# Patient Record
Sex: MALE | Race: WHITE | Employment: UNEMPLOYED | URBAN - METROPOLITAN AREA
[De-identification: names, ages, dates, MRNs, and addresses within clinical notes are randomized per-mention and may not be internally consistent; named-entity substitution may affect disease eponyms.]

---

## 2022-03-09 ENCOUNTER — APPOINTMENT (OUTPATIENT)
Dept: GENERAL RADIOLOGY | Age: 72
DRG: 389 | End: 2022-03-09
Payer: MEDICARE

## 2022-03-09 ENCOUNTER — HOSPITAL ENCOUNTER (INPATIENT)
Age: 72
LOS: 6 days | Discharge: NURSING, REHAB OR LONG-TERM CARE | DRG: 389 | End: 2022-03-15
Attending: EMERGENCY MEDICINE | Admitting: INTERNAL MEDICINE
Payer: MEDICARE

## 2022-03-09 ENCOUNTER — APPOINTMENT (OUTPATIENT)
Dept: CT IMAGING | Age: 72
DRG: 389 | End: 2022-03-09
Payer: MEDICARE

## 2022-03-09 DIAGNOSIS — K56.609 SBO (SMALL BOWEL OBSTRUCTION) (HCC): ICD-10-CM

## 2022-03-09 DIAGNOSIS — E87.6 HYPOKALEMIA: ICD-10-CM

## 2022-03-09 DIAGNOSIS — I48.91 ATRIAL FIBRILLATION WITH RVR (HCC): Primary | ICD-10-CM

## 2022-03-09 DIAGNOSIS — R19.7 NAUSEA VOMITING AND DIARRHEA: ICD-10-CM

## 2022-03-09 DIAGNOSIS — R11.2 NAUSEA VOMITING AND DIARRHEA: ICD-10-CM

## 2022-03-09 LAB
ALBUMIN SERPL-MCNC: 3.2 G/DL (ref 3.5–5.2)
ALP BLD-CCNC: 42 U/L (ref 40–129)
ALT SERPL-CCNC: 6 U/L (ref 0–40)
ANION GAP SERPL CALCULATED.3IONS-SCNC: 14 MMOL/L (ref 7–16)
APTT: 26.8 SEC (ref 24.5–35.1)
AST SERPL-CCNC: 10 U/L (ref 0–39)
BACTERIA: ABNORMAL /HPF
BASOPHILS ABSOLUTE: 0.02 E9/L (ref 0–0.2)
BASOPHILS ABSOLUTE: 0.03 E9/L (ref 0–0.2)
BASOPHILS RELATIVE PERCENT: 0.3 % (ref 0–2)
BASOPHILS RELATIVE PERCENT: 0.4 % (ref 0–2)
BILIRUB SERPL-MCNC: 0.3 MG/DL (ref 0–1.2)
BILIRUBIN URINE: NEGATIVE
BLOOD, URINE: ABNORMAL
BUN BLDV-MCNC: 22 MG/DL (ref 6–23)
CALCIUM SERPL-MCNC: 8.8 MG/DL (ref 8.6–10.2)
CHLORIDE BLD-SCNC: 107 MMOL/L (ref 98–107)
CLARITY: CLEAR
CO2: 16 MMOL/L (ref 22–29)
COLOR: YELLOW
CREAT SERPL-MCNC: 0.9 MG/DL (ref 0.7–1.2)
EKG ATRIAL RATE: 150 BPM
EKG Q-T INTERVAL: 300 MS
EKG QRS DURATION: 66 MS
EKG QTC CALCULATION (BAZETT): 485 MS
EKG R AXIS: 74 DEGREES
EKG T AXIS: -122 DEGREES
EKG VENTRICULAR RATE: 157 BPM
EOSINOPHILS ABSOLUTE: 0.16 E9/L (ref 0.05–0.5)
EOSINOPHILS ABSOLUTE: 0.18 E9/L (ref 0.05–0.5)
EOSINOPHILS RELATIVE PERCENT: 2 % (ref 0–6)
EOSINOPHILS RELATIVE PERCENT: 2.5 % (ref 0–6)
GFR AFRICAN AMERICAN: >60
GFR NON-AFRICAN AMERICAN: >60 ML/MIN/1.73
GLUCOSE BLD-MCNC: 141 MG/DL (ref 74–99)
GLUCOSE URINE: NEGATIVE MG/DL
HCT VFR BLD CALC: 34 % (ref 37–54)
HCT VFR BLD CALC: 36 % (ref 37–54)
HEMOGLOBIN: 11.6 G/DL (ref 12.5–16.5)
HEMOGLOBIN: 12.1 G/DL (ref 12.5–16.5)
IMMATURE GRANULOCYTES #: 0.03 E9/L
IMMATURE GRANULOCYTES #: 0.04 E9/L
IMMATURE GRANULOCYTES %: 0.4 % (ref 0–5)
IMMATURE GRANULOCYTES %: 0.5 % (ref 0–5)
KETONES, URINE: NEGATIVE MG/DL
LACTIC ACID: 1.7 MMOL/L (ref 0.5–2.2)
LEUKOCYTE ESTERASE, URINE: NEGATIVE
LIPASE: 228 U/L (ref 13–60)
LYMPHOCYTES ABSOLUTE: 1.51 E9/L (ref 1.5–4)
LYMPHOCYTES ABSOLUTE: 1.61 E9/L (ref 1.5–4)
LYMPHOCYTES RELATIVE PERCENT: 20.6 % (ref 20–42)
LYMPHOCYTES RELATIVE PERCENT: 20.9 % (ref 20–42)
MAGNESIUM: 2 MG/DL (ref 1.6–2.6)
MCH RBC QN AUTO: 33.6 PG (ref 26–35)
MCH RBC QN AUTO: 34.1 PG (ref 26–35)
MCHC RBC AUTO-ENTMCNC: 33.6 % (ref 32–34.5)
MCHC RBC AUTO-ENTMCNC: 34.1 % (ref 32–34.5)
MCV RBC AUTO: 100 FL (ref 80–99.9)
MCV RBC AUTO: 100 FL (ref 80–99.9)
MONOCYTES ABSOLUTE: 0.4 E9/L (ref 0.1–0.95)
MONOCYTES ABSOLUTE: 0.45 E9/L (ref 0.1–0.95)
MONOCYTES RELATIVE PERCENT: 5.5 % (ref 2–12)
MONOCYTES RELATIVE PERCENT: 5.7 % (ref 2–12)
NEUTROPHILS ABSOLUTE: 5.08 E9/L (ref 1.8–7.3)
NEUTROPHILS ABSOLUTE: 5.54 E9/L (ref 1.8–7.3)
NEUTROPHILS RELATIVE PERCENT: 70.4 % (ref 43–80)
NEUTROPHILS RELATIVE PERCENT: 70.8 % (ref 43–80)
NITRITE, URINE: POSITIVE
PDW BLD-RTO: 14.3 FL (ref 11.5–15)
PDW BLD-RTO: 14.4 FL (ref 11.5–15)
PH UA: 5.5 (ref 5–9)
PLATELET # BLD: 460 E9/L (ref 130–450)
PLATELET # BLD: 476 E9/L (ref 130–450)
PMV BLD AUTO: 9.1 FL (ref 7–12)
PMV BLD AUTO: 9.8 FL (ref 7–12)
POTASSIUM REFLEX MAGNESIUM: 3.3 MMOL/L (ref 3.5–5)
PROTEIN UA: NEGATIVE MG/DL
RBC # BLD: 3.4 E12/L (ref 3.8–5.8)
RBC # BLD: 3.6 E12/L (ref 3.8–5.8)
RBC UA: ABNORMAL /HPF (ref 0–2)
REASON FOR REJECTION: NORMAL
REASON FOR REJECTION: NORMAL
REJECTED TEST: NORMAL
REJECTED TEST: NORMAL
SARS-COV-2, NAAT: NOT DETECTED
SODIUM BLD-SCNC: 137 MMOL/L (ref 132–146)
SPECIFIC GRAVITY UA: <=1.005 (ref 1–1.03)
T4 TOTAL: 6.7 MCG/DL (ref 4.5–11.7)
TOTAL PROTEIN: 6.7 G/DL (ref 6.4–8.3)
TROPONIN, HIGH SENSITIVITY: 14 NG/L (ref 0–11)
TROPONIN, HIGH SENSITIVITY: 14 NG/L (ref 0–11)
TSH SERPL DL<=0.05 MIU/L-ACNC: 9.9 UIU/ML (ref 0.27–4.2)
UROBILINOGEN, URINE: 0.2 E.U./DL
WBC # BLD: 7.2 E9/L (ref 4.5–11.5)
WBC # BLD: 7.8 E9/L (ref 4.5–11.5)
WBC UA: ABNORMAL /HPF (ref 0–5)

## 2022-03-09 PROCEDURE — 93005 ELECTROCARDIOGRAM TRACING: CPT | Performed by: EMERGENCY MEDICINE

## 2022-03-09 PROCEDURE — 2500000003 HC RX 250 WO HCPCS: Performed by: EMERGENCY MEDICINE

## 2022-03-09 PROCEDURE — 36415 COLL VENOUS BLD VENIPUNCTURE: CPT

## 2022-03-09 PROCEDURE — 85025 COMPLETE CBC W/AUTO DIFF WBC: CPT

## 2022-03-09 PROCEDURE — 81001 URINALYSIS AUTO W/SCOPE: CPT

## 2022-03-09 PROCEDURE — 2580000003 HC RX 258: Performed by: RADIOLOGY

## 2022-03-09 PROCEDURE — 83605 ASSAY OF LACTIC ACID: CPT

## 2022-03-09 PROCEDURE — 85730 THROMBOPLASTIN TIME PARTIAL: CPT

## 2022-03-09 PROCEDURE — 6360000002 HC RX W HCPCS: Performed by: STUDENT IN AN ORGANIZED HEALTH CARE EDUCATION/TRAINING PROGRAM

## 2022-03-09 PROCEDURE — 96368 THER/DIAG CONCURRENT INF: CPT

## 2022-03-09 PROCEDURE — 6370000000 HC RX 637 (ALT 250 FOR IP): Performed by: STUDENT IN AN ORGANIZED HEALTH CARE EDUCATION/TRAINING PROGRAM

## 2022-03-09 PROCEDURE — 96366 THER/PROPH/DIAG IV INF ADDON: CPT

## 2022-03-09 PROCEDURE — 84484 ASSAY OF TROPONIN QUANT: CPT

## 2022-03-09 PROCEDURE — 2060000000 HC ICU INTERMEDIATE R&B

## 2022-03-09 PROCEDURE — 83690 ASSAY OF LIPASE: CPT

## 2022-03-09 PROCEDURE — 71045 X-RAY EXAM CHEST 1 VIEW: CPT

## 2022-03-09 PROCEDURE — 74177 CT ABD & PELVIS W/CONTRAST: CPT

## 2022-03-09 PROCEDURE — 87635 SARS-COV-2 COVID-19 AMP PRB: CPT

## 2022-03-09 PROCEDURE — 96375 TX/PRO/DX INJ NEW DRUG ADDON: CPT

## 2022-03-09 PROCEDURE — 6360000004 HC RX CONTRAST MEDICATION: Performed by: RADIOLOGY

## 2022-03-09 PROCEDURE — 96376 TX/PRO/DX INJ SAME DRUG ADON: CPT

## 2022-03-09 PROCEDURE — 80053 COMPREHEN METABOLIC PANEL: CPT

## 2022-03-09 PROCEDURE — 84436 ASSAY OF TOTAL THYROXINE: CPT

## 2022-03-09 PROCEDURE — 2580000003 HC RX 258: Performed by: STUDENT IN AN ORGANIZED HEALTH CARE EDUCATION/TRAINING PROGRAM

## 2022-03-09 PROCEDURE — 84443 ASSAY THYROID STIM HORMONE: CPT

## 2022-03-09 PROCEDURE — 99285 EMERGENCY DEPT VISIT HI MDM: CPT

## 2022-03-09 PROCEDURE — 2580000003 HC RX 258: Performed by: EMERGENCY MEDICINE

## 2022-03-09 PROCEDURE — 96374 THER/PROPH/DIAG INJ IV PUSH: CPT

## 2022-03-09 PROCEDURE — 96365 THER/PROPH/DIAG IV INF INIT: CPT

## 2022-03-09 PROCEDURE — 83735 ASSAY OF MAGNESIUM: CPT

## 2022-03-09 RX ORDER — FENTANYL CITRATE 50 UG/ML
25 INJECTION, SOLUTION INTRAMUSCULAR; INTRAVENOUS ONCE
Status: COMPLETED | OUTPATIENT
Start: 2022-03-09 | End: 2022-03-09

## 2022-03-09 RX ORDER — 0.9 % SODIUM CHLORIDE 0.9 %
1000 INTRAVENOUS SOLUTION INTRAVENOUS ONCE
Status: COMPLETED | OUTPATIENT
Start: 2022-03-09 | End: 2022-03-09

## 2022-03-09 RX ORDER — POTASSIUM CHLORIDE 20 MEQ/1
40 TABLET, EXTENDED RELEASE ORAL ONCE
Status: DISCONTINUED | OUTPATIENT
Start: 2022-03-09 | End: 2022-03-09

## 2022-03-09 RX ORDER — HEPARIN SODIUM 10000 [USP'U]/100ML
5-30 INJECTION, SOLUTION INTRAVENOUS CONTINUOUS
Status: DISCONTINUED | OUTPATIENT
Start: 2022-03-09 | End: 2022-03-12

## 2022-03-09 RX ORDER — LORATADINE 10 MG/1
10 CAPSULE, LIQUID FILLED ORAL DAILY
Status: ON HOLD | COMMUNITY
End: 2022-03-14

## 2022-03-09 RX ORDER — OXYMETAZOLINE HYDROCHLORIDE 0.05 G/100ML
2 SPRAY NASAL ONCE
Status: COMPLETED | OUTPATIENT
Start: 2022-03-09 | End: 2022-03-09

## 2022-03-09 RX ORDER — MEGESTROL ACETATE 40 MG/ML
800 SUSPENSION ORAL DAILY
COMMUNITY

## 2022-03-09 RX ORDER — PANTOPRAZOLE SODIUM 40 MG/1
40 TABLET, DELAYED RELEASE ORAL DAILY
COMMUNITY

## 2022-03-09 RX ORDER — ASPIRIN 81 MG/1
81 TABLET, CHEWABLE ORAL DAILY
Status: ON HOLD | COMMUNITY
End: 2022-03-14

## 2022-03-09 RX ORDER — HEPARIN SODIUM 1000 [USP'U]/ML
30 INJECTION, SOLUTION INTRAVENOUS; SUBCUTANEOUS PRN
Status: DISCONTINUED | OUTPATIENT
Start: 2022-03-09 | End: 2022-03-12

## 2022-03-09 RX ORDER — SODIUM CHLORIDE 0.9 % (FLUSH) 0.9 %
10 SYRINGE (ML) INJECTION PRN
Status: COMPLETED | OUTPATIENT
Start: 2022-03-09 | End: 2022-03-10

## 2022-03-09 RX ORDER — PROPRANOLOL HYDROCHLORIDE 120 MG/1
120 CAPSULE, EXTENDED RELEASE ORAL DAILY
Status: ON HOLD | COMMUNITY
End: 2022-03-15 | Stop reason: HOSPADM

## 2022-03-09 RX ORDER — HEPARIN SODIUM 1000 [USP'U]/ML
60 INJECTION, SOLUTION INTRAVENOUS; SUBCUTANEOUS PRN
Status: DISCONTINUED | OUTPATIENT
Start: 2022-03-09 | End: 2022-03-12

## 2022-03-09 RX ORDER — PRIMIDONE 50 MG/1
50 TABLET ORAL EVERY MORNING
COMMUNITY

## 2022-03-09 RX ORDER — DOCUSATE SODIUM 100 MG/1
100 CAPSULE, LIQUID FILLED ORAL DAILY
COMMUNITY

## 2022-03-09 RX ORDER — POTASSIUM CHLORIDE 20 MEQ/1
20 TABLET, EXTENDED RELEASE ORAL DAILY
Status: ON HOLD | COMMUNITY
End: 2022-03-14

## 2022-03-09 RX ORDER — HEPARIN SODIUM 1000 [USP'U]/ML
60 INJECTION, SOLUTION INTRAVENOUS; SUBCUTANEOUS ONCE
Status: COMPLETED | OUTPATIENT
Start: 2022-03-09 | End: 2022-03-09

## 2022-03-09 RX ORDER — POTASSIUM CHLORIDE 7.45 MG/ML
10 INJECTION INTRAVENOUS
Status: COMPLETED | OUTPATIENT
Start: 2022-03-09 | End: 2022-03-09

## 2022-03-09 RX ORDER — DILTIAZEM HYDROCHLORIDE 5 MG/ML
10 INJECTION INTRAVENOUS ONCE
Status: COMPLETED | OUTPATIENT
Start: 2022-03-09 | End: 2022-03-09

## 2022-03-09 RX ORDER — DILTIAZEM HYDROCHLORIDE 100 MG/1
INJECTION, POWDER, LYOPHILIZED, FOR SOLUTION INTRAVENOUS
Status: DISPENSED
Start: 2022-03-09 | End: 2022-03-10

## 2022-03-09 RX ORDER — TOPIRAMATE 25 MG/1
25 TABLET ORAL 2 TIMES DAILY
COMMUNITY

## 2022-03-09 RX ORDER — LIDOCAINE HYDROCHLORIDE 20 MG/ML
JELLY TOPICAL ONCE
Status: COMPLETED | OUTPATIENT
Start: 2022-03-09 | End: 2022-03-09

## 2022-03-09 RX ORDER — SODIUM CHLORIDE 9 MG/ML
INJECTION, SOLUTION INTRAVENOUS CONTINUOUS
Status: ACTIVE | OUTPATIENT
Start: 2022-03-09 | End: 2022-03-10

## 2022-03-09 RX ORDER — 0.9 % SODIUM CHLORIDE 0.9 %
250 INTRAVENOUS SOLUTION INTRAVENOUS ONCE
Status: DISCONTINUED | OUTPATIENT
Start: 2022-03-09 | End: 2022-03-15

## 2022-03-09 RX ADMIN — DILTIAZEM HYDROCHLORIDE 10 MG: 5 INJECTION INTRAVENOUS at 13:00

## 2022-03-09 RX ADMIN — POTASSIUM CHLORIDE 10 MEQ: 10 INJECTION, SOLUTION INTRAVENOUS at 17:59

## 2022-03-09 RX ADMIN — SODIUM CHLORIDE, PRESERVATIVE FREE 10 ML: 5 INJECTION INTRAVENOUS at 14:24

## 2022-03-09 RX ADMIN — Medication 2 SPRAY: at 16:19

## 2022-03-09 RX ADMIN — HEPARIN SODIUM 12 UNITS/KG/HR: 10000 INJECTION, SOLUTION INTRAVENOUS at 18:00

## 2022-03-09 RX ADMIN — FENTANYL CITRATE 25 MCG: 50 INJECTION, SOLUTION INTRAMUSCULAR; INTRAVENOUS at 15:18

## 2022-03-09 RX ADMIN — FENTANYL CITRATE 25 MCG: 50 INJECTION INTRAMUSCULAR; INTRAVENOUS at 18:35

## 2022-03-09 RX ADMIN — SODIUM CHLORIDE 1000 ML: 9 INJECTION, SOLUTION INTRAVENOUS at 12:59

## 2022-03-09 RX ADMIN — LIDOCAINE HYDROCHLORIDE: 20 JELLY TOPICAL at 16:19

## 2022-03-09 RX ADMIN — HEPARIN SODIUM 2720 UNITS: 1000 INJECTION INTRAVENOUS; SUBCUTANEOUS at 18:01

## 2022-03-09 RX ADMIN — SODIUM CHLORIDE: 9 INJECTION, SOLUTION INTRAVENOUS at 18:13

## 2022-03-09 RX ADMIN — IOPAMIDOL 75 ML: 755 INJECTION, SOLUTION INTRAVENOUS at 14:27

## 2022-03-09 RX ADMIN — POTASSIUM CHLORIDE 10 MEQ: 10 INJECTION, SOLUTION INTRAVENOUS at 19:12

## 2022-03-09 RX ADMIN — POTASSIUM CHLORIDE 10 MEQ: 10 INJECTION, SOLUTION INTRAVENOUS at 20:13

## 2022-03-09 RX ADMIN — POTASSIUM CHLORIDE 10 MEQ: 10 INJECTION, SOLUTION INTRAVENOUS at 21:37

## 2022-03-09 RX ADMIN — DEXTROSE MONOHYDRATE 2.5 MG/HR: 50 INJECTION, SOLUTION INTRAVENOUS at 13:08

## 2022-03-09 ASSESSMENT — PAIN DESCRIPTION - LOCATION
LOCATION: THROAT
LOCATION: THROAT

## 2022-03-09 ASSESSMENT — PAIN SCALES - GENERAL
PAINLEVEL_OUTOF10: 2
PAINLEVEL_OUTOF10: 9
PAINLEVEL_OUTOF10: 2

## 2022-03-09 ASSESSMENT — PAIN DESCRIPTION - PAIN TYPE
TYPE: ACUTE PAIN
TYPE: ACUTE PAIN

## 2022-03-09 NOTE — ED NOTES
Meds list not complete. Discrepancies found in meds list sent from [facility].       Jose C Reese RN  03/09/22 0603

## 2022-03-09 NOTE — LETTER
[place] [employer] Medicaid  CERTIFICATION OF NECESSITY  FOR NON-EMERGENCY TRANSPORTATION   BY GROUND AMBULANCE      Individual Information   1. Name: Chiki Calvo 2. [place] Medicaid Billing Number:    3. Address: [address] [place] [address]      Transportation Provider Information   4. Provider Name:    5. [place] Provider Number:  National Provider Identifier (NPI):      Certification  7. Criteria:  During transport, this individual requires:  [x] Medical treatment or continuous     supervision by an EMT. [] The administration or regulation of oxygen by another person. [] Supervised protective restraint. 8. Period Beginning Date:  3/    /2022   9. Length  [x] Not more than 10 day(s)  [x] One Year     Additional Information Relevant to Certification   10. Comments or Explanations, If Necessary or Appropriate     Poor appetite and fluid intake, A-Fib RVR; h/o cognitive impairment (non-specific), pt is A&Ox1 person only     Certifying Practitioner Information   11. Name of Practitioner: Latricia Chavez MD   12. [place] Medicaid Provider Number, If Applicable:  [address] Provider Identifier (NPI):      Signature Information   14. Date of Signature: 3/11/2022 15. Name of Person Signing: Electronically signed by Loi Pierce RN on 3/11/2022 at 10:17 AM     16. Signature and Professional Designation: Electronically signed by Loi Pierce RN on 3/11/2022 at 10:17 AM  [email or web address]     ODM 90032  Rev. 7/2015    ODM 70797  Rev. 7/2015    [address] [facility] Encounter Date/Time: 3/9/2022 [address] Account: [de-identified]    MRN: [id number]    Patient: Chiki Calvo    Contact Serial #: [id number]      ENCOUNTER          Patient Class: I Private Enc?   No Unit [facility] [id number]-M   Hospital Service: MED   Encounter DX: Hypokalemia [E87.6]   ADM Provider: Latricia Chavez MD   Procedure:     ATT Provider: Latricia Chavez MD   REF Provider:

## 2022-03-09 NOTE — ED PROVIDER NOTES
HPI:  3/9/22, Time: 12:24 PM JESSY Mercado is a 73 y.o. male presenting to the ED for abdominal pain, nausea, vomiting, diarrhea, beginning a few days ago. The complaint has been persistent, moderate in severity, and worsened by nothing. Patient lives in an assisted living facility. He went to visit his niece over the weekend and he had vomiting and diarrhea during his visit. He has persisted to have vomiting and diarrhea over the last couple of days. He is not eating or drinking very much per the nurses at the assisted living facility. Patient is complaining of lower abdominal pain. Aching sensation. No radiation. No alleviating or exacerbating factors. He denies any fever, rhinorrhea, sore throat, cough, chest pain, shortness of breath, dysuria. ROS:   Pertinent positives and negatives are stated within HPI, all other systems reviewed and are negative.  --------------------------------------------- PAST HISTORY ---------------------------------------------  Past Medical History:  has a past medical history of A-fib ([facility] [address] Cognitive impairment, Cystoma, [facility] Hyperlipidemia, and Hypothyroid. Past Surgical History:  has no past surgical history on file. Social History:  reports that he has never smoked. He has never used smokeless tobacco. He reports previous alcohol use. He reports that he does not use drugs. Family History: family history is not on file. The patients home medications have been reviewed. Allergies: Patient has no known allergies.     ---------------------------------------------------PHYSICAL EXAM--------------------------------------    Constitutional/General: Alert and oriented x3, well appearing, non toxic in NAD  Head: Normocephalic and atraumatic  Eyes: PERRL, EOMI  Mouth: Oropharynx clear, handling secretions, no trismus  Neck: Supple, full ROM, non tender to palpation in the midline, no stridor, no crepitus, no meningeal E9/L    Basophils Absolute 0.02 0.00 - 0.20 E9/L   Comprehensive Metabolic Panel w/ Reflex to MG   Result Value Ref Range    Sodium 137 132 - 146 mmol/L    Potassium reflex Magnesium 3.3 (L) 3.5 - 5.0 mmol/L    Chloride 107 98 - 107 mmol/L    CO2 16 (L) 22 - 29 mmol/L    Anion Gap 14 7 - 16 mmol/L    Glucose 141 (H) 74 - 99 mg/dL    BUN 22 6 - 23 mg/dL    CREATININE 0.9 0.7 - 1.2 mg/dL    GFR Non-African American >60 >=60 mL/min/1.73    GFR African American >60     Calcium 8.8 8.6 - 10.2 mg/dL    Total Protein 6.7 6.4 - 8.3 g/dL    Albumin 3.2 (L) 3.5 - 5.2 g/dL    Total Bilirubin 0.3 0.0 - 1.2 mg/dL    Alkaline Phosphatase 42 40 - 129 U/L    ALT 6 0 - 40 U/L    AST 10 0 - 39 U/L   Troponin   Result Value Ref Range    Troponin, High Sensitivity 14 (H) 0 - 11 ng/L   Urinalysis with Microscopic   Result Value Ref Range    Color, UA Yellow Straw/Yellow    Clarity, UA Clear Clear    Glucose, Ur Negative Negative mg/dL    Bilirubin Urine Negative Negative    Ketones, Urine Negative Negative mg/dL    Specific Gravity, UA <=1.005 1.005 - 1.030    Blood, Urine MODERATE (A) Negative    pH, UA 5.5 5.0 - 9.0    Protein, UA Negative Negative mg/dL    Urobilinogen, Urine 0.2 <2.0 E.U./dL    Nitrite, Urine POSITIVE (A) Negative    Leukocyte Esterase, Urine Negative Negative    WBC, UA 0-1 0 - 5 /HPF    RBC, UA 2-5 0 - 2 /HPF    Bacteria, UA MODERATE (A) None Seen /HPF   Lactic Acid   Result Value Ref Range    Lactic Acid 1.7 0.5 - 2.2 mmol/L   Lipase   Result Value Ref Range    Lipase 228 (H) 13 - 60 U/L   Magnesium   Result Value Ref Range    Magnesium 2.0 1.6 - 2.6 mg/dL   Troponin   Result Value Ref Range    Troponin, High Sensitivity 14 (H) 0 - 11 ng/L   EKG 12 Lead   Result Value Ref Range    Ventricular Rate 157 BPM    Atrial Rate 150 BPM    QRS Duration 66 ms    Q-T Interval 300 ms    QTc Calculation (Bazett) 485 ms    R Axis 74 degrees    T Axis -122 degrees       RADIOLOGY:  Interpreted by Radiologist.  XR CHEST PORTABLE sodium chloride bolus (has no administration in time range)   potassium chloride 10 mEq/100 mL IVPB (Peripheral Line) (has no administration in time range)   heparin (porcine) injection 2,720 Units (has no administration in time range)   heparin (porcine) injection 2,720 Units (has no administration in time range)   heparin (porcine) injection 1,360 Units (has no administration in time range)   heparin 25,000 units in dextrose 5% 250 mL (premix) infusion (has no administration in time range)   0.9 % sodium chloride bolus (0 mLs IntraVENous Stopped 3/9/22 1526)   iopamidol (ISOVUE-370) 76 % injection 75 mL (75 mLs IntraVENous Given 3/9/22 1427)   sodium chloride flush 0.9 % injection 10 mL (10 mLs IntraVENous Given 3/9/22 1424)   fentaNYL (SUBLIMAZE) injection 25 mcg (25 mcg IntraVENous Given 3/9/22 1518)   oxymetazoline (AFRIN) 0.05 % nasal spray 2 spray (2 sprays Each Nostril Given by Other 3/9/22 1619)   lidocaine (XYLOCAINE) 2 % jelly ( Topical Given by Other 3/9/22 1619)             Medical Decision Making:    Patient's vitals remarkable for tachycardia. Heart rate in the 170s on arrival.  Patient's niece is at bedside. She states that he does have a history of atrial fibrillation. She is not sure if he is on any medications at this time. I spoke with the . Patient is not on any anticoagulants or any medications for atrial fibrillation. Patient started on cardizem drip. Rectal exam done. Heme occult negative stool. Labs obtained. Hemoglobin stable at 12.1. Potassium of 3.3. Troponin of 14. Lactic acid normal. Lipase elevated. CT abd/pelvis pending. Troponin 14, repeat 14, EKG shows A. fib RVR. Covid testing negative. White count 7.2, hemoglobin stable at 12.1. Mild hypokalemia with potassium 3.3 which was repleted. Lipase mildly elevated to 28. CT of the abdomen was obtained as he does endorse generalized abdominal pain, nausea and vomiting.   CT imaging shows a hiatal hernia which family is aware of, T10 and T11 compression fractures which appear to be chronic, he has no pain in this area on exam.  CT imaging also notes a small bowel obstruction. Patient was given pain medication. He was made NPO. Patient will be transferred to [facility] for evaluation of small bowel obstruction with associated atrial fibrillation with RVR. General surgery consulted who requested NG tube to be placed. Cardiology was requested and recommends small IV fluid bolus and does request low-dose heparin protocol as patient is not chronically anticoagulated, has elevated [id number] score. Surgery is okay with heparin. Patient has no contraindications for anticoagulation. Re-Evaluations:             Re-evaluation. Patients symptoms show no change      Consultations:    Spoke with Dr. Gurwinder Pond who will admit     Dr. Nancie Rich, surgery who will provide consult.     Dr. Alfredito Wilburn, cardiology who will provide consult.     This patient has remained hemodynamically stable during their ED course. Critical Care: 35          This patient's ED course included: a personal history and physicial examination, re-evaluation prior to disposition, multiple bedside re-evaluations, IV medications, cardiac monitoring, continuous pulse oximetry, complex medical decision making and emergency management and a personal history and physicial eaxmination    This patient has remained hemodynamically stable during their ED course. Counseling: The emergency provider has spoken with the patient and family member patient and guardian and discussed todays results, in addition to providing specific details for the plan of care and counseling regarding the diagnosis and prognosis. Questions are answered at this time and they are agreeable with the plan.       --------------------------------- IMPRESSION AND DISPOSITION ---------------------------------    IMPRESSION  1.  Atrial fibrillation with RVR ([facility] [address]    2. Nausea vomiting and diarrhea    3. SBO (small bowel obstruction) ([facility] [address]    4.  Hypokalemia        DISPOSITION  Disposition: Admit to telemetry at [facility]  Patient condition is good         Amilcar Rojas DO  Resident  03/09/22 [address] MD  03/10/22 6313

## 2022-03-09 NOTE — ED NOTES
Dr request to speak to general surgeon again. Call placed and [facility] calling the surgeon now.      Deborah Maya RN  03/09/22 2976

## 2022-03-09 NOTE — PROGRESS NOTES
Chief Complaint   Patient presents with    Tachycardia     PT was tachy rate of 160's from SNF, sent d/t poor appetite and fluid intake. 3/9/22  3:01 PM EST      Patient signed out to me by ongoing physician pending CT imaging. Current disposition is for transfer to [facility] for A. fib RVR. Patient presents from assisted living facility for nausea vomiting generalized abdominal pain. Noted be tachycardic on arrival, EKG shows atrial fibrillation with RVR, heart rate in the 160s. Patient given Cardizem bolus and placed on drip. Family states he did have a history of A. fib 10 years ago, has not been on any medication since and has not had any complications since that time. Is not on any blood thinning medications. Daughter states that he was at her home over the weekend, started to develop nausea vomiting and loose stools as of Sunday with associated generalized abdominal pain. Has a history of previous cholecystectomy. Troponin 14, repeat 14, EKG shows A. fib RVR. Covid testing negative. White count 7.2, hemoglobin stable at 12.1. Mild hypokalemia with potassium 3.3 which was repleted. Lipase mildly elevated to 28. CT of the abdomen was obtained as he does endorse generalized abdominal pain, nausea and vomiting. CT imaging shows a hiatal hernia which family is aware of, T10 and T11 compression fractures which appear to be chronic, he has no pain in this area on exam.  CT imaging also notes a small bowel obstruction. Patient was given pain medication. He was made NPO. Patient will be transferred to [facility] for evaluation of small bowel obstruction with associated atrial fibrillation with RVR. General surgery consulted who requested NG tube to be placed. Cardiology was requested and recommends small IV fluid bolus and does request low-dose heparin protocol as patient is not chronically anticoagulated, has elevated [id number] score.   Surgery is okay with heparin. Patient has no contraindications for anticoagulation. ED Course as of 03/09/22 1652   Wed Mar 09, 2022   1531 Spoke with general surgery, Dr. Tobin Hdez who will provide consult for small bowel obstruction. He does request replace NG tube in the department []   1600 Dr. Rell Lou will accept as transfer to [facility] []   2804 Spoke with Dr Herb Ray, cardiology and discussed case. He will provide consult []      ED Course User Index  [] Nalini Echols, DO       --------------------------------------------- PAST HISTORY ---------------------------------------------  Past Medical History:  has a past medical history of A-fib ([facility] [address] Cognitive impairment, Cystoma, Heart failure ([facility] [address] Hyperlipidemia, and Hypothyroid. Past Surgical History:  has no past surgical history on file. Social History:  reports that he has never smoked. He has never used smokeless tobacco. He reports previous alcohol use. He reports that he does not use drugs. Family History: family history is not on file. The patients home medications have been reviewed. Allergies: Patient has no known allergies.     -------------------------------------------------- RESULTS -------------------------------------------------    LABS:  Results for orders placed or performed during the hospital encounter of 03/09/22   COVID-19, Rapid    Specimen: Nasopharyngeal Swab   Result Value Ref Range    SARS-CoV-2, NAAT Not Detected Not Detected   CBC with Auto Differential   Result Value Ref Range    WBC 7.2 4.5 - 11.5 E9/L    RBC 3.60 (L) 3.80 - 5.80 E12/L    Hemoglobin 12.1 (L) 12.5 - 16.5 g/dL    Hematocrit 36.0 (L) 37.0 - 54.0 %    .0 (H) 80.0 - 99.9 fL    MCH 33.6 26.0 - 35.0 pg    MCHC 33.6 32.0 - 34.5 %    RDW 14.3 11.5 - 15.0 fL    Platelets 328 (H) 710 - 450 E9/L    MPV 9.1 7.0 - 12.0 fL    Neutrophils % 70.4 43.0 - 80.0 %    Immature Granulocytes % 0.4 0.0 - 5.0 %    Lymphocytes % 20.9 20.0 - 42.0 %    Monocytes % 5.5 2.0 - 12.0 %    Eosinophils % 2.5 0.0 - 6.0 %    Basophils % 0.3 0.0 - 2.0 %    Neutrophils Absolute 5.08 1.80 - 7.30 E9/L    Immature Granulocytes # 0.03 E9/L    Lymphocytes Absolute 1.51 1.50 - 4.00 E9/L    Monocytes Absolute 0.40 0.10 - 0.95 E9/L    Eosinophils Absolute 0.18 0.05 - 0.50 E9/L    Basophils Absolute 0.02 0.00 - 0.20 E9/L   Comprehensive Metabolic Panel w/ Reflex to MG   Result Value Ref Range    Sodium 137 132 - 146 mmol/L    Potassium reflex Magnesium 3.3 (L) 3.5 - 5.0 mmol/L    Chloride 107 98 - 107 mmol/L    CO2 16 (L) 22 - 29 mmol/L    Anion Gap 14 7 - 16 mmol/L    Glucose 141 (H) 74 - 99 mg/dL    BUN 22 6 - 23 mg/dL    CREATININE 0.9 0.7 - 1.2 mg/dL    GFR Non-African American >60 >=60 mL/min/1.73    GFR African American >60     Calcium 8.8 8.6 - 10.2 mg/dL    Total Protein 6.7 6.4 - 8.3 g/dL    Albumin 3.2 (L) 3.5 - 5.2 g/dL    Total Bilirubin 0.3 0.0 - 1.2 mg/dL    Alkaline Phosphatase 42 40 - 129 U/L    ALT 6 0 - 40 U/L    AST 10 0 - 39 U/L   Troponin   Result Value Ref Range    Troponin, High Sensitivity 14 (H) 0 - 11 ng/L   Urinalysis with Microscopic   Result Value Ref Range    Color, UA Yellow Straw/Yellow    Clarity, UA Clear Clear    Glucose, Ur Negative Negative mg/dL    Bilirubin Urine Negative Negative    Ketones, Urine Negative Negative mg/dL    Specific Gravity, UA <=1.005 1.005 - 1.030    Blood, Urine MODERATE (A) Negative    pH, UA 5.5 5.0 - 9.0    Protein, UA Negative Negative mg/dL    Urobilinogen, Urine 0.2 <2.0 E.U./dL    Nitrite, Urine POSITIVE (A) Negative    Leukocyte Esterase, Urine Negative Negative    WBC, UA 0-1 0 - 5 /HPF    RBC, UA 2-5 0 - 2 /HPF    Bacteria, UA MODERATE (A) None Seen /HPF   Lactic Acid   Result Value Ref Range    Lactic Acid 1.7 0.5 - 2.2 mmol/L   Lipase   Result Value Ref Range    Lipase 228 (H) 13 - 60 U/L   Magnesium   Result Value Ref Range    Magnesium 2.0 1.6 - 2.6 mg/dL   Troponin   Result Value Ref Range    Troponin, High Sensitivity 14 (H) 0 - 11 ng/L   EKG 12 Lead   Result Value Ref Range    Ventricular Rate 157 BPM    Atrial Rate 150 BPM    QRS Duration 66 ms    Q-T Interval 300 ms    QTc Calculation (Bazett) 485 ms    R Axis 74 degrees    T Axis -122 degrees       RADIOLOGY:  XR CHEST PORTABLE   Final Result   1. Satisfactory position of the NG tube within the stomach         CT ABDOMEN PELVIS W IV CONTRAST Additional Contrast? None   Final Result   Small bowel obstruction although the exact etiology and point of obstruction   is not clearly delineated on this examination. T10 and T11 compression fractures which are probably chronic. Mild diffuse urinary bladder wall thickening may be due to chronic outlet   obstruction. Small left inguinal fat and fluid containing hernia. Large hiatal hernia. Diverticulosis but no evidence of diverticulitis. XR CHEST PORTABLE   Final Result   No radiographic evidence of acute cardiopulmonary disease. Small hiatal hernia.                 ------------------------- NURSING NOTES AND VITALS REVIEWED ---------------------------  Date / Time Roomed:  3/9/2022 12:10 PM  ED Bed Assignment:  04/04    The nursing notes within the ED encounter and vital signs as below have been reviewed.      Patient Vitals for the past 24 hrs:   BP Temp Pulse Resp SpO2 Weight   03/09/22 1640 115/67 -- 93 18 99 % --   03/09/22 1439 117/61 -- 124 19 99 % --   03/09/22 1332 -- -- -- -- -- 100 lb (45.4 kg)   03/09/22 1328 119/70 -- 120 18 97 % --   03/09/22 1308 102/63 -- 121 -- -- --   03/09/22 1303 92/70 -- 104 -- -- --   03/09/22 1230 112/72 -- 160 -- -- --   03/09/22 1227 103/81 98.5 °F (36.9 °C) 86 16 100 % --       Oxygen Saturation Interpretation: Normal    ------------------------------------------ PROGRESS NOTES ------------------------------------------    Counseling:  I have spoken with the patient and discussed todays results, in addition to providing specific details for the plan of care and counseling regarding the diagnosis and prognosis. Their questions are answered at this time and they are agreeable with the plan of admission.    --------------------------------- ADDITIONAL PROVIDER NOTES ---------------------------------  Consultations:    Spoke with Dr. Nancy Marroquin who will admit    Dr. Mirtha Patel, surgery who will provide consult. Dr. Bailee Miramontes, cardiology who will provide consult. This patient has remained hemodynamically stable during their ED course. Medications   dilTIAZem injection 10 mg (10 mg IntraVENous Given 3/9/22 1300)     Followed by   dilTIAZem 100 mg in dextrose 5 % 100 mL infusion (ADD-Orwell) (10 mg/hr IntraVENous Rate/Dose Change 3/9/22 1509)   dilTIAZem 100 MG injection (  Not Given 3/9/22 1311)   0.9 % sodium chloride infusion (has no administration in time range)   0.9 % sodium chloride bolus (has no administration in time range)   potassium chloride 10 mEq/100 mL IVPB (Peripheral Line) (has no administration in time range)   heparin (porcine) injection 2,720 Units (has no administration in time range)   heparin (porcine) injection 2,720 Units (has no administration in time range)   heparin (porcine) injection 1,360 Units (has no administration in time range)   heparin 25,000 units in dextrose 5% 250 mL (premix) infusion (has no administration in time range)   0.9 % sodium chloride bolus (0 mLs IntraVENous Stopped 3/9/22 1526)   iopamidol (ISOVUE-370) 76 % injection 75 mL (75 mLs IntraVENous Given 3/9/22 1427)   sodium chloride flush 0.9 % injection 10 mL (10 mLs IntraVENous Given 3/9/22 1424)   fentaNYL (SUBLIMAZE) injection 25 mcg (25 mcg IntraVENous Given 3/9/22 1518)   oxymetazoline (AFRIN) 0.05 % nasal spray 2 spray (2 sprays Each Nostril Given by Other 3/9/22 1619)   lidocaine (XYLOCAINE) 2 % jelly ( Topical Given by Other 3/9/22 1619)         Diagnosis:  1. Atrial fibrillation with RVR ([address]    2. Nausea vomiting and diarrhea    3.  SBO (small bowel obstruction) [facility])        Disposition:  Patient's disposition: Transfer to [facility]   Patient's condition is stable.

## 2022-03-10 ENCOUNTER — APPOINTMENT (OUTPATIENT)
Dept: GENERAL RADIOLOGY | Age: 72
DRG: 389 | End: 2022-03-10
Payer: MEDICARE

## 2022-03-10 ENCOUNTER — APPOINTMENT (OUTPATIENT)
Dept: CT IMAGING | Age: 72
DRG: 389 | End: 2022-03-10
Payer: MEDICARE

## 2022-03-10 PROBLEM — K56.609 SMALL BOWEL OBSTRUCTION (HCC): Status: ACTIVE | Noted: 2022-03-10

## 2022-03-10 LAB
APTT: 25.7 SEC (ref 24.5–35.1)
APTT: 26.2 SEC (ref 24.5–35.1)
APTT: 28.7 SEC (ref 24.5–35.1)
APTT: 37.8 SEC (ref 24.5–35.1)

## 2022-03-10 PROCEDURE — 97165 OT EVAL LOW COMPLEX 30 MIN: CPT

## 2022-03-10 PROCEDURE — 97530 THERAPEUTIC ACTIVITIES: CPT

## 2022-03-10 PROCEDURE — 71045 X-RAY EXAM CHEST 1 VIEW: CPT

## 2022-03-10 PROCEDURE — 2060000000 HC ICU INTERMEDIATE R&B

## 2022-03-10 PROCEDURE — 85730 THROMBOPLASTIN TIME PARTIAL: CPT

## 2022-03-10 PROCEDURE — 97535 SELF CARE MNGMENT TRAINING: CPT

## 2022-03-10 PROCEDURE — 6360000002 HC RX W HCPCS: Performed by: INTERNAL MEDICINE

## 2022-03-10 PROCEDURE — 2500000003 HC RX 250 WO HCPCS: Performed by: EMERGENCY MEDICINE

## 2022-03-10 PROCEDURE — 6360000002 HC RX W HCPCS: Performed by: STUDENT IN AN ORGANIZED HEALTH CARE EDUCATION/TRAINING PROGRAM

## 2022-03-10 PROCEDURE — 74018 RADEX ABDOMEN 1 VIEW: CPT

## 2022-03-10 PROCEDURE — 97161 PT EVAL LOW COMPLEX 20 MIN: CPT

## 2022-03-10 PROCEDURE — 36415 COLL VENOUS BLD VENIPUNCTURE: CPT

## 2022-03-10 PROCEDURE — 74176 CT ABD & PELVIS W/O CONTRAST: CPT

## 2022-03-10 PROCEDURE — 2580000003 HC RX 258

## 2022-03-10 PROCEDURE — 99222 1ST HOSP IP/OBS MODERATE 55: CPT | Performed by: INTERNAL MEDICINE

## 2022-03-10 PROCEDURE — 2580000003 HC RX 258: Performed by: STUDENT IN AN ORGANIZED HEALTH CARE EDUCATION/TRAINING PROGRAM

## 2022-03-10 PROCEDURE — 2500000003 HC RX 250 WO HCPCS: Performed by: STUDENT IN AN ORGANIZED HEALTH CARE EDUCATION/TRAINING PROGRAM

## 2022-03-10 PROCEDURE — 6370000000 HC RX 637 (ALT 250 FOR IP): Performed by: INTERNAL MEDICINE

## 2022-03-10 RX ORDER — LORAZEPAM 2 MG/ML
0.5 INJECTION INTRAMUSCULAR EVERY 8 HOURS PRN
Status: DISCONTINUED | OUTPATIENT
Start: 2022-03-10 | End: 2022-03-15

## 2022-03-10 RX ORDER — OXYMETAZOLINE HYDROCHLORIDE 0.05 G/100ML
2 SPRAY NASAL ONCE
Status: COMPLETED | OUTPATIENT
Start: 2022-03-10 | End: 2022-03-10

## 2022-03-10 RX ORDER — SODIUM CHLORIDE 0.9 % (FLUSH) 0.9 %
SYRINGE (ML) INJECTION
Status: COMPLETED
Start: 2022-03-10 | End: 2022-03-10

## 2022-03-10 RX ORDER — LIDOCAINE HYDROCHLORIDE 20 MG/ML
JELLY TOPICAL ONCE
Status: COMPLETED | OUTPATIENT
Start: 2022-03-10 | End: 2022-03-10

## 2022-03-10 RX ADMIN — LORAZEPAM 0.5 MG: 2 INJECTION INTRAMUSCULAR; INTRAVENOUS at 12:45

## 2022-03-10 RX ADMIN — CEFTRIAXONE 1000 MG: 1 INJECTION, POWDER, FOR SOLUTION INTRAMUSCULAR; INTRAVENOUS at 18:11

## 2022-03-10 RX ADMIN — DEXTROSE MONOHYDRATE 5 MG/HR: 50 INJECTION, SOLUTION INTRAVENOUS at 10:25

## 2022-03-10 RX ADMIN — LIDOCAINE HYDROCHLORIDE: 20 JELLY TOPICAL at 12:43

## 2022-03-10 RX ADMIN — OXYMETAZOLINE HYDROCHLORIDE 2 SPRAY: 5 SPRAY NASAL at 12:45

## 2022-03-10 RX ADMIN — METRONIDAZOLE 500 MG: 500 INJECTION, SOLUTION INTRAVENOUS at 18:11

## 2022-03-10 RX ADMIN — HEPARIN SODIUM 2720 UNITS: 1000 INJECTION, SOLUTION INTRAVENOUS; SUBCUTANEOUS at 13:04

## 2022-03-10 RX ADMIN — HEPARIN SODIUM 20 UNITS/KG/HR: 10000 INJECTION, SOLUTION INTRAVENOUS at 13:05

## 2022-03-10 RX ADMIN — SODIUM CHLORIDE, PRESERVATIVE FREE 10 ML: 5 INJECTION INTRAVENOUS at 08:01

## 2022-03-10 RX ADMIN — HEPARIN SODIUM 2720 UNITS: 1000 INJECTION, SOLUTION INTRAVENOUS; SUBCUTANEOUS at 06:15

## 2022-03-10 RX ADMIN — HEPARIN SODIUM 2720 UNITS: 1000 INJECTION, SOLUTION INTRAVENOUS; SUBCUTANEOUS at 22:06

## 2022-03-10 ASSESSMENT — PAIN SCALES - GENERAL: PAINLEVEL_OUTOF10: 0

## 2022-03-10 NOTE — PROGRESS NOTES
Attempted to call [facility] to make aware of consult, there is noone on call at this time.  RN spoke with Dr Chan Shall

## 2022-03-10 NOTE — CONSULTS
GENERAL SURGERY  CONSULT NOTE  3/10/2022    Physician Consulted: Dr. Ulisses Franz   Reason for Consult: Free air on CXR  Referring Physician: Dr. Kimmie Gil     HPI  Cherri Mullins is a 73 y.o. male with hx of developmental delay and afib who presented from his nursing facility 3/10 with nausea, vomiting and increasing abdominal pain. His daughter states that this began Sunday and has progressively worsened. On examination he complains of abdominal pain diffusely but is unable to provide much history. Daughter states she does not believe he has had any fevers, weight loss, diarrhea, dark or bloody BM's. No anticoagulation. Surgical hx includes cholecystectomy. Afebrile, hemodynamically stable at this time   XR reveals small amount of air under right diaphragm but appears similar to prior scan. WBC within normal limits         Past Medical History:   Diagnosis Date    [facility] [address]     Cognitive impairment     Cystoma     Heart failure ([facility] [address] [facility]     Hyperlipidemia     Hypothyroid        Past Surgical History:   Procedure Laterality Date    CHOLECYSTECTOMY         Medications Prior to Admission    Prior to Admission medications    Medication Sig Start Date End Date Taking?  Authorizing Provider   aspirin 81 MG chewable tablet Take 81 mg by mouth daily   Yes Historical Provider, MD   docusate sodium (COLACE) 100 MG capsule Take 100 mg by mouth 2 times daily   Yes Historical Provider, MD   Probiotic Product (PROBIOTIC-10 PO) Take by mouth   Yes Historical Provider, MD   loratadine (CLARITIN) 10 MG capsule Take 10 mg by mouth daily   Yes Historical Provider, MD   megestrol (MEGACE) 40 MG/ML suspension Take 400 mg by mouth daily   Yes Historical Provider, MD   pantoprazole (PROTONIX) 40 MG tablet Take 40 mg by mouth daily   Yes Historical Provider, MD   primidone (MYSOLINE) 50 MG tablet Take 50 mg by mouth in the morning and at bedtime   Yes Historical Provider, MD   propranolol (INDERAL LA) 120 MG extended release capsule Take 120 mg by mouth daily   Yes Historical Provider, MD   topiramate (TOPAMAX) 25 MG tablet Take 25 mg by mouth 2 times daily   Yes Historical Provider, MD   potassium chloride (KLOR-CON M) 20 MEQ extended release tablet Take 20 mEq by mouth daily   Yes Historical Provider, MD   vitamin D 25 MCG (1000 UT) CAPS Take 4,000 Units by mouth daily   Yes Historical Provider, MD       No Known Allergies    History reviewed. No pertinent family history. Social History     Tobacco Use    Smoking status: Never Smoker    Smokeless tobacco: Never Used   Substance Use Topics    Alcohol use: Not Currently    Drug use: Never         Review of Systems: pertinent ROS listed in HPI, all others negative       PHYSICAL EXAM:    Vitals:    03/10/22 0745   BP: (!) 143/69   Pulse: 85   Resp: 18   Temp: 97.8 °F (36.6 °C)   SpO2: 97%       GENERAL:  NAD. HEAD:  Normocephalic. Atraumatic. EYES:   No scleral icterus. PERRL. LUNGS:  No increased work of breathing. CARDIOVASCULAR: RR  ABDOMEN:  Soft, non-distended, diffusely tender. No guarding, rigidity, rebound. EXTREMITIES:   MAEx4. Atraumatic. No LE edema. SKIN:  Warm and dry      ASSESSMENT/PLAN:  73 y.o. male with abd. Pain, n/v concerning for bowel obstruction, currently being treated conservatively, CXR today showing free air under the diaphragm, however he is stable and abdominal exam is relatively reassuring. CT AP to evaluate for pneumoperitoneum   Continue NG to LIWS for conservative management   Monitor abdominal exam and vitals    Plan discussed with Dr. Vadim Lee.     Sruthi Willett MD  Surgery Resident PGY-1  3/10/2022  4:13 PM

## 2022-03-10 NOTE — CARE COORDINATION
This CM called Julissa at  for stat PT initiial eval via VM. Received message back from Janny Cruz, pt is from [facility], they can not take back with IVs or catheters, and must be able to walk independently 75' or more. Should pt be unable to meet the aforementioned goals then, can go to there SNF, NPR, will need negative covid day of discharge. This CM spoke with pt's soni Sen regarding discharge / transition of care plan. Per Shelbie plan is for pt to return to assisted and if unable to then choiced for, [facility] [place] until pt is strong enough to return to assisted. The Plan for Transition of Care is related to the following treatment goals: medical stability    The Patient representative soni Sen was provided with a choice of provider and agrees   with the discharge plan. [x] Yes [] No    Freedom of choice list was provided with basic dialogue that supports the patient's individualized plan of care/goals, treatment preferences and shares the quality data associated with the providers.  [x] Yes [] No

## 2022-03-10 NOTE — PROGRESS NOTES
Patient pulled out NG tube with JOAN watching, per JOAN, \"It happened very fast.\" Call placed to [facility].

## 2022-03-10 NOTE — PROGRESS NOTES
Cardiology consult placed via perfect serve. Jose Cruz MACHADO taking messages    General surgery consult placed to Dr Randy Kerns. Patient information given to answering service.

## 2022-03-10 NOTE — H&P
Morteza Zhou is a 73 y.o. male  presents from assisted living facility for nausea vomiting generalized abdominal pain. Noted be tachycardic on arrival, EKG shows atrial fibrillation with RVR, heart rate in the 160s. Patient given Cardizem bolus and placed on drip. Family states he did have a history of A. fib 10 years ago, has not been on any medication since and has not had any complications since that time. Is not on any blood thinning medications. Daughter states that he was at her home over the weekend, started to develop nausea vomiting and loose stools as of Sunday with associated generalized abdominal pain. Has a history of previous cholecystectomy. work up revealed SBO admitted for treatment     Past Medical History:   Diagnosis Date    A-fib [facility])     Cognitive impairment     Cystoma     [facility] failure ([facility] [address]     Hyperlipidemia     Hypothyroid        Past Surgical History:   Procedure Laterality Date    CHOLECYSTECTOMY         History reviewed. No pertinent family history. Prior to Admission medications    Medication Sig Start Date End Date Taking?  Authorizing Provider   aspirin 81 MG chewable tablet Take 81 mg by mouth daily   Yes Historical Provider, MD   docusate sodium (COLACE) 100 MG capsule Take 100 mg by mouth 2 times daily   Yes Historical Provider, MD   Probiotic Product (PROBIOTIC-10 PO) Take by mouth   Yes Historical Provider, MD   loratadine (CLARITIN) 10 MG capsule Take 10 mg by mouth daily   Yes Historical Provider, MD   megestrol (MEGACE) 40 MG/ML suspension Take 400 mg by mouth daily   Yes Historical Provider, MD   pantoprazole (PROTONIX) 40 MG tablet Take 40 mg by mouth daily   Yes Historical Provider, MD   primidone (MYSOLINE) 50 MG tablet Take 50 mg by mouth in the morning and at bedtime   Yes Historical Provider, MD   propranolol (INDERAL LA) 120 MG extended release capsule Take 120 mg by mouth daily   Yes Historical Provider, MD   topiramate (TOPAMAX) 25 MG tablet Take 25 mg by mouth 2 times daily   Yes Historical Provider, MD   potassium chloride (KLOR-CON M) 20 MEQ extended release tablet Take 20 mEq by mouth daily   Yes Historical Provider, MD   vitamin D 25 MCG (1000 UT) CAPS Take 4,000 Units by mouth daily   Yes Historical Provider, MD        Allergies: Patient has no known allergies. Social History     Tobacco Use    Smoking status: Never Smoker    Smokeless tobacco: Never Used   Substance Use Topics    Alcohol use: Not Currently        Review of Systems:  Respiratory: negative for cough and hemoptysis  Cardiovascular: negative for chest pain and dyspnea  Gastrointestinal: as per present illness   Genitourinary:negative for dysuria and hematuria  Derm: negative for rash and skin lesion(s)  Neurological: negative for seizures and tremors  Endocrine: negative for diabetic symptoms including polydipsia and polyuria    Physical Exam:  Vitals:    03/10/22 0745   BP: (!) 143/69   Pulse: 85   Resp: 18   Temp: 97.8 °F (36.6 °C)   SpO2: 97%      Skin:  Warm and dry. No rash or bruises  HEENT:  PERRLA, EOMI  Neck:  No JVD, No thyromegaly, No carotid bruit  Cardiac:  IRR, No gallop or murmur  Lungs:  CTA, Normal percussion  Abdomen: Normal bowel sounds, no HSM, non-tender  Extremities:  No clubbing, edema or cyanosis  Neurological:  Moves all extremities.     Labs:    CBC with Differential:    Lab Results   Component Value Date    WBC 7.8 03/09/2022    RBC 3.40 03/09/2022    HGB 11.6 03/09/2022    HCT 34.0 03/09/2022     03/09/2022    .0 03/09/2022    MCH 34.1 03/09/2022    MCHC 34.1 03/09/2022    RDW 14.4 03/09/2022    LYMPHOPCT 20.6 03/09/2022    MONOPCT 5.7 03/09/2022    BASOPCT 0.4 03/09/2022    MONOSABS 0.45 03/09/2022    LYMPHSABS 1.61 03/09/2022    EOSABS 0.16 03/09/2022    BASOSABS 0.03 03/09/2022     CMP:    Lab Results   Component Value Date     03/09/2022    K 3.3 03/09/2022     03/09/2022    CO2 16 03/09/2022    BUN 22 03/09/2022    CREATININE 0.9 03/09/2022    GFRAA >60 03/09/2022    LABGLOM >60 03/09/2022    GLUCOSE 141 03/09/2022    PROT 6.7 03/09/2022    LABALBU 3.2 03/09/2022    CALCIUM 8.8 03/09/2022    BILITOT 0.3 03/09/2022    ALKPHOS 42 03/09/2022    AST 10 03/09/2022    ALT 6 03/09/2022      Imaging:Ct scan abdomen:  Small bowel obstruction although the exact etiology and point of obstruction   is not clearly delineated on this examination.       T10 and T11 compression fractures which are probably chronic.       Mild diffuse urinary bladder wall thickening may be due to chronic outlet   obstruction.       Small left inguinal fat and fluid containing hernia.       Large hiatal hernia.       Diverticulosis but no evidence of diverticulitis. Assessment and Plan:    Patient Active Problem List   Diagnosis     A. Fib RVR   SBO  Cognitive impairment

## 2022-03-10 NOTE — PLAN OF CARE
Problem: Tissue Perfusion - Cardiopulmonary, Altered:  Goal: Absence of angina  Description: Absence of angina  Outcome: Ongoing  Goal: Hemodynamic stability will improve  Description: Hemodynamic stability will improve  Outcome: Ongoing     Problem: Skin Integrity:  Goal: Will show no infection signs and symptoms  Description: Will show no infection signs and symptoms  Outcome: Ongoing  Goal: Absence of new skin breakdown  Description: Absence of new skin breakdown  Outcome: Ongoing     Problem: Falls - Risk of:  Goal: Will remain free from falls  Description: Will remain free from falls  Outcome: Ongoing  Goal: Absence of physical injury  Description: Absence of physical injury  Outcome: Ongoing     Problem: Activity:  Goal: Risk for activity intolerance will decrease  Description: Risk for activity intolerance will decrease  Outcome: Ongoing     Problem:  Bowel/Gastric:  Goal: Bowel function will improve  Description: Bowel function will improve  Outcome: Ongoing  Goal: Diagnostic test results will improve  Description: Diagnostic test results will improve  Outcome: Ongoing  Goal: Occurrences of nausea will decrease  Description: Occurrences of nausea will decrease  Outcome: Ongoing  Goal: Occurrences of vomiting will decrease  Description: Occurrences of vomiting will decrease  Outcome: Ongoing     Problem: Fluid Volume:  Goal: Maintenance of adequate hydration will improve  Description: Maintenance of adequate hydration will improve  Outcome: Ongoing     Problem: Health Behavior:  Goal: Ability to state signs and symptoms to report to health care provider will improve  Description: Ability to state signs and symptoms to report to health care provider will improve  Outcome: Ongoing     Problem: Physical Regulation:  Goal: Complications related to the disease process, condition or treatment will be avoided or minimized  Description: Complications related to the disease process, condition or treatment will be avoided or minimized  Outcome: Ongoing  Goal: Ability to maintain clinical measurements within normal limits will improve  Description: Ability to maintain clinical measurements within normal limits will improve  Outcome: Ongoing     Problem: Sensory:  Goal: Ability to identify factors that increase the pain will improve  Description: Ability to identify factors that increase the pain will improve  Outcome: Ongoing  Goal: Ability to notify healthcare provider of pain before it becomes unmanageable or unbearable will improve  Description: Ability to notify healthcare provider of pain before it becomes unmanageable or unbearable will improve  Outcome: Ongoing  Goal: Pain level will decrease  Description: Pain level will decrease  Outcome: Ongoing

## 2022-03-10 NOTE — PROGRESS NOTES
Call placed to surgery regarding orders for NG tube placement. Pt stated that he pulled out the NG because it was \"hurting him\". Asked for lidocaine.

## 2022-03-10 NOTE — PROGRESS NOTES
Progress note: The patient does not appear to be in any distress. The nasogastric tube is in place. I reviewed the plain films of the abdomen performed at 1354 and do not see a change in the study as compared to the x-ray performed on 3/10/2022 at 0827. A STAT CT of the abdomen and pelvis will be performed to evaluate for a pneumoperitoneum. Otherwise, conservative management will be continued.

## 2022-03-10 NOTE — CARE COORDINATION
Messaged liadena Martinez with [employer] to check what pt would need to return, awaiting response at time of review. Chart review, pt sent in for poor oral intake, A-Fib RVR; general surgery consulted for small bowel obstruction, NGT to LISx; cardiology consulted for A-Fib RVR, pt on heparin and cardizem gtt. This CM will check with pt's emergency contact Shelbie chambers) d/t pt is A&O x2 after morning Nx rounds.

## 2022-03-10 NOTE — PROGRESS NOTES
[address]        Date:3/10/2022                                                  Patient Name: Shea Foley    MRN: [id number]    : 1950    Room: [address] [place], [place] [address]          Evaluating OT: Katie Duong OTR/L; [id number]       Referring Provider: Kar Banerjee MD    Specific Provider Orders/Date: OT Eval and Treat 03/10/22      Diagnosis: Small bowel obstruction, hypokalemia, Afib RVR     Surgery: None this admission     Pertinent Medical History:  has a past medical history of A-fib ([facility] [address] Cognitive impairment, Cystoma, Heart failure ([facility] [address] Hyperlipidemia, and Hypothyroid.      Recommended Adaptive Equipment: TBD     Precautions:  Fall Risk, cognition, +bed alarm, TSM     Assessment of current deficits    [x] Functional mobility  [x]ADLs  [x] Strength               [x]Cognition    [x] Functional transfers   [x] IADLs         [x] Safety Awareness   [x]Endurance    [x] Fine Coordination              [x] Balance      [] Vision/perception   []Sensation     []Gross Motor Coordination  [] ROM  [] Delirium                   [] Motor Control     OT PLAN OF CARE   OT POC based on physician orders, patient diagnosis and results of clinical assessment    Frequency/Duration 1-3 days/wk for 2 weeks PRN   Specific OT Treatment Interventions to include:   * Instruction/training on adapted ADL techniques and AE recommendations to increase functional independence within precautions       * Training on energy conservation strategies, correct breathing pattern and techniques to improve independence/tolerance for self-care routine  * Functional transfer/mobility training/DME recommendations for increased independence, safety, and fall prevention  * Patient/Family education to increase follow through with safety techniques and functional independence  * Recommendation of environmental modifications Functional Transfers Sit to stand:[place] Assist   Stand to sit:Minimal Assist  Stand pivot: Minimal Assist  Commode: NT  No use of AD  Sit to stand:Modified [place]   Stand to sit:Modified [place]  Stand pivot: Modified [place]  Commode: Modified [place]    Functional Mobility Minimal Assist  No use of AD within household distance  Modified [place]    Balance Sitting:     Static - SBA     Dynamic - SBA  Standing: Minimal Assist  Sitting:     Static: Independnet     Dynamic: Independent  Standing: Modified [place]   Activity Tolerance Fair  Good   Visual/  Perceptual Glasses: No       Safety Fair-  Good  during ADL completion     Hand Dominance Right   AROM (PROM) Strength Additional Info:  Goal:   RUE  WFL 3/5 grossly tested good  and wfl FMC/dexterity noted during ADL tasks   Improve overall RUE strength WFL for participation in functional tasks       LUE WFL 3/5 grossly tested Good  and wfl FMC/dexterity noted during ADL tasks   Improve overall LUE strength WFL for participation in functional tasks       [facility]   Sensation:  No c/o numbness or tingling BUE  Tone: WFL BUE  Edema: Unremarkable    Comment: Cleared by RN to see pt. Upon arrival patient supine in bed and agreeable to OT session. At end of session, patient seated in bedside chair with call light and phone within reach, +chair alarm, TSM, all lines and tubes intact. Overall patient demonstrated decreased independence and safety during completion of ADL/functional transfer/mobility tasks. Therapist facilitated ADL tasks, functional mobility, functional transfers, bed mobility to promote safety awareness & fall prevention strategies throughout functional activities. Pt would benefit from continued skilled OT to increase safety and independence with completion of ADL/IADL tasks for functional independence and quality of life.     Treatment: OT treatment provided this date includes:    ADL-  Instruction/training on safety and adapted techniques for completion of ADLs: Pt incontinent of urine upon arrival. Pt required assist to don/doff briefs & assist for posterior hygiene while maintaining dynamic standing balance. Pt required assist to don/doff gown for line management & sequencing. Pt educated on safety awareness & proper body mechanics to promote fall prevention strategies.   Mobility-  Instruction/training on safety and improved independence with bed mobility/functional transfers and functional mobility. Pt did not utilize AD. Assist to maintain dynamic standing balance. Pt demo'd narrow DINAH throughout session. Completed sit <> stand transfers x3 trials with cuing for hand placement, safety awareness, & proper body mechanics.   Sitting EOB ~5 minutes to improve dynamic sitting balance and activity tolerance during ADLs. Rehab Potential: Good for established goals     LTG: maximize independence with ADLs to return to PLOF    Patient and/or family were instructed on functional diagnosis, prognosis/goals and OT plan of care. Demonstrated fair understanding. Eval Complexity: Low  · History: Expanded chart review of medical records and additional review of physical, cognitive, or psychosocial history related to current functional performance  · Exam: 3+ performance deficits  · Assistance/Modification: Min/mod assistance or modifications required to perform tasks. May have comorbidities that affect occupational performance. Evaluation time includes thorough review of current medical information, gathering information on past medical & social history & PLOF, completion of standardized testing, informal observation of tasks, consultation with other medical professions/disciplines, assessment of data & development of POC/goals.      Time In: 10:02a  Time Out: 10:25a  Total Treatment Time: 8 minutes    Min Units   OT Eval Low 05610  x     OT Eval Medium 17167      OT Eval High 09453      OT Re-Eval 98014 Therapeutic Ex [id number]       Therapeutic Activities 43744       ADL/Self Care 71140  8 1    Orthotic Management 40878       Manual [id number]     Neuro Re-Ed 25202       Non-Billable Time          Evaluation Time additionally includes thorough review of current medical information, gathering information on past medical history/social history and prior level of function, interpretation of standardized testing/informal observation of tasks, assessment of data and development of plan of care and goals.               Hamida Ventura OTR/L; [id number]

## 2022-03-10 NOTE — PROGRESS NOTES
Physical Therapy  Physical Therapy Initial Assessment     Name: Joe Ponce  : 1950  MRN: [id number]      Date of Service: 3/10/2022    Evaluating PT:  Lana Cobos PT, DPT    Room #:  0594/0347-E  Diagnosis:  Hypokalemia [E87.6]  SBO (small bowel obstruction) ([facility] [address] [K56.609]  Atrial fibrillation with RVR (HCC) [I48.91]  Nausea vomiting and diarrhea [R11.2, R19.7]  Small bowel obstruction ([address] [K56.609]  PMHx/PSHx:  HLD, hypothyroidism, afib, cognitive impairment, CHF  Procedure/Surgery:  N/A  Precautions:  Fall risk, cognition, bed/chair alarm, TSM  Equipment Needs:  TBD    SUBJECTIVE:    Pt a questionable historian. Pt lives at [facility], reportedly ambulates with no AD. OBJECTIVE:   Initial Evaluation  Date: 3/10/22 Treatment Short Term/ Long Term   Goals   AM-PAC 6 Clicks 80/36     Was pt agreeable to Eval/treatment? yes     Does pt have pain?  10/10 abdomen     Bed Mobility  Rolling: min A  Supine to sit: min A  Sit to supine: NT  Scooting: min A  Rolling: IND  Supine to sit: IND  Sit to supine: IND  Scooting: IND   Transfers Sit to stand: min A  Stand to sit: min A  Stand pivot: min A with HHA  Sit to stand: mod I  Stand to sit: mod I  Stand pivot: mod I with AAD   Ambulation    25'x2 with HHA min A  75'+ with AAD mod I   Stair negotiation: ascended and descended  NT       Strength/ROM:   BLE grossly 3+/5  BLE AROM WFL    Balance:   Static Sitting: SBA  Dynamic Sitting: CGA  Static Standing: CGA with HHA  Dynamic Standing: min A with HHA    Pt is A & O x 1 during eval  Sensation:  Pt denies numbness and tingling to extremities  Edema:  unremarkable    Therapeutic Exercises:    Bed mobility: supine<>sit, cued for EOB positioning  Transfers: STSx2, cued for hand placement and postural correction  Ambulation: 20'x2 with HHA  BLE AROM    Patient education  Pt educated on role of PT, importance of functional mobility during hospital stay, safety with transfers and ambulation    Patient response to education:   Pt verbalized understanding Pt demonstrated skill Pt requires further education in this area   yes partial yes     ASSESSMENT:    Conditions Requiring Skilled Therapeutic Intervention:    [x]Decreased strength     []Decreased ROM  [x]Decreased functional mobility  [x]Decreased balance   [x]Decreased endurance   []Decreased posture  []Decreased sensation  []Decreased coordination   []Decreased vision  [x]Decreased safety awareness   [x]Increased pain       Comments:    Pt supine in bed upon entering, agreeable to participate. Pt instructed to transfer to EOB, requiring assist of trunk to complete. Pt sitting upright with good static sitting balance at EOB. No c/o dizziness with position change. Pt cued for hand placement and instructed to stand, demonstrating fair static standing balance with HHA. Pt agreeable to ambulate short distance in the room. Pt demonstrating decreased felipe, narrow DINAH, and increased lateral sway. Assist provided with HHA to assist with pt's balance. Pt instructed to transfer to bedside chair, cued for positioning and hand placement. Pt remained in bedside chair with all needs met, chair alarm on and call bell in reach prior to exiting     Treatment:  Patient practiced and was instructed in the following treatment:     Bed mobility training - pt given verbal and tactile cues to facilitate proper sequencing and safety during rolling and supine>sit as well as provided with physical assistance to complete task    STS and pivot transfer training - pt educated on proper hand and foot placement, safety and sequencing, and use of verbal and tactile cues to safely complete sit<>stand and pivot transfers with physical assistance to complete task safely    Gait training- pt was given verbal and tactile cues to facilitate pt safety during ambulation as well as provided with physical assistance. Pt's/ family goals   1.  Return home    Prognosis is good for reaching above PT goals. Patient and or family understand(s) diagnosis, prognosis, and plan of care. yes    PHYSICAL THERAPY PLAN OF CARE:    PT POC is established based on physician order and patient diagnosis     Referring provider/PT Order:  Marcel Mittal MD  Diagnosis:  Hypokalemia [E87.6]  SBO (small bowel obstruction) ([facility] [address] [K56.609]  Atrial fibrillation with RVR (HCC) [I48.91]  Nausea vomiting and diarrhea [R11.2, R19.7]  Small bowel obstruction ([address] [K56.609]  Specific instructions for next treatment:  Progress as tolerated, transfer and gait training    Current Treatment Recommendations:     [x] Strengthening to improve independence with functional mobility   [] ROM to improve independence with functional mobility   [x] Balance Training to improve static/dynamic balance and to reduce fall risk  [x] Endurance Training to improve activity tolerance during functional mobility   [x] Transfer Training to improve safety and independence with all functional transfers   [x] Gait Training to improve gait mechanics, endurance and assess need for appropriate assistive device  [] Stair Training in preparation for safe discharge home and/or into the community   [x] Positioning to prevent skin breakdown and contractures  [x] Safety and Education Training   [x] Patient/Caregiver Education   [] HEP  [] Other     PT long term treatment goals are located in above grid    Frequency of treatments: 2-5x/week x 1-2 weeks. Time in  0955  Time out  1020    Total Treatment Time  10 minutes     Evaluation Time includes thorough review of current medical information, gathering information on past medical history/social history and prior level of function, completion of standardized testing/informal observation of tasks, assessment of data and education on plan of care and goals.     CPT codes:  [x] Low Complexity PT evaluation 01480  [] Moderate Complexity PT evaluation 38640  [] High Complexity PT evaluation 71287  [] PT Re-evaluation [id number]  [] Gait training 84214 -- minutes  [] Manual therapy [id number] -- minutes  [x] Therapeutic activities 85312 10 minutes  [] Therapeutic exercises 71183 -- minutes  [] Neuromuscular reeducation 38618 -- minutes     Minus Dahlia PT, DPT  [id number]

## 2022-03-10 NOTE — PROGRESS NOTES
Sent message via perfect serve for new consult for a fib rvr to Dr. Leonel Alcantara covering for Dr Giuliano King.     Aroldo Smith RN  6:56 AM

## 2022-03-11 LAB
ALBUMIN SERPL-MCNC: 2.7 G/DL (ref 3.5–5.2)
ALP BLD-CCNC: 40 U/L (ref 40–129)
ALT SERPL-CCNC: 8 U/L (ref 0–40)
ANION GAP SERPL CALCULATED.3IONS-SCNC: 11 MMOL/L (ref 7–16)
APTT: 113.1 SEC (ref 24.5–35.1)
APTT: 49.9 SEC (ref 24.5–35.1)
APTT: 71.5 SEC (ref 24.5–35.1)
AST SERPL-CCNC: 12 U/L (ref 0–39)
BASOPHILS ABSOLUTE: 0.04 E9/L (ref 0–0.2)
BASOPHILS RELATIVE PERCENT: 0.5 % (ref 0–2)
BILIRUB SERPL-MCNC: 0.3 MG/DL (ref 0–1.2)
BUN BLDV-MCNC: 14 MG/DL (ref 6–23)
CALCIUM SERPL-MCNC: 8.1 MG/DL (ref 8.6–10.2)
CHLORIDE BLD-SCNC: 107 MMOL/L (ref 98–107)
CO2: 16 MMOL/L (ref 22–29)
CREAT SERPL-MCNC: 0.7 MG/DL (ref 0.7–1.2)
EOSINOPHILS ABSOLUTE: 0.2 E9/L (ref 0.05–0.5)
EOSINOPHILS RELATIVE PERCENT: 2.5 % (ref 0–6)
GFR AFRICAN AMERICAN: >60
GFR NON-AFRICAN AMERICAN: >60 ML/MIN/1.73
GLUCOSE BLD-MCNC: 121 MG/DL (ref 74–99)
HCT VFR BLD CALC: 31.9 % (ref 37–54)
HEMOGLOBIN: 10.7 G/DL (ref 12.5–16.5)
IMMATURE GRANULOCYTES #: 0.04 E9/L
IMMATURE GRANULOCYTES %: 0.5 % (ref 0–5)
LYMPHOCYTES ABSOLUTE: 1.79 E9/L (ref 1.5–4)
LYMPHOCYTES RELATIVE PERCENT: 22.6 % (ref 20–42)
MAGNESIUM: 2 MG/DL (ref 1.6–2.6)
MCH RBC QN AUTO: 33.8 PG (ref 26–35)
MCHC RBC AUTO-ENTMCNC: 33.5 % (ref 32–34.5)
MCV RBC AUTO: 100.6 FL (ref 80–99.9)
MONOCYTES ABSOLUTE: 0.53 E9/L (ref 0.1–0.95)
MONOCYTES RELATIVE PERCENT: 6.7 % (ref 2–12)
NEUTROPHILS ABSOLUTE: 5.31 E9/L (ref 1.8–7.3)
NEUTROPHILS RELATIVE PERCENT: 67.2 % (ref 43–80)
PDW BLD-RTO: 14.2 FL (ref 11.5–15)
PLATELET # BLD: 418 E9/L (ref 130–450)
PMV BLD AUTO: 9.9 FL (ref 7–12)
POTASSIUM SERPL-SCNC: 3.2 MMOL/L (ref 3.5–5)
RBC # BLD: 3.17 E12/L (ref 3.8–5.8)
SODIUM BLD-SCNC: 134 MMOL/L (ref 132–146)
TOTAL PROTEIN: 5.5 G/DL (ref 6.4–8.3)
WBC # BLD: 7.9 E9/L (ref 4.5–11.5)

## 2022-03-11 PROCEDURE — 2500000003 HC RX 250 WO HCPCS: Performed by: STUDENT IN AN ORGANIZED HEALTH CARE EDUCATION/TRAINING PROGRAM

## 2022-03-11 PROCEDURE — 6360000002 HC RX W HCPCS: Performed by: STUDENT IN AN ORGANIZED HEALTH CARE EDUCATION/TRAINING PROGRAM

## 2022-03-11 PROCEDURE — 6370000000 HC RX 637 (ALT 250 FOR IP): Performed by: INTERNAL MEDICINE

## 2022-03-11 PROCEDURE — 83735 ASSAY OF MAGNESIUM: CPT

## 2022-03-11 PROCEDURE — 99233 SBSQ HOSP IP/OBS HIGH 50: CPT | Performed by: INTERNAL MEDICINE

## 2022-03-11 PROCEDURE — 36415 COLL VENOUS BLD VENIPUNCTURE: CPT

## 2022-03-11 PROCEDURE — 80053 COMPREHEN METABOLIC PANEL: CPT

## 2022-03-11 PROCEDURE — 85025 COMPLETE CBC W/AUTO DIFF WBC: CPT

## 2022-03-11 PROCEDURE — 2060000000 HC ICU INTERMEDIATE R&B

## 2022-03-11 PROCEDURE — 2580000003 HC RX 258: Performed by: STUDENT IN AN ORGANIZED HEALTH CARE EDUCATION/TRAINING PROGRAM

## 2022-03-11 PROCEDURE — 85730 THROMBOPLASTIN TIME PARTIAL: CPT

## 2022-03-11 RX ORDER — POTASSIUM CHLORIDE 20 MEQ/1
20 TABLET, EXTENDED RELEASE ORAL DAILY
Status: DISCONTINUED | OUTPATIENT
Start: 2022-03-11 | End: 2022-03-13

## 2022-03-11 RX ORDER — POTASSIUM CHLORIDE 20 MEQ/1
40 TABLET, EXTENDED RELEASE ORAL ONCE
Status: COMPLETED | OUTPATIENT
Start: 2022-03-11 | End: 2022-03-11

## 2022-03-11 RX ORDER — PRIMIDONE 50 MG/1
50 TABLET ORAL 2 TIMES DAILY
Status: DISCONTINUED | OUTPATIENT
Start: 2022-03-11 | End: 2022-03-15 | Stop reason: HOSPADM

## 2022-03-11 RX ORDER — TOPIRAMATE 25 MG/1
25 TABLET ORAL 2 TIMES DAILY
Status: DISCONTINUED | OUTPATIENT
Start: 2022-03-11 | End: 2022-03-15 | Stop reason: HOSPADM

## 2022-03-11 RX ORDER — DOCUSATE SODIUM 100 MG/1
100 CAPSULE, LIQUID FILLED ORAL 2 TIMES DAILY
Status: DISCONTINUED | OUTPATIENT
Start: 2022-03-11 | End: 2022-03-15 | Stop reason: HOSPADM

## 2022-03-11 RX ORDER — METOPROLOL SUCCINATE 25 MG/1
12.5 TABLET, EXTENDED RELEASE ORAL DAILY
Status: DISCONTINUED | OUTPATIENT
Start: 2022-03-11 | End: 2022-03-12

## 2022-03-11 RX ORDER — ASPIRIN 81 MG/1
81 TABLET, CHEWABLE ORAL DAILY
Status: DISCONTINUED | OUTPATIENT
Start: 2022-03-12 | End: 2022-03-12

## 2022-03-11 RX ORDER — VITAMIN B COMPLEX
4000 TABLET ORAL DAILY
Status: DISCONTINUED | OUTPATIENT
Start: 2022-03-11 | End: 2022-03-15 | Stop reason: HOSPADM

## 2022-03-11 RX ORDER — PANTOPRAZOLE SODIUM 40 MG/1
40 TABLET, DELAYED RELEASE ORAL DAILY
Status: DISCONTINUED | OUTPATIENT
Start: 2022-03-11 | End: 2022-03-15 | Stop reason: HOSPADM

## 2022-03-11 RX ORDER — CETIRIZINE HYDROCHLORIDE 10 MG/1
10 TABLET ORAL DAILY
Status: DISCONTINUED | OUTPATIENT
Start: 2022-03-11 | End: 2022-03-15 | Stop reason: HOSPADM

## 2022-03-11 RX ADMIN — PANTOPRAZOLE SODIUM 40 MG: 40 TABLET, DELAYED RELEASE ORAL at 13:31

## 2022-03-11 RX ADMIN — METOPROLOL SUCCINATE 12.5 MG: 25 TABLET, EXTENDED RELEASE ORAL at 13:32

## 2022-03-11 RX ADMIN — METRONIDAZOLE 500 MG: 500 INJECTION, SOLUTION INTRAVENOUS at 10:30

## 2022-03-11 RX ADMIN — LEVOTHYROXINE SODIUM 75 MCG: 0.03 TABLET ORAL at 13:31

## 2022-03-11 RX ADMIN — DOCUSATE SODIUM 100 MG: 100 CAPSULE, LIQUID FILLED ORAL at 13:31

## 2022-03-11 RX ADMIN — METRONIDAZOLE 500 MG: 500 INJECTION, SOLUTION INTRAVENOUS at 02:15

## 2022-03-11 RX ADMIN — CEFTRIAXONE 1000 MG: 1 INJECTION, POWDER, FOR SOLUTION INTRAMUSCULAR; INTRAVENOUS at 18:11

## 2022-03-11 RX ADMIN — Medication 4000 UNITS: at 13:31

## 2022-03-11 RX ADMIN — HEPARIN SODIUM 1360 UNITS: 1000 INJECTION, SOLUTION INTRAVENOUS; SUBCUTANEOUS at 06:50

## 2022-03-11 RX ADMIN — PRIMIDONE 50 MG: 50 TABLET ORAL at 22:37

## 2022-03-11 RX ADMIN — HEPARIN SODIUM 24 UNITS/KG/HR: 10000 INJECTION, SOLUTION INTRAVENOUS at 03:10

## 2022-03-11 RX ADMIN — POTASSIUM CHLORIDE 40 MEQ: 20 TABLET, EXTENDED RELEASE ORAL at 08:53

## 2022-03-11 RX ADMIN — DOCUSATE SODIUM 100 MG: 100 CAPSULE, LIQUID FILLED ORAL at 22:37

## 2022-03-11 RX ADMIN — METRONIDAZOLE 500 MG: 500 INJECTION, SOLUTION INTRAVENOUS at 18:12

## 2022-03-11 RX ADMIN — TOPIRAMATE 25 MG: 25 TABLET, FILM COATED ORAL at 22:37

## 2022-03-11 ASSESSMENT — PAIN SCALES - GENERAL: PAINLEVEL_OUTOF10: 0

## 2022-03-11 NOTE — PROGRESS NOTES
Admit Date: 3/9/2022    Subjective:     Awake NAD   Ct scan abdomen no pneumoperitoneum     Objective:     Patient Vitals for the past 8 hrs:   BP Temp Temp src Pulse Resp SpO2   03/11/22 0500 108/62 -- -- 109 -- --   03/11/22 0300 -- -- -- 112 -- --   03/11/22 0030 (!) 102/57 98.7 °F (37.1 °C) Temporal 97 18 95 %     No intake/output data recorded. No intake/output data recorded. HEENT: Normal  NECK: Thyroid normal. No carotid bruit. No lymphphadenopathy. CVS: IRR rate controlled   RS: Clear. No wheeze. No rhonchi. ABD: Soft. Non tender. No mass. Decreased  BS. EXT: No edema. Non tender. Pulses present. NEURO: no focal deficit       Scheduled Meds:   cefTRIAXone (ROCEPHIN) IV  1,000 mg IntraVENous Q24H    metroNIDAZOLE  500 mg IntraVENous Q8H    sodium chloride  250 mL IntraVENous Once     Continuous Infusions:   dilTIAZem 7.5 mg/hr (03/11/22 0609)    heparin (PORCINE) Infusion 24 Units/kg/hr (03/11/22 0310)       CBC with Differential:    Lab Results   Component Value Date    WBC 7.8 03/09/2022    RBC 3.40 03/09/2022    HGB 11.6 03/09/2022    HCT 34.0 03/09/2022     03/09/2022    .0 03/09/2022    MCH 34.1 03/09/2022    MCHC 34.1 03/09/2022    RDW 14.4 03/09/2022    LYMPHOPCT 20.6 03/09/2022    MONOPCT 5.7 03/09/2022    BASOPCT 0.4 03/09/2022    MONOSABS 0.45 03/09/2022    LYMPHSABS 1.61 03/09/2022    EOSABS 0.16 03/09/2022    BASOSABS 0.03 03/09/2022     CMP:    Lab Results   Component Value Date     03/09/2022    K 3.3 03/09/2022     03/09/2022    CO2 16 03/09/2022    BUN 22 03/09/2022    CREATININE 0.9 03/09/2022    GFRAA >60 03/09/2022    LABGLOM >60 03/09/2022    PROT 6.7 03/09/2022    LABALBU 3.2 03/09/2022    CALCIUM 8.8 03/09/2022    BILITOT 0.3 03/09/2022    ALKPHOS 42 03/09/2022    AST 10 03/09/2022    ALT 6 03/09/2022     Lab. This Am pending     Assessment:     Principal Problem: A. Fib RVR   SBO  Cognitive impairment       Plan:   Continue same management NG per surgery

## 2022-03-11 NOTE — PROGRESS NOTES
INPATIENT CARDIOLOGY CONSULT follow-up visit    Name: Caroline Giraldo    Age: 73 y.o. Date of Admission: 3/9/2022 12:10 PM    Date of Service: 3/11/2022    Reason for Consultation: Atrial fibrillation    Chief Complaint   Patient presents with    Tachycardia     PT was tachy rate of 160's from SNF, sent d/t poor appetite and fluid intake. Referring Physician: Pravin Pierce MD    Subjective: Denies chest pain. Past Medical History:  Past Medical History:   Diagnosis Date    A-fib ([facility] [address]     Cognitive impairment     Cystoma     Heart failure ([facility] [address]     Hyperlipidemia     Hypothyroid        Past Surgical History:  Past Surgical History:   Procedure Laterality Date    CHOLECYSTECTOMY         Family History:  History reviewed. No pertinent family history. Social History:  Social History     Socioeconomic History    Marital status: Single     Spouse name: Not on file    Number of children: Not on file    Years of education: Not on file    Highest education level: Not on file   Occupational History    Not on file   Tobacco Use    Smoking status: Never Smoker    Smokeless tobacco: Never Used   Substance and Sexual Activity    Alcohol use: Not Currently    Drug use: Never    Sexual activity: Not on file   Other Topics Concern    Not on file   Social History Narrative    Not on file     Social Determinants of Health     Financial Resource Strain:     Difficulty of Paying Living Expenses: Not on file   Food Insecurity:     Worried About Running Out of Food in the Last Year: Not on file    Humberto of Food in the Last Year: Not on file   Transportation Needs:     Lack of Transportation (Medical): Not on file    Lack of Transportation (Non-Medical):  Not on file   Physical Activity:     Days of Exercise per Week: Not on file    Minutes of Exercise per Session: Not on file   Stress:     Feeling of Stress : Not on file   Social Connections:     Frequency of Communication with Friends and Family: Not on file    Frequency of Social Gatherings with Friends and Family: Not on file    Attends [employer] Services: Not on file    Active Member of Clubs or Organizations: Not on file    Attends Club or Organization Meetings: Not on file    Marital Status: Not on file   Intimate Partner Violence:     Fear of Current or Ex-Partner: Not on file    Emotionally Abused: Not on file    Physically Abused: Not on file    Sexually Abused: Not on file   Housing Stability:     Unable to Pay for Housing in the Last Year: Not on file    Number of [place] in the Last Year: Not on file    Unstable Housing in the Last Year: Not on file       Allergies:  No Known Allergies    Home Medications:  Prior to Admission medications    Medication Sig Start Date End Date Taking?  Authorizing Provider   aspirin 81 MG chewable tablet Take 81 mg by mouth daily   Yes Historical Provider, MD   docusate sodium (COLACE) 100 MG capsule Take 100 mg by mouth 2 times daily   Yes Historical Provider, MD   Probiotic Product (PROBIOTIC-10 PO) Take by mouth   Yes Historical Provider, MD   loratadine (CLARITIN) 10 MG capsule Take 10 mg by mouth daily   Yes Historical Provider, MD   megestrol (MEGACE) 40 MG/ML suspension Take 400 mg by mouth daily   Yes Historical Provider, MD   pantoprazole (PROTONIX) 40 MG tablet Take 40 mg by mouth daily   Yes Historical Provider, MD   primidone (MYSOLINE) 50 MG tablet Take 50 mg by mouth in the morning and at bedtime   Yes Historical Provider, MD   propranolol (INDERAL LA) 120 MG extended release capsule Take 120 mg by mouth daily   Yes Historical Provider, MD   topiramate (TOPAMAX) 25 MG tablet Take 25 mg by mouth 2 times daily   Yes Historical Provider, MD   potassium chloride (KLOR-CON M) 20 MEQ extended release tablet Take 20 mEq by mouth daily   Yes Historical Provider, MD   vitamin D 25 MCG (1000 UT) CAPS Take 4,000 Units by mouth daily   Yes Historical Provider, MD       Current Medications:  Current Facility-Administered Medications   Medication Dose Route Frequency Provider Last Rate Last Admin    metoprolol succinate (TOPROL XL) extended release tablet 12.5 mg  12.5 mg Oral Daily Lawrence Rodriguez MD   12.5 mg at 03/11/22 1332    pantoprazole (PROTONIX) tablet 40 mg  40 mg Oral Daily Barb Dickerson MD   40 mg at 03/11/22 1331    primidone (MYSOLINE) tablet 50 mg  50 mg Oral BID Barb Dickerson MD        Vitamin D (CHOLECALCIFEROL) tablet 4,000 Units  4,000 Units Oral Daily Barb Dickerson MD   4,000 Units at 03/11/22 1331    topiramate (TOPAMAX) tablet 25 mg  25 mg Oral BID Barb Dickerson MD        cetirizine (ZYRTEC) tablet 10 mg  10 mg Oral Daily Barb Dickerson MD        [START ON 3/12/2022] aspirin chewable tablet 81 mg  81 mg Oral Daily Barb Dickerson MD        docusate sodium (COLACE) capsule 100 mg  100 mg Oral BID Barb Dickerson MD   100 mg at 03/11/22 1331    potassium chloride (KLOR-CON M) extended release tablet 20 mEq  20 mEq Oral Daily Barb Dickerson MD        levothyroxine (SYNTHROID) tablet 75 mcg  75 mcg Oral Daily Barb Dickerson MD   75 mcg at 03/11/22 1331    LORazepam (ATIVAN) injection 0.5 mg  0.5 mg IntraVENous Q8H PRN Barb Dickerson MD   0.5 mg at 03/10/22 1245    cefTRIAXone (ROCEPHIN) 1,000 mg in sterile water 10 mL IV syringe  1,000 mg IntraVENous Q24H Julita Arthur DO   1,000 mg at 03/10/22 1811    metronidazole (FLAGYL) 500 mg in NaCl 100 mL IVPB premix  500 mg IntraVENous Q8H Julita Arthur DO   Stopped at 03/11/22 1216    dilTIAZem 100 mg in dextrose 5 % 100 mL infusion (ADD-Orick)  2.5-15 mg/hr IntraVENous Continuous Mik Gomez MD   Stopped at 03/11/22 0800    0.9 % sodium chloride bolus  250 mL IntraVENous Once Jennifer Carnes DO        heparin (porcine) injection 2,720 Units  60 Units/kg IntraVENous PRN Jennifer Bulls, DO   2,720 Units at 03/10/22 2206    heparin (porcine) injection 1,360 Units  30 Units/kg IntraVENous PRN Lemon Fell, DO   1,360 Units at 03/11/22 0650    heparin 25,000 units in dextrose 5% 250 mL (premix) infusion  5-30 Units/kg/hr IntraVENous Continuous Lemon Fell, DO 11.8 mL/hr at 03/11/22 0649 26 Units/kg/hr at 03/11/22 0649      dilTIAZem Stopped (03/11/22 0800)    heparin (PORCINE) Infusion 26 Units/kg/hr (03/11/22 0649)       Physical Exam:  /63   Pulse 95   Temp 98.2 °F (36.8 °C) (Temporal)   Resp 18   Wt 100 lb (45.4 kg)   SpO2 96%   Wt Readings from Last 3 Encounters:   03/09/22 100 lb (45.4 kg)       Appearance: Alert and oriented x3 not in acute distress. Skin: Dry skin  Head: Atraumatic  Eyes: Intact extraocular muscles   ENMT: Mucous membranes are moist  Neck: Supple  Lungs: Clear to auscultation  Cardiac: Normal S1 and S2  Abdomen: Protuberant  Extremities: Intact range of motion  Neurologic: No focal neurological deficits  Peripheral Pulses: 2+ peripheral pulses    Intake/Output:    Intake/Output Summary (Last 24 hours) at 3/11/2022 1550  Last data filed at 3/11/2022 1306  Gross per 24 hour   Intake 0 ml   Output 0 ml   Net 0 ml     No intake/output data recorded.     Laboratory Tests:  Recent Labs     03/09/22  1250 03/11/22  0530    134   K 3.3* 3.2*    107   CO2 16* 16*   BUN 22 14   CREATININE 0.9 0.7   GLUCOSE 141* 121*   CALCIUM 8.8 8.1*     Lab Results   Component Value Date    MG 2.0 03/11/2022    MG 2.0 03/09/2022     Recent Labs     03/09/22  1250 03/11/22  0530   ALKPHOS 42 40   ALT 6 8   AST 10 12   PROT 6.7 5.5*   BILITOT 0.3 0.3   LABALBU 3.2* 2.7*     Recent Labs     03/09/22  1250 03/09/22  1830 03/11/22  0530   WBC 7.2 7.8 7.9   RBC 3.60* 3.40* 3.17*   HGB 12.1* 11.6* 10.7*   HCT 36.0* 34.0* 31.9*   .0* 100.0* 100.6*   MCH 33.6 34.1 33.8   MCHC 33.6 34.1 33.5   RDW 14.3 14.4 14.2   * 460* 418   MPV 9.1 9.8 9.9     No results found for: CKTOTAL, CKMB, CKMBINDEX, TROPONINI  Recent Labs     03/09/22  1250 03/09/22  1442 TROPHS 14* 14*     No results found for: INR, PROTIME  Lab Results   Component Value Date    TSH 9.900 (H) 03/09/2022     No results found for: LABA1C  No results found for: EAG  No results found for: CHOL  No results found for: TRIG  No results found for: HDL  No results found for: LDLCALC, LDLCHOLESTEROL  No results found for: LABVLDL, VLDL  No results found for: CHOLHDLRATIO  No results for input(s): PROBNP in the last 72 hours. Cardiac Tests:      Echocardiogram reviewed:     Stress test reviewed:      Cardiac catheterization reviewed:     CXR reviewed: The ASCVD Risk score (Kathy Boland, et al., 2013) failed to calculate for the following reasons:    Cannot find a previous HDL lab    Cannot find a previous total cholesterol lab    ASSESSMENT / PLAN:    1. Atrial fibrillation with RVR (HCC)  Initiate metoprolol succinate 12.5 mg daily for rate control since patient has resumed diet  Continue anticoagulation and change to Eliquis when ready to be discharged      2. Nausea vomiting and diarrhea  Management per primary and surgery  3. SBO (small bowel obstruction) (HCC)  Management per surgery    4. Hypokalemia  Management per primary service    5. Mild troponin leak  Echocardiogram to guide therapy  Ischemic evaluation once patient is stable          Thank you for allowing me to participate in your patient's care. Please feel free to contact me if you have any questions or concerns.     Penny Dias MD  [facility]) Cardiology

## 2022-03-11 NOTE — FLOWSHEET NOTE
NGT reinserted into right nare at bumper 60. Immediate fluid return and positive auscultation of air in abdomen. CXR ordered to confirm placement. Placed sitter at bedside to avoid further attempts at ngt removal.     2240: cxr read and ngt hooked to LIRESHMA.

## 2022-03-11 NOTE — PROGRESS NOTES
General Surgery Progress Note    Repeat scans show no evidence of free air. CT demonstrated loops of bowel that was likely causing appearance of free air on XR as well as diverticulitis which may be the source of his abdominal pain. No plan for surgical intervention. Ok for diet and anticoagulation from our perspective. Please reach out should questions or concerns arise. Patient resting comfortably this morning, nurse stated no acute issues overnight. He is ok for diet from our perspective if he passes SLP    Electronically signed by Tammi Henderson MD on 3/11/2022 at 6:45 AM     The patient was seen and examined the chart was reviewed. I agree with the assessment and plan. The CT of the abdomen and pelvis did not reveal any significant change. In particular, I did not appreciate any pneumoperitoneum. The nasogastric tube will be removed. The patient will be treated for acute diverticulitis.

## 2022-03-11 NOTE — DISCHARGE INSTR - COC
Continuity of Care Form    Patient Name: Caroline Giraldo   :  1950  MRN:  [id number]    Admit date:  3/9/2022  Discharge date:  ***    Code Status Order: No Order   Advance Directives:      Admitting Physician:  Pravin Pierce MD  PCP: Pravin Pierce MD    Discharging Nurse: [facility] Unit/Room#: 8642/2068-O  Discharging Unit Phone Number: ***    Emergency Contact:   Extended Emergency Contact Information  Primary Emergency Contact: [address] Phone: [phone]  Relation: Niece/Nephew   needed? No  Secondary Emergency Contact: [address] Phone: [phone]  Relation: Niece/Nephew   needed? No    Past Surgical History:  Past Surgical History:   Procedure Laterality Date    CHOLECYSTECTOMY         Immunization History: There is no immunization history on file for this patient.     Active Problems:  Patient Active Problem List   Diagnosis Code    Small bowel obstruction ([facility] [address] K56.609       Isolation/Infection:   Isolation            No Isolation          Patient Infection Status       Infection Onset Added Last Indicated Last Indicated By Review Planned Expiration Resolved Resolved By    None active    Resolved    C-diff Rule Out 22 Clostridium difficile EIA (Ordered)   03/10/22 Suni Werner, RN    Canceled None Wilfredo Figueroa RN 3/10/22 0750     COVID-19 (Rule Out) 22 COVID-19, Rapid (Ordered)   22 Rule-Out Test Resulted            Nurse Assessment:  Last Vital Signs: BP (!) 90/50   Pulse 94   Temp 98.2 °F (36.8 °C) (Temporal)   Resp 18   Wt 100 lb (45.4 kg)   SpO2 95%     Last documented pain score (0-10 scale): Pain Level: 0  Last Weight:   Wt Readings from Last 1 Encounters:   22 100 lb (45.4 kg)     Mental Status:  alert    IV Access:  - None    Nursing Mobility/ADLs:  Walking   Assisted  Transfer  Assisted  Bathing  Dependent  Dressing  Dependent  Toileting Assisted  Feeding  Assisted  Med Admin  Assisted  Med Delivery   whole    Wound Care Documentation and Therapy:        Elimination:  Continence: Bowel: No  Bladder: No  Urinary Catheter: None   Colostomy/Ileostomy/Ileal Conduit: YES / NO:24648}{       Date of Last BM: 03/15/2022      Intake/Output Summary (Last 24 hours) at 3/11/2022 1011  Last data filed at 3/10/2022 1930  Gross per 24 hour   Intake 0 ml   Output 0 ml   Net 0 ml     No intake/output data recorded. Safety Concerns:     History of Falls (last 30 days)    Impairments/Disabilities:      Vision    Nutrition Therapy:  Current Nutrition Therapy:   - Oral Diet:  General    Routes of Feeding: Oral  Liquids: Thin Liquids  Daily Fluid Restriction: no  Last Modified Barium Swallow with Video (Video Swallowing Test): not done    Treatments at the Time of Hospital Discharge:   Respiratory Treatments:     Oxygen Therapy:  is not on home oxygen therapy.   Ventilator:    - No ventilator support    Rehab Therapies: Physical Therapy and Occupational Therapy  Weight Bearing Status/Restrictions: No weight bearing restrictions  Other Medical Equipment (for information only, NOT a DME order):  wheelchair  Other Treatments: ***    Patient's personal belongings (please select all that are sent with patient):  None    RN SIGNATURE:  Electronically signed by Davide Zhou RN on 3/15/22 at 2:44 PM EDT    CASE MANAGEMENT/SOCIAL WORK SECTION    Inpatient Status Date: ***    Readmission Risk Assessment Score:  Readmission Risk              Risk of Unplanned Readmission:  13           Discharging to Facility/ Agency   Name: [facility]  Address:  Phone:  Fax:    Dialysis Facility (if applicable)   Name:  Address:  Dialysis Schedule:  Phone:  Fax:    / signature: Electronically signed by Elaine Mireles RN on 3/11/22 at 10:11 AM EST    PHYSICIAN SECTION    Prognosis: {Prognosis:8635284546}    Condition at Discharge: Kuldip Magaña Patient Condition:[id number]}    Rehab Potential (if transferring to Rehab): {Prognosis:8118880572}    Recommended Labs or Other Treatments After Discharge: ***    Physician Certification: I certify the above information and transfer of Judy Reyno  is necessary for the continuing treatment of the diagnosis listed and that he requires {Admit to Appropriate Level of Care:90471} for {GREATER/LESS:978403021} 30 days.      Update Admission H&P: {CHP DME Changes in AYVRB:067621270}    PHYSICIAN SIGNATURE:  Marcle Mittal MD.

## 2022-03-11 NOTE — PLAN OF CARE
Problem: Falls - Risk of:  Goal: Absence of physical injury  Description: Absence of physical injury  3/11/2022 1040 by More Landa RN  Outcome: Met This Shift  3/11/2022 1039 by Jordon Stein RN  Outcome: Met This Shift     Problem: Falls - Risk of:  Goal: Will remain free from falls  Description: Will remain free from falls  3/11/2022 1040 by More Landa RN  Outcome: Not Met This Shift  3/11/2022 1039 by Jordon Stein RN  Outcome: Not Met This Shift

## 2022-03-11 NOTE — CONSULTS
INPATIENT CARDIOLOGY CONSULT    Name: Natalia Billingsley    Age: 73 y.o. Date of Admission: 3/9/2022 12:10 PM    Date of Service: 3/10/2022    Reason for Consultation: Atrial fibrillation    Chief Complaint   Patient presents with    Tachycardia     PT was tachy rate of 160's from SNF, sent d/t poor appetite and fluid intake. Referring Physician: Kat Arthur MD    History of Present Illness: 72-year-old male with history of cystoma, heart failure, hypertension, hyperlipidemia, hypothyroidism and cognitive impairment who was evaluated at the emergency room from assisted living due to nausea, abdominal pain and vomiting and found to have small bowel obstruction as well as atrial fibrillation with rapid ventricular response. Cardiology consulted for further evaluation. I spoke with patient at length and currently denies chest pain and he converted to normal sinus rhythm overnight. He has NG tube and undergoing evaluation to rule out pneumoperitoneum        Review of Systems:   Cardiac: As per HPI  General: Denies fever or chills  Pulmonary: As per HPI  HEENT: Denies runny nose  GI: No complaints  : No complaints  Endocrine: Denies night sweats  Musculoskeletal: No complaints  Skin: Dry skin  Neuro: No complaints  Psych: Denies depression    Past Medical History:  Past Medical History:   Diagnosis Date    [facility]fib ([facility] [address]     Cognitive impairment     Cystoma     Heart failure ([facility] [address] [facility]     Hyperlipidemia     Hypothyroid        Past Surgical History:  Past Surgical History:   Procedure Laterality Date    CHOLECYSTECTOMY         Family History:  History reviewed. No pertinent family history.     Social History:  Social History     Socioeconomic History    Marital status: Single     Spouse name: Not on file    Number of children: Not on file    Years of education: Not on file    Highest education level: Not on file   Occupational History    Not on file   Tobacco Use    Smoking status: Never Smoker  Smokeless tobacco: Never Used   Substance and Sexual Activity    Alcohol use: Not Currently    Drug use: Never    Sexual activity: Not on file   Other Topics Concern    Not on file   Social History Narrative    Not on file     Social Determinants of Health     Financial Resource Strain:     Difficulty of Paying Living Expenses: Not on file   Food Insecurity:     Worried About Running Out of Food in the Last Year: Not on file    Humberto of Food in the Last Year: Not on file   Transportation Needs:     Lack of Transportation (Medical): Not on file    Lack of Transportation (Non-Medical): Not on file   Physical Activity:     Days of Exercise per Week: Not on file    Minutes of Exercise per Session: Not on file   Stress:     Feeling of Stress : Not on file   Social Connections:     Frequency of Communication with Friends and Family: Not on file    Frequency of Social Gatherings with Friends and Family: Not on file    Attends [employer] Services: Not on file    Active Member of [address] [place] [address] or Organizations: Not on file    Attends Club or Organization Meetings: Not on file    Marital Status: Not on file   Intimate Partner Violence:     Fear of Current or Ex-Partner: Not on file    Emotionally Abused: Not on file    Physically Abused: Not on file    Sexually Abused: Not on file   Housing Stability:     Unable to Pay for Housing in the Last Year: Not on file    Number of [place] in the Last Year: Not on file    Unstable Housing in the Last Year: Not on file       Allergies:  No Known Allergies    Home Medications:  Prior to Admission medications    Medication Sig Start Date End Date Taking?  Authorizing Provider   aspirin 81 MG chewable tablet Take 81 mg by mouth daily   Yes Historical Provider, MD   docusate sodium (COLACE) 100 MG capsule Take 100 mg by mouth 2 times daily   Yes Historical Provider, MD   Probiotic Product (PROBIOTIC-10 PO) Take by mouth   Yes Historical Provider, MD   loratadine (CLARITIN) 10 MG capsule Take 10 mg by mouth daily   Yes Historical Provider, MD   megestrol (MEGACE) 40 MG/ML suspension Take 400 mg by mouth daily   Yes Historical Provider, MD   pantoprazole (PROTONIX) 40 MG tablet Take 40 mg by mouth daily   Yes Historical Provider, MD   primidone (MYSOLINE) 50 MG tablet Take 50 mg by mouth in the morning and at bedtime   Yes Historical Provider, MD   propranolol (INDERAL LA) 120 MG extended release capsule Take 120 mg by mouth daily   Yes Historical Provider, MD   topiramate (TOPAMAX) 25 MG tablet Take 25 mg by mouth 2 times daily   Yes Historical Provider, MD   potassium chloride (KLOR-CON M) 20 MEQ extended release tablet Take 20 mEq by mouth daily   Yes Historical Provider, MD   vitamin D 25 MCG (1000 UT) CAPS Take 4,000 Units by mouth daily   Yes Historical Provider, MD       Current Medications:  Current Facility-Administered Medications   Medication Dose Route Frequency Provider Last Rate Last Admin    LORazepam (ATIVAN) injection 0.5 mg  0.5 mg IntraVENous Q8H PRN Barb Dickerson MD   0.5 mg at 03/10/22 1245    cefTRIAXone (ROCEPHIN) 1,000 mg in sterile water 10 mL IV syringe  1,000 mg IntraVENous Q24H Julita Arthur, DO   1,000 mg at 03/10/22 1811    metronidazole (FLAGYL) 500 mg in NaCl 100 mL IVPB premix  500 mg IntraVENous Q8H Julita Arthur, DO   Stopped at 03/10/22 1902    dilTIAZem 100 mg in dextrose 5 % 100 mL infusion ([facility])  2.5-15 mg/hr IntraVENous Continuous Mik Gomez MD 5 mL/hr at 03/10/22 1025 5 mg/hr at 03/10/22 1025    0.9 % sodium chloride bolus  250 mL IntraVENous Once Jennifer Bulls, DO        heparin (porcine) injection 2,720 Units  60 Units/kg IntraVENous PRN Jennifer Bulls, DO   2,720 Units at 03/10/22 1304    heparin (porcine) injection 1,360 Units  30 Units/kg IntraVENous PRN Jennifer Bulls, DO        heparin 25,000 units in dextrose 5% 250 mL (premix) infusion  5-30 Units/kg/hr IntraVENous Continuous Jennifer Bulls, DO 9.1 mL/hr at 03/10/22 1305 20 Units/kg/hr at 03/10/22 1305      dilTIAZem 5 mg/hr (03/10/22 1025)    heparin (PORCINE) Infusion 20 Units/kg/hr (03/10/22 1305)       Physical Exam:  BP (!) 143/69   Pulse 85   Temp 97.8 °F (36.6 °C) (Temporal)   Resp 18   Wt 100 lb (45.4 kg)   SpO2 97%   Wt Readings from Last 3 Encounters:   03/09/22 100 lb (45.4 kg)       Appearance: Alert and oriented x3 not in acute distress. Skin: Dry skin  Head: Atraumatic  Eyes: Intact extraocular muscles   ENMT: Mucous membranes are moist  Neck: Supple  Lungs: Clear to auscultation  Cardiac: Normal S1 and S2  Abdomen: Protuberant  Extremities: Intact range of motion  Neurologic: No focal neurological deficits  Peripheral Pulses: 2+ peripheral pulses    Intake/Output:    Intake/Output Summary (Last 24 hours) at 3/10/2022 1902  Last data filed at 3/10/2022 1331  Gross per 24 hour   Intake 0 ml   Output --   Net 0 ml     No intake/output data recorded.     Laboratory Tests:  Recent Labs     03/09/22  1250      K 3.3*      CO2 16*   BUN 22   CREATININE 0.9   GLUCOSE 141*   CALCIUM 8.8     Lab Results   Component Value Date    MG 2.0 03/09/2022     Recent Labs     03/09/22  1250   ALKPHOS 42   ALT 6   AST 10   PROT 6.7   BILITOT 0.3   LABALBU 3.2*     Recent Labs     03/09/22  1250 03/09/22  1830   WBC 7.2 7.8   RBC 3.60* 3.40*   HGB 12.1* 11.6*   HCT 36.0* 34.0*   .0* 100.0*   MCH 33.6 34.1   MCHC 33.6 34.1   RDW 14.3 14.4   * 460*   MPV 9.1 9.8     No results found for: CKTOTAL, CKMB, CKMBINDEX, TROPONINI  Recent Labs     03/09/22  1250 03/09/22  1442   TROPHS 14* 14*     No results found for: INR, PROTIME  Lab Results   Component Value Date    TSH 9.900 (H) 03/09/2022     No results found for: LABA1C  No results found for: EAG  No results found for: CHOL  No results found for: TRIG  No results found for: HDL  No results found for: LDLCALC, LDLCHOLESTEROL  No results found for: LABVLDL, VLDL  No results found for: CHOLHDLRATIO  No results for input(s): PROBNP in the last 72 hours. Cardiac Tests:      Echocardiogram reviewed:     Stress test reviewed:      Cardiac catheterization reviewed:     CXR reviewed: The ASCVD Risk score (Zayda Garcia et al., 2013) failed to calculate for the following reasons:    Cannot find a previous HDL lab    Cannot find a previous total cholesterol lab    ASSESSMENT / PLAN:    1. Atrial fibrillation with RVR (HCC)  Converted to sinus rhythm  Is currently n.p.o. and has NG tube due to small bowel obstruction  Continue on low-dose diltiazem drip for rate control until patient is able to resume p.o. and then change to p.o. rate control agent such as beta-blocker  Continue heparin drip for anticoagulation  Obtain echocardiogram to guide therapy      2. Nausea vomiting and diarrhea  Due to small bowel obstruction  Management per primary and surgery  3. SBO (small bowel obstruction) ([facility] [address]  Surgery following and currently undergoing evaluation to rule out pneumopericardium  Keep potassium at 4 magnesium at 2 and optimize electrolytes management    4. Hypokalemia  Management per primary service    5. Mild troponin leak  Continue to monitor closely and will discuss further management once acute illness is resolved            Thank you for allowing me to participate in your patient's care. Please feel free to contact me if you have any questions or concerns.     Ermelinda Pandya MD  [facility]) Cardiology

## 2022-03-11 NOTE — CARE COORDINATION
Per liaison Elysia Travis pt's niece has decided pt to discharge to SNF side prior to returning to [facility]. HENS and RADHA started; destination, ambulance form, and envelope completed in soft-chart; NPR, will need negative covid day of discharge.

## 2022-03-11 NOTE — PROGRESS NOTES
Patients chair alarm was going off, went into room patient found on floor. Stated he wanted to get up, and denies hitting his head. Assisted him back to bed no injuries noted.

## 2022-03-12 LAB
ALBUMIN SERPL-MCNC: 2.7 G/DL (ref 3.5–5.2)
ALP BLD-CCNC: 36 U/L (ref 40–129)
ALT SERPL-CCNC: 7 U/L (ref 0–40)
ANION GAP SERPL CALCULATED.3IONS-SCNC: 15 MMOL/L (ref 7–16)
APTT: 80.9 SEC (ref 24.5–35.1)
AST SERPL-CCNC: 9 U/L (ref 0–39)
BASOPHILS ABSOLUTE: 0 E9/L (ref 0–0.2)
BASOPHILS RELATIVE PERCENT: 0.7 % (ref 0–2)
BILIRUB SERPL-MCNC: 0.2 MG/DL (ref 0–1.2)
BUN BLDV-MCNC: 15 MG/DL (ref 6–23)
BURR CELLS: ABNORMAL
CALCIUM SERPL-MCNC: 8.2 MG/DL (ref 8.6–10.2)
CHLORIDE BLD-SCNC: 109 MMOL/L (ref 98–107)
CO2: 13 MMOL/L (ref 22–29)
CREAT SERPL-MCNC: 0.8 MG/DL (ref 0.7–1.2)
EOSINOPHILS ABSOLUTE: 0.33 E9/L (ref 0.05–0.5)
EOSINOPHILS RELATIVE PERCENT: 4.4 % (ref 0–6)
GFR AFRICAN AMERICAN: >60
GFR NON-AFRICAN AMERICAN: >60 ML/MIN/1.73
GLUCOSE BLD-MCNC: 87 MG/DL (ref 74–99)
HCT VFR BLD CALC: 30.3 % (ref 37–54)
HEMOGLOBIN: 9.9 G/DL (ref 12.5–16.5)
LYMPHOCYTES ABSOLUTE: 1.78 E9/L (ref 1.5–4)
LYMPHOCYTES RELATIVE PERCENT: 24.3 % (ref 20–42)
MCH RBC QN AUTO: 33.6 PG (ref 26–35)
MCHC RBC AUTO-ENTMCNC: 32.7 % (ref 32–34.5)
MCV RBC AUTO: 102.7 FL (ref 80–99.9)
MONOCYTES ABSOLUTE: 0.37 E9/L (ref 0.1–0.95)
MONOCYTES RELATIVE PERCENT: 5.2 % (ref 2–12)
NEUTROPHILS ABSOLUTE: 4.88 E9/L (ref 1.8–7.3)
NEUTROPHILS RELATIVE PERCENT: 66.1 % (ref 43–80)
PDW BLD-RTO: 14.6 FL (ref 11.5–15)
PLATELET # BLD: 425 E9/L (ref 130–450)
PMV BLD AUTO: 9.7 FL (ref 7–12)
POIKILOCYTES: ABNORMAL
POLYCHROMASIA: ABNORMAL
POTASSIUM SERPL-SCNC: 3.3 MMOL/L (ref 3.5–5)
RBC # BLD: 2.95 E12/L (ref 3.8–5.8)
SODIUM BLD-SCNC: 137 MMOL/L (ref 132–146)
TOTAL PROTEIN: 5.7 G/DL (ref 6.4–8.3)
WBC # BLD: 7.4 E9/L (ref 4.5–11.5)

## 2022-03-12 PROCEDURE — 36415 COLL VENOUS BLD VENIPUNCTURE: CPT

## 2022-03-12 PROCEDURE — 2060000000 HC ICU INTERMEDIATE R&B

## 2022-03-12 PROCEDURE — 2580000003 HC RX 258: Performed by: STUDENT IN AN ORGANIZED HEALTH CARE EDUCATION/TRAINING PROGRAM

## 2022-03-12 PROCEDURE — 85025 COMPLETE CBC W/AUTO DIFF WBC: CPT

## 2022-03-12 PROCEDURE — 6360000002 HC RX W HCPCS: Performed by: INTERNAL MEDICINE

## 2022-03-12 PROCEDURE — 2500000003 HC RX 250 WO HCPCS: Performed by: STUDENT IN AN ORGANIZED HEALTH CARE EDUCATION/TRAINING PROGRAM

## 2022-03-12 PROCEDURE — 80053 COMPREHEN METABOLIC PANEL: CPT

## 2022-03-12 PROCEDURE — 6370000000 HC RX 637 (ALT 250 FOR IP): Performed by: INTERNAL MEDICINE

## 2022-03-12 PROCEDURE — 85730 THROMBOPLASTIN TIME PARTIAL: CPT

## 2022-03-12 PROCEDURE — 99232 SBSQ HOSP IP/OBS MODERATE 35: CPT | Performed by: INTERNAL MEDICINE

## 2022-03-12 PROCEDURE — 6360000002 HC RX W HCPCS: Performed by: STUDENT IN AN ORGANIZED HEALTH CARE EDUCATION/TRAINING PROGRAM

## 2022-03-12 PROCEDURE — 93005 ELECTROCARDIOGRAM TRACING: CPT | Performed by: INTERNAL MEDICINE

## 2022-03-12 PROCEDURE — 2580000003 HC RX 258

## 2022-03-12 RX ORDER — SODIUM CHLORIDE 0.9 % (FLUSH) 0.9 %
SYRINGE (ML) INJECTION
Status: COMPLETED
Start: 2022-03-12 | End: 2022-03-12

## 2022-03-12 RX ORDER — METOPROLOL SUCCINATE 25 MG/1
25 TABLET, EXTENDED RELEASE ORAL DAILY
Status: DISCONTINUED | OUTPATIENT
Start: 2022-03-13 | End: 2022-03-13

## 2022-03-12 RX ORDER — AMOXICILLIN AND CLAVULANATE POTASSIUM 250; 62.5 MG/5ML; MG/5ML
250 POWDER, FOR SUSPENSION ORAL 2 TIMES DAILY
Qty: 70 ML | Refills: 0 | Status: SHIPPED | OUTPATIENT
Start: 2022-03-12 | End: 2022-03-12 | Stop reason: HOSPADM

## 2022-03-12 RX ADMIN — PRIMIDONE 50 MG: 50 TABLET ORAL at 09:51

## 2022-03-12 RX ADMIN — ASPIRIN 81 MG 81 MG: 81 TABLET ORAL at 09:51

## 2022-03-12 RX ADMIN — CEFTRIAXONE 1000 MG: 1 INJECTION, POWDER, FOR SOLUTION INTRAMUSCULAR; INTRAVENOUS at 18:05

## 2022-03-12 RX ADMIN — METOPROLOL SUCCINATE 12.5 MG: 25 TABLET, EXTENDED RELEASE ORAL at 09:51

## 2022-03-12 RX ADMIN — CETIRIZINE HYDROCHLORIDE 10 MG: 10 TABLET, FILM COATED ORAL at 11:07

## 2022-03-12 RX ADMIN — ENOXAPARIN SODIUM 50 MG: 100 INJECTION SUBCUTANEOUS at 13:23

## 2022-03-12 RX ADMIN — ENOXAPARIN SODIUM 50 MG: 100 INJECTION SUBCUTANEOUS at 23:12

## 2022-03-12 RX ADMIN — HEPARIN SODIUM 23 UNITS/KG/HR: 10000 INJECTION, SOLUTION INTRAVENOUS at 03:29

## 2022-03-12 RX ADMIN — TOPIRAMATE 25 MG: 25 TABLET, FILM COATED ORAL at 09:51

## 2022-03-12 RX ADMIN — LEVOTHYROXINE SODIUM 75 MCG: 0.03 TABLET ORAL at 06:50

## 2022-03-12 RX ADMIN — DOCUSATE SODIUM 100 MG: 100 CAPSULE, LIQUID FILLED ORAL at 09:51

## 2022-03-12 RX ADMIN — PRIMIDONE 50 MG: 50 TABLET ORAL at 23:12

## 2022-03-12 RX ADMIN — POTASSIUM CHLORIDE 20 MEQ: 20 TABLET, EXTENDED RELEASE ORAL at 09:51

## 2022-03-12 RX ADMIN — METRONIDAZOLE 500 MG: 500 INJECTION, SOLUTION INTRAVENOUS at 09:53

## 2022-03-12 RX ADMIN — PANTOPRAZOLE SODIUM 40 MG: 40 TABLET, DELAYED RELEASE ORAL at 09:52

## 2022-03-12 RX ADMIN — SODIUM CHLORIDE, PRESERVATIVE FREE: 5 INJECTION INTRAVENOUS at 18:33

## 2022-03-12 RX ADMIN — Medication 4000 UNITS: at 09:52

## 2022-03-12 RX ADMIN — METRONIDAZOLE 500 MG: 500 INJECTION, SOLUTION INTRAVENOUS at 03:19

## 2022-03-12 RX ADMIN — TOPIRAMATE 25 MG: 25 TABLET, FILM COATED ORAL at 23:12

## 2022-03-12 RX ADMIN — METRONIDAZOLE 500 MG: 500 INJECTION, SOLUTION INTRAVENOUS at 18:05

## 2022-03-12 ASSESSMENT — PAIN DESCRIPTION - LOCATION: LOCATION: HEAD

## 2022-03-12 ASSESSMENT — PAIN SCALES - GENERAL
PAINLEVEL_OUTOF10: 3
PAINLEVEL_OUTOF10: 0

## 2022-03-12 ASSESSMENT — PAIN DESCRIPTION - PAIN TYPE: TYPE: ACUTE PAIN

## 2022-03-12 NOTE — PROGRESS NOTES
Cardiology Progress Note:    Narrative:  · Seen resting comfortably on room air and flat  · Now in sinus rhythm with normal rate      Objective:  /76   Pulse 89   Temp 97.7 °F (36.5 °C) (Oral)   Resp 16   Wt 100 lb (45.4 kg)   SpO2 98%    General: NAD  Lungs: CTAB  Heart: RRR. No M/R/G  Abdomen: soft, NT/ND  Extremities: no pitting edema. Warm. Neuro: A&Ox3, MAEx4      Recent Labs     03/12/22  0707 03/11/22  0530 03/09/22  1830   WBC 7.4 7.9 7.8   HGB 9.9* 10.7* 11.6*   HCT 30.3* 31.9* 34.0*   .7* 100.6* 100.0*    418 460*       Lab Results   Component Value Date     03/12/2022    K 3.3 03/12/2022    K 3.3 03/09/2022     03/12/2022    CO2 13 03/12/2022    BUN 15 03/12/2022    CREATININE 0.8 03/12/2022    GLUCOSE 87 03/12/2022    CALCIUM 8.2 03/12/2022          Assessment:  1. PAF with RVR in the setting of poor oral intake, nausea and vomiting  2. Suspected small bowel obstruction being treated conservatively  3. Severe frailty, weight is 45 kg  4. Borderline myocardial injury in the setting of the above (high-sensitivity troponin 14 then 14/2 hours)        Recommendations:  · EKG today  · Discontinue aspirin  · Switch IV heparin to SC therapeutic enoxaparin. Should switch to NOAC on discharge  · No further work-up needed for the elevated troponin  · Supplement potassium and magnesium as needed  · TTE as outpatient. He is well compensated from a volume standpoint  · Continue metoprolol succinate 25 mg daily  · Follow-up with Dr. Yves Blankenship after discharge in 4 weeks        We will follow as needed.       Diana Parker MD, [address] [facility]  Interventional Cardiology/Structural Heart Disease  Office: [phone]

## 2022-03-12 NOTE — PROGRESS NOTES
GENERAL SURGERY  DAILY PROGRESS NOTE  3/12/2022    Chief Complaint   Patient presents with    Tachycardia     PT was tachy rate of 160's from SNF, sent d/t poor appetite and fluid intake. Subjective:  Pt s/e. Doing well. Resting comfortably. No acute issues overnight    Objective:  BP 98/65   Pulse 79   Temp 98.8 °F (37.1 °C) (Oral)   Resp 18   Wt 100 lb (45.4 kg)   SpO2 97%     GENERAL:  Laying in bed, no apparent distress  ABDOMEN:  Soft, non-tender, non-distended  EXTREMITIES: No edema or swelling    Assessment/Plan:  73 y.o. male abd. Pain, n/v concerning for bowel obstruction, currently being treated conservatively, CXR today showing free air under the diaphragm, however he is stable and abdominal exam is relatively reassuring.      Okay to increase diet to low fiber   Will monitor to make sure pt tolerates diet-- may need PEG   Will need7 days of abx   Follow up outpatient with Dr. Xavier Urbina     Electronically signed by Lillie Boswell DO on 3/12/2022 at 7:16 AM

## 2022-03-12 NOTE — PROGRESS NOTES
Progress Note      SUBJECTIVE: ADMITTED WITH SMALL BOWEL OBS  N/G TUBE OFF  TOLERATING CLEAR LIQUID  IS TACHYCARDIC    OBJECTIVE:      Medications    Infusion Medications    dilTIAZem Stopped (22 0800)    heparin (PORCINE) Infusion 23 Units/kg/hr (22 0329)     Scheduled Medications    metoprolol succinate  12.5 mg Oral Daily    pantoprazole  40 mg Oral Daily    primidone  50 mg Oral BID    Vitamin D  4,000 Units Oral Daily    topiramate  25 mg Oral BID    cetirizine  10 mg Oral Daily    aspirin  81 mg Oral Daily    docusate sodium  100 mg Oral BID    potassium chloride  20 mEq Oral Daily    levothyroxine  75 mcg Oral Daily    cefTRIAXone (ROCEPHIN) IV  1,000 mg IntraVENous Q24H    metroNIDAZOLE  500 mg IntraVENous Q8H    sodium chloride  250 mL IntraVENous Once       Physical  Temperature:  Current - Temp: 98.8 °F (37.1 °C);  Max - Temp  Av.8 °F (37.1 °C)  Min: 98.8 °F (37.1 °C)  Max: 98.8 °F (37.1 °C)    Respiratory Rate : Resp  Av  Min: 18  Max: 18    Pulse Range: Pulse  Av.7  Min: 79  Max: 98    Blood Presuure Range:  Systolic (09NYQ), CUK:889 , Min:96 , OND:878   ; Diastolic (44MHT), LWK:19, Min:63, Max:71      Pulse ox Range: SpO2  Av.5 %  Min: 96 %  Max: 97 %    24hr I & O:      Intake/Output Summary (Last 24 hours) at 3/12/2022 1409  Last data filed at 3/11/2022 1306  Gross per 24 hour   Intake 0 ml   Output --   Net 0 ml       Constitutional:  awake, alert, cooperative, no apparent distress, and appears stated age  Eyes:  pupils equal, round and reactive to light  ENT:  normocepalic, without obvious abnormality  NECK:  supple, symmetrical, trachea midline  HEMATOLOGIC/LYMPHATICS:  no cervical lymphadenopathy  LUNGS:  clear, no wheeze, no rhonchi  CARDIOVASCULAR:  Normal apical impulse, IRregular rate and rhythm, normal S1 and S2, no S3 or S4, and no murmur noted  ABDOMEN:  normal bowel sounds, non-distended, non-tender, no masses palpated  MUSCULOSKELETAL:  There is no redness, warmth, or swelling of the joints. Full range of motion noted. Motor strength is 5 out of 5 all extremities bilaterally. Tone is normal.  SKIN:  normal skin color, texture, turgor    Data      CBC:   Lab Results   Component Value Date    WBC 7.9 03/11/2022    RBC 3.17 03/11/2022    HGB 10.7 03/11/2022    HCT 31.9 03/11/2022    .6 03/11/2022    MCH 33.8 03/11/2022    MCHC 33.5 03/11/2022    RDW 14.2 03/11/2022     03/11/2022    MPV 9.9 03/11/2022     BMP:    Lab Results   Component Value Date     03/12/2022    K 3.3 03/12/2022    K 3.3 03/09/2022     03/12/2022    CO2 13 03/12/2022    BUN 15 03/12/2022    CREATININE 0.8 03/12/2022    CALCIUM 8.2 03/12/2022    GFRAA >60 03/12/2022    LABGLOM >60 03/12/2022    GLUCOSE 87 03/12/2022     PT/INR:  No results found for: PTINR  Last 3 Troponin:  No components found for: TROPININI      ASSESSMENT:      Principal Problem:    Small bowel obstruction (HCC)  Resolved Problems:    * No resolved hospital problems.  *  A.FIB WITH RVR--FAIRLY CONTROLLED  HYPOALBUMIN  CH ANEMIA          PLAN:    STABLE  CONT AB  IS ON B BL FOR A FIB--CONTROLLED   TSH 9.9 ON ADMISSION        Electronically signed by Jaimie oJyce MD on 3/12/2022 at 8:08 AM

## 2022-03-13 LAB
ALBUMIN SERPL-MCNC: 2.5 G/DL (ref 3.5–5.2)
ALP BLD-CCNC: 35 U/L (ref 40–129)
ALT SERPL-CCNC: 5 U/L (ref 0–40)
ANION GAP SERPL CALCULATED.3IONS-SCNC: 11 MMOL/L (ref 7–16)
ANISOCYTOSIS: ABNORMAL
APTT: 35.1 SEC (ref 24.5–35.1)
AST SERPL-CCNC: 9 U/L (ref 0–39)
BASOPHILS ABSOLUTE: 0.1 E9/L (ref 0–0.2)
BASOPHILS RELATIVE PERCENT: 1.8 % (ref 0–2)
BILIRUB SERPL-MCNC: <0.2 MG/DL (ref 0–1.2)
BUN BLDV-MCNC: 11 MG/DL (ref 6–23)
BURR CELLS: ABNORMAL
CALCIUM SERPL-MCNC: 7.8 MG/DL (ref 8.6–10.2)
CHLORIDE BLD-SCNC: 110 MMOL/L (ref 98–107)
CO2: 17 MMOL/L (ref 22–29)
CREAT SERPL-MCNC: 0.8 MG/DL (ref 0.7–1.2)
EKG ATRIAL RATE: 85 BPM
EKG P AXIS: 53 DEGREES
EKG P-R INTERVAL: 200 MS
EKG Q-T INTERVAL: 402 MS
EKG QRS DURATION: 84 MS
EKG QTC CALCULATION (BAZETT): 478 MS
EKG R AXIS: 69 DEGREES
EKG T AXIS: 12 DEGREES
EKG VENTRICULAR RATE: 85 BPM
EOSINOPHILS ABSOLUTE: 0.34 E9/L (ref 0.05–0.5)
EOSINOPHILS RELATIVE PERCENT: 6.1 % (ref 0–6)
GFR AFRICAN AMERICAN: >60
GFR NON-AFRICAN AMERICAN: >60 ML/MIN/1.73
GLUCOSE BLD-MCNC: 92 MG/DL (ref 74–99)
HCT VFR BLD CALC: 27.7 % (ref 37–54)
HEMOGLOBIN: 9.3 G/DL (ref 12.5–16.5)
LYMPHOCYTES ABSOLUTE: 1.4 E9/L (ref 1.5–4)
LYMPHOCYTES RELATIVE PERCENT: 25.2 % (ref 20–42)
MCH RBC QN AUTO: 33.8 PG (ref 26–35)
MCHC RBC AUTO-ENTMCNC: 33.6 % (ref 32–34.5)
MCV RBC AUTO: 100.7 FL (ref 80–99.9)
MONOCYTES ABSOLUTE: 0.56 E9/L (ref 0.1–0.95)
MONOCYTES RELATIVE PERCENT: 10.4 % (ref 2–12)
NEUTROPHILS ABSOLUTE: 3.19 E9/L (ref 1.8–7.3)
NEUTROPHILS RELATIVE PERCENT: 56.5 % (ref 43–80)
PDW BLD-RTO: 14.5 FL (ref 11.5–15)
PLATELET # BLD: 418 E9/L (ref 130–450)
PMV BLD AUTO: 9.4 FL (ref 7–12)
POIKILOCYTES: ABNORMAL
POLYCHROMASIA: ABNORMAL
POTASSIUM SERPL-SCNC: 3.4 MMOL/L (ref 3.5–5)
RBC # BLD: 2.75 E12/L (ref 3.8–5.8)
SODIUM BLD-SCNC: 138 MMOL/L (ref 132–146)
TOTAL PROTEIN: 5.4 G/DL (ref 6.4–8.3)
WBC # BLD: 5.6 E9/L (ref 4.5–11.5)

## 2022-03-13 PROCEDURE — 2060000000 HC ICU INTERMEDIATE R&B

## 2022-03-13 PROCEDURE — 80053 COMPREHEN METABOLIC PANEL: CPT

## 2022-03-13 PROCEDURE — 6360000002 HC RX W HCPCS: Performed by: STUDENT IN AN ORGANIZED HEALTH CARE EDUCATION/TRAINING PROGRAM

## 2022-03-13 PROCEDURE — 85025 COMPLETE CBC W/AUTO DIFF WBC: CPT

## 2022-03-13 PROCEDURE — 6370000000 HC RX 637 (ALT 250 FOR IP): Performed by: INTERNAL MEDICINE

## 2022-03-13 PROCEDURE — 85730 THROMBOPLASTIN TIME PARTIAL: CPT

## 2022-03-13 PROCEDURE — 2500000003 HC RX 250 WO HCPCS: Performed by: STUDENT IN AN ORGANIZED HEALTH CARE EDUCATION/TRAINING PROGRAM

## 2022-03-13 PROCEDURE — 2580000003 HC RX 258: Performed by: STUDENT IN AN ORGANIZED HEALTH CARE EDUCATION/TRAINING PROGRAM

## 2022-03-13 PROCEDURE — 36415 COLL VENOUS BLD VENIPUNCTURE: CPT

## 2022-03-13 PROCEDURE — 6360000002 HC RX W HCPCS: Performed by: INTERNAL MEDICINE

## 2022-03-13 RX ORDER — POTASSIUM CHLORIDE 20 MEQ/1
20 TABLET, EXTENDED RELEASE ORAL 2 TIMES DAILY WITH MEALS
Status: DISCONTINUED | OUTPATIENT
Start: 2022-03-13 | End: 2022-03-15 | Stop reason: HOSPADM

## 2022-03-13 RX ORDER — DABIGATRAN ETEXILATE 150 MG/1
150 CAPSULE, COATED PELLETS ORAL 2 TIMES DAILY
Status: DISCONTINUED | OUTPATIENT
Start: 2022-03-13 | End: 2022-03-15 | Stop reason: HOSPADM

## 2022-03-13 RX ORDER — METOPROLOL SUCCINATE 25 MG/1
25 TABLET, EXTENDED RELEASE ORAL EVERY 12 HOURS
Status: DISCONTINUED | OUTPATIENT
Start: 2022-03-13 | End: 2022-03-15 | Stop reason: HOSPADM

## 2022-03-13 RX ADMIN — METRONIDAZOLE 500 MG: 500 INJECTION, SOLUTION INTRAVENOUS at 17:34

## 2022-03-13 RX ADMIN — TOPIRAMATE 25 MG: 25 TABLET, FILM COATED ORAL at 20:19

## 2022-03-13 RX ADMIN — METOPROLOL SUCCINATE 25 MG: 25 TABLET, EXTENDED RELEASE ORAL at 10:05

## 2022-03-13 RX ADMIN — METRONIDAZOLE 500 MG: 500 INJECTION, SOLUTION INTRAVENOUS at 03:17

## 2022-03-13 RX ADMIN — LEVOTHYROXINE SODIUM 75 MCG: 0.03 TABLET ORAL at 05:38

## 2022-03-13 RX ADMIN — DABIGATRAN ETEXILATE MESYLATE 150 MG: 150 CAPSULE ORAL at 20:19

## 2022-03-13 RX ADMIN — METOPROLOL SUCCINATE 25 MG: 25 TABLET, EXTENDED RELEASE ORAL at 20:19

## 2022-03-13 RX ADMIN — DOCUSATE SODIUM 100 MG: 100 CAPSULE, LIQUID FILLED ORAL at 20:19

## 2022-03-13 RX ADMIN — METRONIDAZOLE 500 MG: 500 INJECTION, SOLUTION INTRAVENOUS at 10:05

## 2022-03-13 RX ADMIN — TOPIRAMATE 25 MG: 25 TABLET, FILM COATED ORAL at 10:02

## 2022-03-13 RX ADMIN — CETIRIZINE HYDROCHLORIDE 10 MG: 10 TABLET, FILM COATED ORAL at 10:02

## 2022-03-13 RX ADMIN — PRIMIDONE 50 MG: 50 TABLET ORAL at 20:19

## 2022-03-13 RX ADMIN — ENOXAPARIN SODIUM 50 MG: 100 INJECTION SUBCUTANEOUS at 10:01

## 2022-03-13 RX ADMIN — CEFTRIAXONE 1000 MG: 1 INJECTION, POWDER, FOR SOLUTION INTRAMUSCULAR; INTRAVENOUS at 17:34

## 2022-03-13 RX ADMIN — PANTOPRAZOLE SODIUM 40 MG: 40 TABLET, DELAYED RELEASE ORAL at 10:01

## 2022-03-13 RX ADMIN — POTASSIUM CHLORIDE 20 MEQ: 20 TABLET, EXTENDED RELEASE ORAL at 17:34

## 2022-03-13 RX ADMIN — Medication 4000 UNITS: at 10:01

## 2022-03-13 RX ADMIN — PRIMIDONE 50 MG: 50 TABLET ORAL at 10:02

## 2022-03-13 RX ADMIN — DOCUSATE SODIUM 100 MG: 100 CAPSULE, LIQUID FILLED ORAL at 10:02

## 2022-03-13 ASSESSMENT — PAIN SCALES - GENERAL
PAINLEVEL_OUTOF10: 0
PAINLEVEL_OUTOF10: 0

## 2022-03-13 NOTE — PLAN OF CARE
Chart reviewed. Discontinued therapeutic enoxaparin and started dabigatran 150 mg p.o. twice daily (there is a significant interaction between primidone and rivaroxaban/apixaban). Rest per my note from yesterday. We will arrange follow-up. We will sign off.   Please call with any questions    Atiya Carrizales MD

## 2022-03-13 NOTE — PROGRESS NOTES
Progress Note      SUBJECTIVE:  UNEVENTFUL NITE  TOLERATING PO/NO ABDO PAIN  TACHYCARDIC  ONGOING IV FLUID  HB 9.3/K 3.4    OBJECTIVE:      Medications    Infusion Medications   Scheduled Medications    metoprolol succinate  25 mg Oral Daily    enoxaparin  1 mg/kg SubCUTAneous BID    pantoprazole  40 mg Oral Daily    primidone  50 mg Oral BID    Vitamin D  4,000 Units Oral Daily    topiramate  25 mg Oral BID    cetirizine  10 mg Oral Daily    docusate sodium  100 mg Oral BID    potassium chloride  20 mEq Oral Daily    levothyroxine  75 mcg Oral Daily    cefTRIAXone (ROCEPHIN) IV  1,000 mg IntraVENous Q24H    metroNIDAZOLE  500 mg IntraVENous Q8H    sodium chloride  250 mL IntraVENous Once       Physical  Temperature:  Current - Temp: 99 °F (37.2 °C); Max - Temp  Av.2 °F (36.8 °C)  Min: 97.7 °F (36.5 °C)  Max: 99 °F (37.2 °C)    Respiratory Rate : Resp  Av  Min: 16  Max: 20    Pulse Range: Pulse  Av.3  Min: 81  Max: 92    Blood Presuure Range:  Systolic (02QVW), NZL:547 , Min:124 , JIR:149   ; Diastolic (10GTK), POT:32, Min:58, Max:76      Pulse ox Range: SpO2  Av.3 %  Min: 98 %  Max: 100 %    24hr I & O:      Intake/Output Summary (Last 24 hours) at 3/13/2022 0847  Last data filed at 3/13/2022 8179  Gross per 24 hour   Intake 870 ml   Output --   Net 870 ml       Constitutional:  awake, alert, cooperative, no apparent distress, and appears stated age  Eyes:  pupils equal, round and reactive to light  ENT:  normocepalic, without obvious abnormality  NECK:  supple, symmetrical, trachea midline  HEMATOLOGIC/LYMPHATICS:  no cervical lymphadenopathy  LUNGS:  clear, no wheeze, no rhonchi  CARDIOVASCULAR:  Normal apical impulse, IRREG  ABDOMEN:  normal bowel sounds, non-distended, non-tender, no masses palpated  MUSCULOSKELETAL:  There is no redness, warmth, or swelling of the joints. Full range of motion noted. Motor strength is 5 out of 5 all extremities bilaterally.   Tone is normal.  SKIN:  normal skin color, texture, turgor    Data      CBC:   Lab Results   Component Value Date    WBC 5.6 03/13/2022    RBC 2.75 03/13/2022    HGB 9.3 03/13/2022    HCT 27.7 03/13/2022    .7 03/13/2022    MCH 33.8 03/13/2022    MCHC 33.6 03/13/2022    RDW 14.5 03/13/2022     03/13/2022    MPV 9.4 03/13/2022     BMP:    Lab Results   Component Value Date     03/13/2022    K 3.4 03/13/2022    K 3.3 03/09/2022     03/13/2022    CO2 17 03/13/2022    BUN 11 03/13/2022    CREATININE 0.8 03/13/2022    CALCIUM 7.8 03/13/2022    GFRAA >60 03/13/2022    LABGLOM >60 03/13/2022    GLUCOSE 92 03/13/2022     PT/INR:  No results found for: PTINR  Last 3 Troponin:  No components found for: TROPININI      ASSESSMENT:      Principal Problem:    Small bowel obstruction (HCC)  Resolved Problems:    * No resolved hospital problems. *  A. FIB-  HYPOKAL  gen weakness           PLAN:  INC LOPRESSOR AND KCL  TO BID   CONT IV FLUID          Electronically signed by Suzanne Sinha MD on 3/13/2022 at 8:47 AM

## 2022-03-13 NOTE — PROGRESS NOTES
GENERAL SURGERY  DAILY PROGRESS NOTE  3/13/2022    Chief Complaint   Patient presents with    Tachycardia     PT was tachy rate of 160's from SNF, sent d/t poor appetite and fluid intake. Subjective:  Pt s/e. Doing well. Most alter pt has been during hospital stay. Having Bm tolerating diet     Objective:  BP (!) 126/58   Pulse 81   Temp 99 °F (37.2 °C) (Temporal)   Resp 20   Wt 100 lb (45.4 kg)   SpO2 100%     GENERAL:  Laying in bed, no apparent distress, more awake   ABDOMEN:  Soft, non-tender, non-distended  EXTREMITIES: No edema or swelling    Assessment/Plan:  73 y.o. male abd. Pain, n/v concerning for bowel obstruction, currently being treated conservatively, CXR today showing free air under the diaphragm, however he is stable and abdominal exam is relatively reassuring. Okay to increase diet to low fiber-- appears to have good PO intake  Pain control per primary   Will need 7 days of abx   Follow up outpatient with Dr. Ulisses Franz     Electronically signed by Lg Oliveira DO on 3/13/2022 at 6:47 AM     The patient was seen and examined and the chart was reviewed. Overall, the patient is doing well. The patient denies abdominal pain. Agree with completion of a course of antibiotics and low fiber diet. Follow-up as an outpatient.

## 2022-03-13 NOTE — PLAN OF CARE
Problem: Tissue Perfusion - Cardiopulmonary, Altered:  Goal: Absence of angina  Description: Absence of angina  3/13/2022 1019 by Lv Sanders RN  Outcome: Ongoing  3/12/2022 2337 by Deanna Manuel RN  Outcome: Met This Shift  3/12/2022 2330 by Chuck Echevarria RN  Outcome: Met This Shift  Goal: Hemodynamic stability will improve  Description: Hemodynamic stability will improve  3/13/2022 1019 by Lv Sanders RN  Outcome: Ongoing  3/12/2022 2337 by Deanna Manuel RN  Outcome: Met This Shift  3/12/2022 2330 by Chuck Echevarria RN  Outcome: Met This Shift     Problem: Skin Integrity:  Goal: Will show no infection signs and symptoms  Description: Will show no infection signs and symptoms  3/13/2022 1019 by Lv Sanders RN  Outcome: Ongoing  3/12/2022 2337 by Deanna Manuel RN  Outcome: Met This Shift  3/12/2022 2330 by Chuck Echevarria RN  Outcome: Met This Shift  Goal: Absence of new skin breakdown  Description: Absence of new skin breakdown  3/13/2022 1019 by Lv Sanders RN  Outcome: Ongoing  3/12/2022 2337 by Deanna Manuel RN  Outcome: Met This Shift  3/12/2022 2330 by Chuck Echevarria RN  Outcome: Met This Shift     Problem: Falls - Risk of:  Goal: Will remain free from falls  Description: Will remain free from falls  3/13/2022 1019 by Lv Sanders RN  Outcome: Ongoing  3/12/2022 2337 by Deanna Manuel RN  Outcome: Met This Shift  3/12/2022 2330 by Chuck Echevarria RN  Outcome: Met This Shift  Goal: Absence of physical injury  Description: Absence of physical injury  3/13/2022 1019 by Lv Sanders RN  Outcome: Ongoing  3/12/2022 2337 by Deanna Manuel RN  Outcome: Met This Shift  3/12/2022 2330 by Chuck Echevarria RN  Outcome: Met This Shift     Problem:  Activity:  Goal: Risk for activity intolerance will decrease  Description: Risk for activity intolerance will decrease  3/13/2022 1019 by Lv Sanders RN  Outcome: Ongoing  3/12/2022 2337 by Deanna Manuel RN  Outcome: Met This Shift  3/12/2022 2330 by Rudy Dinh RN  Outcome: Met This Shift     Problem:  Bowel/Gastric:  Goal: Bowel function will improve  Description: Bowel function will improve  3/13/2022 1019 by Jossy Graham RN  Outcome: Ongoing  3/12/2022 2337 by Mj Gonsalez RN  Outcome: Met This Shift  3/12/2022 2330 by Rudy Dinh RN  Outcome: Met This Shift  Goal: Diagnostic test results will improve  Description: Diagnostic test results will improve  3/13/2022 1019 by Jossy Graham RN  Outcome: Ongoing  3/12/2022 2337 by Mj Gonsalez RN  Outcome: Met This Shift  3/12/2022 2330 by Rudy Dinh RN  Outcome: Met This Shift  Goal: Occurrences of nausea will decrease  Description: Occurrences of nausea will decrease  3/13/2022 1019 by Jossy Graham RN  Outcome: Ongoing  3/12/2022 2337 by Mj Gonsalez RN  Outcome: Met This Shift  3/12/2022 2330 by Rudy Dinh RN  Outcome: Met This Shift  Goal: Occurrences of vomiting will decrease  Description: Occurrences of vomiting will decrease  3/13/2022 1019 by Jossy Graham RN  Outcome: Ongoing  3/12/2022 2337 by Mj Gonsalez RN  Outcome: Met This Shift  3/12/2022 2330 by Rudy Dinh RN  Outcome: Met This Shift     Problem: Fluid Volume:  Goal: Maintenance of adequate hydration will improve  Description: Maintenance of adequate hydration will improve  3/13/2022 1019 by Jossy Graham RN  Outcome: Ongoing  3/12/2022 2337 by Mj Gonsalez RN  Outcome: Met This Shift  3/12/2022 2330 by Rudy Dinh RN  Outcome: Met This Shift     Problem: Health Behavior:  Goal: Ability to state signs and symptoms to report to health care provider will improve  Description: Ability to state signs and symptoms to report to health care provider will improve  3/13/2022 1019 by Jossy Graham RN  Outcome: Ongoing  3/12/2022 2337 by Mj Gonsalez RN  Outcome: Met This Shift  3/12/2022 2330 by Rudy Dinh RN  Outcome: Met This Shift     Problem: Physical

## 2022-03-13 NOTE — PLAN OF CARE
Problem: Falls - Risk of:  Goal: Will remain free from falls  Description: Will remain free from falls  3/12/2022 2337 by Deanna Manuel RN  Outcome: Met This Shift  3/12/2022 2330 by Chuck Echevarria RN  Outcome: Met This Shift  Goal: Absence of physical injury  Description: Absence of physical injury  3/12/2022 2337 by Deanna Manuel RN  Outcome: Met This Shift  3/12/2022 2330 by Chuck Echevarria RN  Outcome: Met This Shift     Problem: Skin Integrity:  Goal: Will show no infection signs and symptoms  Description: Will show no infection signs and symptoms  3/12/2022 2337 by Deanna Manuel RN  Outcome: Met This Shift  3/12/2022 2330 by Chuck Echevarria RN  Outcome: Met This Shift  Goal: Absence of new skin breakdown  Description: Absence of new skin breakdown  3/12/2022 2337 by Deanna Manuel RN  Outcome: Met This Shift  3/12/2022 2330 by Chuck Echevarria RN  Outcome: Met This Shift     Problem: Tissue Perfusion - Cardiopulmonary, Altered:  Goal: Absence of angina  Description: Absence of angina  3/12/2022 2337 by Deanna Manuel RN  Outcome: Met This Shift  3/12/2022 2330 by Chuck Echevarria RN  Outcome: Met This Shift  Goal: Hemodynamic stability will improve  Description: Hemodynamic stability will improve  3/12/2022 2337 by Deanna Manuel RN  Outcome: Met This Shift  3/12/2022 2330 by Chuck Echevarria RN  Outcome: Met This Shift     Problem:  Bowel/Gastric:  Goal: Bowel function will improve  Description: Bowel function will improve  3/12/2022 2337 by Deanna Manuel RN  Outcome: Met This Shift  3/12/2022 2330 by Chuck Echevarria RN  Outcome: Met This Shift  Goal: Diagnostic test results will improve  Description: Diagnostic test results will improve  3/12/2022 2337 by Deanna Manuel RN  Outcome: Met This Shift  3/12/2022 2330 by hCuck Echevarria RN  Outcome: Met This Shift  Goal: Occurrences of nausea will decrease  Description: Occurrences of nausea will decrease  3/12/2022 2337 by Deanna Manuel RN  Outcome: Met This Shift  3/12/2022 2330 by Osvaldo Ventura RN  Outcome: Met This Shift  Goal: Occurrences of vomiting will decrease  Description: Occurrences of vomiting will decrease  3/12/2022 2337 by Pattie Diaz RN  Outcome: Met This Shift  3/12/2022 2330 by Osvaldo Ventura RN  Outcome: Met This Shift     Problem:  Activity:  Goal: Risk for activity intolerance will decrease  Description: Risk for activity intolerance will decrease  3/12/2022 2337 by Pattie Diaz RN  Outcome: Met This Shift  3/12/2022 2330 by Osvaldo Venutra RN  Outcome: Met This Shift     Problem: Physical Regulation:  Goal: Complications related to the disease process, condition or treatment will be avoided or minimized  Description: Complications related to the disease process, condition or treatment will be avoided or minimized  3/12/2022 2337 by Pattie Diaz RN  Outcome: Met This Shift  3/12/2022 2330 by Osvaldo Ventura RN  Outcome: Met This Shift  Goal: Ability to maintain clinical measurements within normal limits will improve  Description: Ability to maintain clinical measurements within normal limits will improve  3/12/2022 2337 by Pattie Diaz RN  Outcome: Met This Shift  3/12/2022 2330 by Osvaldo Ventura RN  Outcome: Met This Shift     Problem: Sensory:  Goal: Ability to identify factors that increase the pain will improve  Description: Ability to identify factors that increase the pain will improve  3/12/2022 2337 by Pattie Diaz RN  Outcome: Met This Shift  3/12/2022 2330 by Osvaldo Ventura RN  Outcome: Met This Shift  Goal: Ability to notify healthcare provider of pain before it becomes unmanageable or unbearable will improve  Description: Ability to notify healthcare provider of pain before it becomes unmanageable or unbearable will improve  3/12/2022 2337 by Pattie Diaz RN  Outcome: Met This Shift  3/12/2022 2330 by Osvaldo Ventura RN  Outcome: Met This Shift  Goal: Pain level will decrease  Description: Pain level will decrease  3/12/2022 2337 by Savanna Johnson, RN  Outcome: Met This Shift  3/12/2022 2330 by Emilie Rucker RN  Outcome: Met This Shift

## 2022-03-14 ENCOUNTER — TELEPHONE (OUTPATIENT)
Dept: CARDIOLOGY CLINIC | Age: 72
End: 2022-03-14

## 2022-03-14 LAB
ALBUMIN SERPL-MCNC: 2.7 G/DL (ref 3.5–5.2)
ALP BLD-CCNC: 34 U/L (ref 40–129)
ALT SERPL-CCNC: 7 U/L (ref 0–40)
ANION GAP SERPL CALCULATED.3IONS-SCNC: 10 MMOL/L (ref 7–16)
ANISOCYTOSIS: ABNORMAL
APTT: 40.6 SEC (ref 24.5–35.1)
AST SERPL-CCNC: 12 U/L (ref 0–39)
BASOPHILS ABSOLUTE: 0.03 E9/L (ref 0–0.2)
BASOPHILS RELATIVE PERCENT: 0.6 % (ref 0–2)
BILIRUB SERPL-MCNC: <0.2 MG/DL (ref 0–1.2)
BUN BLDV-MCNC: 8 MG/DL (ref 6–23)
BURR CELLS: ABNORMAL
CALCIUM SERPL-MCNC: 8.5 MG/DL (ref 8.6–10.2)
CHLORIDE BLD-SCNC: 111 MMOL/L (ref 98–107)
CO2: 18 MMOL/L (ref 22–29)
CREAT SERPL-MCNC: 0.8 MG/DL (ref 0.7–1.2)
EOSINOPHILS ABSOLUTE: 0.31 E9/L (ref 0.05–0.5)
EOSINOPHILS RELATIVE PERCENT: 6.5 % (ref 0–6)
GFR AFRICAN AMERICAN: >60
GFR NON-AFRICAN AMERICAN: >60 ML/MIN/1.73
GLUCOSE BLD-MCNC: 84 MG/DL (ref 74–99)
HCT VFR BLD CALC: 31.1 % (ref 37–54)
HEMOGLOBIN: 10.1 G/DL (ref 12.5–16.5)
IMMATURE GRANULOCYTES #: 0.05 E9/L
IMMATURE GRANULOCYTES %: 1.1 % (ref 0–5)
LYMPHOCYTES ABSOLUTE: 1.77 E9/L (ref 1.5–4)
LYMPHOCYTES RELATIVE PERCENT: 37.3 % (ref 20–42)
MCH RBC QN AUTO: 33.2 PG (ref 26–35)
MCHC RBC AUTO-ENTMCNC: 32.5 % (ref 32–34.5)
MCV RBC AUTO: 102.3 FL (ref 80–99.9)
MONOCYTES ABSOLUTE: 0.39 E9/L (ref 0.1–0.95)
MONOCYTES RELATIVE PERCENT: 8.2 % (ref 2–12)
NEUTROPHILS ABSOLUTE: 2.19 E9/L (ref 1.8–7.3)
NEUTROPHILS RELATIVE PERCENT: 46.3 % (ref 43–80)
OVALOCYTES: ABNORMAL
PDW BLD-RTO: 14.4 FL (ref 11.5–15)
PLATELET # BLD: 467 E9/L (ref 130–450)
PMV BLD AUTO: 9.6 FL (ref 7–12)
POIKILOCYTES: ABNORMAL
POLYCHROMASIA: ABNORMAL
POTASSIUM SERPL-SCNC: 4 MMOL/L (ref 3.5–5)
RBC # BLD: 3.04 E12/L (ref 3.8–5.8)
SMUDGE CELLS: ABNORMAL
SODIUM BLD-SCNC: 139 MMOL/L (ref 132–146)
SPHEROCYTES: ABNORMAL
TOTAL PROTEIN: 5.6 G/DL (ref 6.4–8.3)
WBC # BLD: 4.7 E9/L (ref 4.5–11.5)

## 2022-03-14 PROCEDURE — 6370000000 HC RX 637 (ALT 250 FOR IP): Performed by: INTERNAL MEDICINE

## 2022-03-14 PROCEDURE — 2060000000 HC ICU INTERMEDIATE R&B

## 2022-03-14 PROCEDURE — 6360000002 HC RX W HCPCS: Performed by: STUDENT IN AN ORGANIZED HEALTH CARE EDUCATION/TRAINING PROGRAM

## 2022-03-14 PROCEDURE — 85730 THROMBOPLASTIN TIME PARTIAL: CPT

## 2022-03-14 PROCEDURE — 80053 COMPREHEN METABOLIC PANEL: CPT

## 2022-03-14 PROCEDURE — 85025 COMPLETE CBC W/AUTO DIFF WBC: CPT

## 2022-03-14 PROCEDURE — 36415 COLL VENOUS BLD VENIPUNCTURE: CPT

## 2022-03-14 PROCEDURE — 2580000003 HC RX 258: Performed by: STUDENT IN AN ORGANIZED HEALTH CARE EDUCATION/TRAINING PROGRAM

## 2022-03-14 PROCEDURE — 2500000003 HC RX 250 WO HCPCS: Performed by: STUDENT IN AN ORGANIZED HEALTH CARE EDUCATION/TRAINING PROGRAM

## 2022-03-14 RX ORDER — LORATADINE 10 MG/1
10 TABLET ORAL DAILY
COMMUNITY
Start: 2022-02-08

## 2022-03-14 RX ORDER — ASPIRIN 81 MG/1
81 TABLET, COATED ORAL DAILY
COMMUNITY
Start: 2022-02-08

## 2022-03-14 RX ORDER — CITALOPRAM 10 MG/1
10 TABLET ORAL DAILY
Status: ON HOLD | COMMUNITY
Start: 2022-02-08 | End: 2022-03-14

## 2022-03-14 RX ORDER — POTASSIUM CHLORIDE 20 MEQ/1
20 TABLET, EXTENDED RELEASE ORAL DAILY
COMMUNITY

## 2022-03-14 RX ORDER — POTASSIUM CHLORIDE 750 MG/1
20 TABLET, EXTENDED RELEASE ORAL DAILY
Status: ON HOLD | COMMUNITY
Start: 2022-02-15 | End: 2022-03-14

## 2022-03-14 RX ORDER — PRIMIDONE 50 MG/1
100 TABLET ORAL NIGHTLY
COMMUNITY

## 2022-03-14 RX ORDER — LEVOTHYROXINE SODIUM 88 UG/1
88 TABLET ORAL DAILY
Status: ON HOLD | COMMUNITY
Start: 2022-02-15 | End: 2022-03-15 | Stop reason: HOSPADM

## 2022-03-14 RX ORDER — ROSUVASTATIN CALCIUM 20 MG/1
20 TABLET, COATED ORAL DAILY
Status: ON HOLD | COMMUNITY
Start: 2022-02-08 | End: 2022-03-14

## 2022-03-14 RX ORDER — MIRTAZAPINE 30 MG/1
30 TABLET, FILM COATED ORAL NIGHTLY
COMMUNITY
Start: 2022-03-08

## 2022-03-14 RX ADMIN — DOCUSATE SODIUM 100 MG: 100 CAPSULE, LIQUID FILLED ORAL at 19:55

## 2022-03-14 RX ADMIN — PRIMIDONE 50 MG: 50 TABLET ORAL at 08:47

## 2022-03-14 RX ADMIN — CEFTRIAXONE 1000 MG: 1 INJECTION, POWDER, FOR SOLUTION INTRAMUSCULAR; INTRAVENOUS at 17:03

## 2022-03-14 RX ADMIN — POTASSIUM CHLORIDE 20 MEQ: 20 TABLET, EXTENDED RELEASE ORAL at 08:48

## 2022-03-14 RX ADMIN — METOPROLOL SUCCINATE 25 MG: 25 TABLET, EXTENDED RELEASE ORAL at 19:55

## 2022-03-14 RX ADMIN — PANTOPRAZOLE SODIUM 40 MG: 40 TABLET, DELAYED RELEASE ORAL at 08:48

## 2022-03-14 RX ADMIN — METOPROLOL SUCCINATE 25 MG: 25 TABLET, EXTENDED RELEASE ORAL at 08:48

## 2022-03-14 RX ADMIN — METRONIDAZOLE 500 MG: 500 INJECTION, SOLUTION INTRAVENOUS at 17:05

## 2022-03-14 RX ADMIN — DABIGATRAN ETEXILATE MESYLATE 150 MG: 150 CAPSULE ORAL at 08:48

## 2022-03-14 RX ADMIN — TOPIRAMATE 25 MG: 25 TABLET, FILM COATED ORAL at 08:48

## 2022-03-14 RX ADMIN — PRIMIDONE 50 MG: 50 TABLET ORAL at 19:55

## 2022-03-14 RX ADMIN — METRONIDAZOLE 500 MG: 500 INJECTION, SOLUTION INTRAVENOUS at 10:18

## 2022-03-14 RX ADMIN — CETIRIZINE HYDROCHLORIDE 10 MG: 10 TABLET, FILM COATED ORAL at 08:47

## 2022-03-14 RX ADMIN — DOCUSATE SODIUM 100 MG: 100 CAPSULE, LIQUID FILLED ORAL at 08:47

## 2022-03-14 RX ADMIN — POTASSIUM CHLORIDE 20 MEQ: 20 TABLET, EXTENDED RELEASE ORAL at 17:03

## 2022-03-14 RX ADMIN — LEVOTHYROXINE SODIUM 75 MCG: 0.03 TABLET ORAL at 05:14

## 2022-03-14 RX ADMIN — Medication 4000 UNITS: at 08:47

## 2022-03-14 RX ADMIN — METRONIDAZOLE 500 MG: 500 INJECTION, SOLUTION INTRAVENOUS at 02:32

## 2022-03-14 RX ADMIN — DABIGATRAN ETEXILATE MESYLATE 150 MG: 150 CAPSULE ORAL at 19:57

## 2022-03-14 ASSESSMENT — PAIN SCALES - GENERAL: PAINLEVEL_OUTOF10: 0

## 2022-03-14 NOTE — TELEPHONE ENCOUNTER
Follow-up in 4 to 12 weeks. Follow-up sooner if there are symptoms. If symptoms are significant to go to the emergency room.

## 2022-03-14 NOTE — PROGRESS NOTES
Pt family member [facility] requesting for an update from physician.  Phone number is listed in the chart

## 2022-03-14 NOTE — PLAN OF CARE
Problem: Falls - Risk of:  Goal: Will remain free from falls  Description: Will remain free from falls  Outcome: Met This Shift     Problem: Falls - Risk of:  Goal: Absence of physical injury  Description: Absence of physical injury  Outcome: Met This Shift     Problem: Skin Integrity:  Goal: Will show no infection signs and symptoms  Description: Will show no infection signs and symptoms  Outcome: Met This Shift     Problem: Skin Integrity:  Goal: Absence of new skin breakdown  Description: Absence of new skin breakdown  Outcome: Met This Shift     Problem: Tissue Perfusion - Cardiopulmonary, Altered:  Goal: Absence of angina  Description: Absence of angina  Outcome: Met This Shift     Problem: Tissue Perfusion - Cardiopulmonary, Altered:  Goal: Hemodynamic stability will improve  Description: Hemodynamic stability will improve  Outcome: Met This Shift     Problem: Bowel/Gastric:  Goal: Bowel function will improve  Description: Bowel function will improve  Outcome: Met This Shift     Problem: Bowel/Gastric:  Goal: Occurrences of nausea will decrease  Description: Occurrences of nausea will decrease  Outcome: Met This Shift     Problem:  Bowel/Gastric:  Goal: Occurrences of vomiting will decrease  Description: Occurrences of vomiting will decrease  Outcome: Met This Shift

## 2022-03-14 NOTE — PLAN OF CARE
Problem: Tissue Perfusion - Cardiopulmonary, Altered:  Goal: Absence of angina  Description: Absence of angina  3/14/2022 0407 by Celine Kowalski RN  Outcome: Met This Shift  Goal: Hemodynamic stability will improve  Description: Hemodynamic stability will improve  3/14/2022 0407 by Celine Kowalski RN  Outcome: Met This Shift

## 2022-03-14 NOTE — PROGRESS NOTES
Admit Date: 3/9/2022    Subjective:     Week end event reviewed  Awake weak     Objective:     No data found. I/O last 3 completed shifts: In: 280 [P.O.:180; IV Piggyback:100]  Out: -   No intake/output data recorded. HEENT: Normal  NECK: Thyroid normal. No carotid bruit. No lymphphadenopathy. CVS: RRR  RS: Clear. No wheeze. No rhonchi. ABD: Soft. Non tender. No mass. Normal BS. EXT: No edema. Non tender. Pulses present.    NEURO:moving all extremities       Scheduled Meds:   potassium chloride  20 mEq Oral BID WC    metoprolol succinate  25 mg Oral Q12H    dabigatran  150 mg Oral BID    pantoprazole  40 mg Oral Daily    primidone  50 mg Oral BID    Vitamin D  4,000 Units Oral Daily    topiramate  25 mg Oral BID    cetirizine  10 mg Oral Daily    docusate sodium  100 mg Oral BID    levothyroxine  75 mcg Oral Daily    cefTRIAXone (ROCEPHIN) IV  1,000 mg IntraVENous Q24H    metroNIDAZOLE  500 mg IntraVENous Q8H    sodium chloride  250 mL IntraVENous Once     Continuous Infusions:    CBC with Differential:    Lab Results   Component Value Date    WBC 5.6 03/13/2022    RBC 2.75 03/13/2022    HGB 9.3 03/13/2022    HCT 27.7 03/13/2022     03/13/2022    .7 03/13/2022    MCH 33.8 03/13/2022    MCHC 33.6 03/13/2022    RDW 14.5 03/13/2022    LYMPHOPCT 25.2 03/13/2022    MONOPCT 10.4 03/13/2022    BASOPCT 1.8 03/13/2022    MONOSABS 0.56 03/13/2022    LYMPHSABS 1.40 03/13/2022    EOSABS 0.34 03/13/2022    BASOSABS 0.10 03/13/2022     CMP:    Lab Results   Component Value Date     03/13/2022    K 3.4 03/13/2022    K 3.3 03/09/2022     03/13/2022    CO2 17 03/13/2022    BUN 11 03/13/2022    CREATININE 0.8 03/13/2022    GFRAA >60 03/13/2022    LABGLOM >60 03/13/2022    PROT 5.4 03/13/2022    LABALBU 2.5 03/13/2022    CALCIUM 7.8 03/13/2022    BILITOT <0.2 03/13/2022    ALKPHOS 35 03/13/2022    AST 9 03/13/2022    ALT 5 03/13/2022     PT/INR:  No results found for: PROTIME, INR    Assessment:     Principal Problem: A. Fib RVR   SBO  Cognitive impairment       Plan:   Continue same ,as per surgery ,discharge planing

## 2022-03-15 VITALS
HEART RATE: 69 BPM | WEIGHT: 100 LBS | SYSTOLIC BLOOD PRESSURE: 110 MMHG | TEMPERATURE: 97.9 F | DIASTOLIC BLOOD PRESSURE: 62 MMHG | OXYGEN SATURATION: 98 % | RESPIRATION RATE: 18 BRPM

## 2022-03-15 LAB
ALBUMIN SERPL-MCNC: 2.9 G/DL (ref 3.5–5.2)
ALP BLD-CCNC: 33 U/L (ref 40–129)
ALT SERPL-CCNC: 10 U/L (ref 0–40)
ANION GAP SERPL CALCULATED.3IONS-SCNC: 12 MMOL/L (ref 7–16)
ANISOCYTOSIS: ABNORMAL
AST SERPL-CCNC: 20 U/L (ref 0–39)
BASOPHILS ABSOLUTE: 0 E9/L (ref 0–0.2)
BASOPHILS RELATIVE PERCENT: 0.4 % (ref 0–2)
BILIRUB SERPL-MCNC: <0.2 MG/DL (ref 0–1.2)
BUN BLDV-MCNC: 7 MG/DL (ref 6–23)
CALCIUM SERPL-MCNC: 8.6 MG/DL (ref 8.6–10.2)
CHLORIDE BLD-SCNC: 107 MMOL/L (ref 98–107)
CO2: 17 MMOL/L (ref 22–29)
CREAT SERPL-MCNC: 0.9 MG/DL (ref 0.7–1.2)
EOSINOPHILS ABSOLUTE: 0.32 E9/L (ref 0.05–0.5)
EOSINOPHILS RELATIVE PERCENT: 7 % (ref 0–6)
GFR AFRICAN AMERICAN: >60
GFR NON-AFRICAN AMERICAN: >60 ML/MIN/1.73
GLUCOSE BLD-MCNC: 86 MG/DL (ref 74–99)
HCT VFR BLD CALC: 33.6 % (ref 37–54)
HEMOGLOBIN: 10.9 G/DL (ref 12.5–16.5)
LYMPHOCYTES ABSOLUTE: 1.01 E9/L (ref 1.5–4)
LYMPHOCYTES RELATIVE PERCENT: 21.7 % (ref 20–42)
MCH RBC QN AUTO: 33.6 PG (ref 26–35)
MCHC RBC AUTO-ENTMCNC: 32.4 % (ref 32–34.5)
MCV RBC AUTO: 103.7 FL (ref 80–99.9)
MONOCYTES ABSOLUTE: 0.32 E9/L (ref 0.1–0.95)
MONOCYTES RELATIVE PERCENT: 7 % (ref 2–12)
NEUTROPHILS ABSOLUTE: 2.94 E9/L (ref 1.8–7.3)
NEUTROPHILS RELATIVE PERCENT: 64.3 % (ref 43–80)
OVALOCYTES: ABNORMAL
PDW BLD-RTO: 14.2 FL (ref 11.5–15)
PLATELET # BLD: 489 E9/L (ref 130–450)
PMV BLD AUTO: 9.3 FL (ref 7–12)
POIKILOCYTES: ABNORMAL
POLYCHROMASIA: ABNORMAL
POTASSIUM SERPL-SCNC: 4.6 MMOL/L (ref 3.5–5)
RBC # BLD: 3.24 E12/L (ref 3.8–5.8)
SARS-COV-2, NAAT: NOT DETECTED
SODIUM BLD-SCNC: 136 MMOL/L (ref 132–146)
TOTAL PROTEIN: 6.3 G/DL (ref 6.4–8.3)
WBC # BLD: 4.6 E9/L (ref 4.5–11.5)

## 2022-03-15 PROCEDURE — 36415 COLL VENOUS BLD VENIPUNCTURE: CPT

## 2022-03-15 PROCEDURE — 6370000000 HC RX 637 (ALT 250 FOR IP): Performed by: INTERNAL MEDICINE

## 2022-03-15 PROCEDURE — 85025 COMPLETE CBC W/AUTO DIFF WBC: CPT

## 2022-03-15 PROCEDURE — 2500000003 HC RX 250 WO HCPCS: Performed by: STUDENT IN AN ORGANIZED HEALTH CARE EDUCATION/TRAINING PROGRAM

## 2022-03-15 PROCEDURE — 87635 SARS-COV-2 COVID-19 AMP PRB: CPT

## 2022-03-15 PROCEDURE — 2580000003 HC RX 258: Performed by: INTERNAL MEDICINE

## 2022-03-15 PROCEDURE — 80053 COMPREHEN METABOLIC PANEL: CPT

## 2022-03-15 RX ORDER — SODIUM CHLORIDE 0.9 % (FLUSH) 0.9 %
5-40 SYRINGE (ML) INJECTION 2 TIMES DAILY
Status: DISCONTINUED | OUTPATIENT
Start: 2022-03-15 | End: 2022-03-15

## 2022-03-15 RX ORDER — CIPROFLOXACIN 500 MG/1
500 TABLET, FILM COATED ORAL EVERY 12 HOURS SCHEDULED
Qty: 14 TABLET | Refills: 0 | DISCHARGE
Start: 2022-03-15 | End: 2022-03-22

## 2022-03-15 RX ORDER — METRONIDAZOLE 500 MG/1
500 TABLET ORAL EVERY 8 HOURS
Qty: 21 TABLET | Refills: 0 | DISCHARGE
Start: 2022-03-15 | End: 2022-03-22

## 2022-03-15 RX ORDER — METOPROLOL SUCCINATE 25 MG/1
25 TABLET, EXTENDED RELEASE ORAL EVERY 12 HOURS
Qty: 30 TABLET | Refills: 3 | DISCHARGE
Start: 2022-03-15

## 2022-03-15 RX ORDER — SODIUM CHLORIDE 0.9 % (FLUSH) 0.9 %
5-40 SYRINGE (ML) INJECTION PRN
Status: DISCONTINUED | OUTPATIENT
Start: 2022-03-15 | End: 2022-03-15

## 2022-03-15 RX ORDER — DABIGATRAN ETEXILATE 150 MG/1
150 CAPSULE, COATED PELLETS ORAL 2 TIMES DAILY
Qty: 60 CAPSULE | Refills: 3 | DISCHARGE
Start: 2022-03-15

## 2022-03-15 RX ORDER — CIPROFLOXACIN 500 MG/1
500 TABLET, FILM COATED ORAL EVERY 12 HOURS SCHEDULED
Status: DISCONTINUED | OUTPATIENT
Start: 2022-03-15 | End: 2022-03-15 | Stop reason: HOSPADM

## 2022-03-15 RX ORDER — CHOLECALCIFEROL (VITAMIN D3) 50 MCG
4000 TABLET ORAL DAILY
Qty: 60 TABLET | Refills: 3 | DISCHARGE
Start: 2022-03-16

## 2022-03-15 RX ORDER — LEVOTHYROXINE SODIUM 0.07 MG/1
75 TABLET ORAL DAILY
Qty: 30 TABLET | Refills: 3 | DISCHARGE
Start: 2022-03-16

## 2022-03-15 RX ORDER — METRONIDAZOLE 500 MG/1
500 TABLET ORAL EVERY 8 HOURS
Status: DISCONTINUED | OUTPATIENT
Start: 2022-03-15 | End: 2022-03-15 | Stop reason: HOSPADM

## 2022-03-15 RX ADMIN — METOPROLOL SUCCINATE 25 MG: 25 TABLET, EXTENDED RELEASE ORAL at 08:19

## 2022-03-15 RX ADMIN — CIPROFLOXACIN 500 MG: 500 TABLET, FILM COATED ORAL at 08:25

## 2022-03-15 RX ADMIN — PRIMIDONE 50 MG: 50 TABLET ORAL at 08:19

## 2022-03-15 RX ADMIN — Medication 4000 UNITS: at 08:18

## 2022-03-15 RX ADMIN — DOCUSATE SODIUM 100 MG: 100 CAPSULE, LIQUID FILLED ORAL at 08:19

## 2022-03-15 RX ADMIN — CETIRIZINE HYDROCHLORIDE 10 MG: 10 TABLET, FILM COATED ORAL at 08:19

## 2022-03-15 RX ADMIN — PANTOPRAZOLE SODIUM 40 MG: 40 TABLET, DELAYED RELEASE ORAL at 08:19

## 2022-03-15 RX ADMIN — METRONIDAZOLE 500 MG: 500 TABLET ORAL at 17:17

## 2022-03-15 RX ADMIN — DABIGATRAN ETEXILATE MESYLATE 150 MG: 150 CAPSULE ORAL at 08:19

## 2022-03-15 RX ADMIN — LEVOTHYROXINE SODIUM 75 MCG: 0.03 TABLET ORAL at 05:40

## 2022-03-15 RX ADMIN — SODIUM CHLORIDE, PRESERVATIVE FREE 10 ML: 5 INJECTION INTRAVENOUS at 08:18

## 2022-03-15 RX ADMIN — POTASSIUM CHLORIDE 20 MEQ: 20 TABLET, EXTENDED RELEASE ORAL at 17:17

## 2022-03-15 RX ADMIN — TOPIRAMATE 25 MG: 25 TABLET, FILM COATED ORAL at 08:18

## 2022-03-15 RX ADMIN — POTASSIUM CHLORIDE 20 MEQ: 20 TABLET, EXTENDED RELEASE ORAL at 08:18

## 2022-03-15 RX ADMIN — METRONIDAZOLE 500 MG: 500 TABLET ORAL at 08:18

## 2022-03-15 RX ADMIN — METRONIDAZOLE 500 MG: 500 INJECTION, SOLUTION INTRAVENOUS at 01:23

## 2022-03-15 ASSESSMENT — PAIN SCALES - GENERAL: PAINLEVEL_OUTOF10: 0

## 2022-03-15 NOTE — CARE COORDINATION
Per attending pt can discharge if ok with surgery, perfect served surgery, they are in [address], will get back to this LSW. Envelope and ambulance form in soft chart. HENS started will need completed once discharge written. Sara Mullins MSW, LSW

## 2022-03-15 NOTE — PLAN OF CARE
Problem: Tissue Perfusion - Cardiopulmonary, Altered:  Goal: Absence of angina  Description: Absence of angina  Outcome: Met This Shift  Goal: Hemodynamic stability will improve  Description: Hemodynamic stability will improve  Outcome: Met This Shift     Problem: Skin Integrity:  Goal: Will show no infection signs and symptoms  Description: Will show no infection signs and symptoms  3/15/2022 0018 by Sunita Richter RN  Outcome: Met This Shift  3/14/2022 1228 by Blanca Monteiro RN  Outcome: Ongoing  Goal: Absence of new skin breakdown  Description: Absence of new skin breakdown  3/15/2022 0018 by Sunita Richter RN  Outcome: Met This Shift  3/14/2022 1228 by Blanca Monteiro RN  Outcome: Ongoing     Problem: Falls - Risk of:  Goal: Will remain free from falls  Description: Will remain free from falls  3/15/2022 0018 by Sunita Richter RN  Outcome: Met This Shift  3/14/2022 1228 by Blanca Monteiro RN  Outcome: Ongoing  Goal: Absence of physical injury  Description: Absence of physical injury  3/15/2022 0018 by Sunita Richter RN  Outcome: Met This Shift  3/14/2022 1228 by Blanca Monteiro RN  Outcome: Ongoing     Problem: Activity:  Goal: Risk for activity intolerance will decrease  Description: Risk for activity intolerance will decrease  Outcome: Met This Shift     Problem:  Bowel/Gastric:  Goal: Bowel function will improve  Description: Bowel function will improve  3/15/2022 0018 by Sunita Richter RN  Outcome: Met This Shift  3/14/2022 1228 by Blanca Monteiro RN  Outcome: Ongoing  Goal: Diagnostic test results will improve  Description: Diagnostic test results will improve  3/15/2022 0018 by Sunita Richter RN  Outcome: Met This Shift  3/14/2022 1228 by Blanca Monteiro RN  Outcome: Ongoing  Goal: Occurrences of nausea will decrease  Description: Occurrences of nausea will decrease  3/15/2022 0018 by Sunita Richter RN  Outcome: Met This Shift  3/14/2022 1228 by Blanca Monteiro RN  Outcome: Ongoing  Goal: Occurrences of vomiting will decrease  Description: Occurrences of vomiting will decrease  3/15/2022 0018 by Marlene Puckett RN  Outcome: Met This Shift  3/14/2022 1228 by Tayler Steel RN  Outcome: Ongoing     Problem: Fluid Volume:  Goal: Maintenance of adequate hydration will improve  Description: Maintenance of adequate hydration will improve  Outcome: Met This Shift     Problem: Health Behavior:  Goal: Ability to state signs and symptoms to report to health care provider will improve  Description: Ability to state signs and symptoms to report to health care provider will improve  3/15/2022 0018 by Marlene Puckett RN  Outcome: Met This Shift  3/14/2022 1228 by Tayler Steel RN  Outcome: Ongoing     Problem: Physical Regulation:  Goal: Complications related to the disease process, condition or treatment will be avoided or minimized  Description: Complications related to the disease process, condition or treatment will be avoided or minimized  Outcome: Met This Shift  Goal: Ability to maintain clinical measurements within normal limits will improve  Description: Ability to maintain clinical measurements within normal limits will improve  Outcome: Met This Shift     Problem: Sensory:  Goal: Ability to identify factors that increase the pain will improve  Description: Ability to identify factors that increase the pain will improve  Outcome: Met This Shift  Goal: Ability to notify healthcare provider of pain before it becomes unmanageable or unbearable will improve  Description: Ability to notify healthcare provider of pain before it becomes unmanageable or unbearable will improve  Outcome: Met This Shift  Goal: Pain level will decrease  Description: Pain level will decrease  Outcome: Met This Shift

## 2022-03-15 NOTE — PROGRESS NOTES
Admit Date: 3/9/2022    Subjective:     Awake NAD poor oral intake     Objective:     Patient Vitals for the past 8 hrs:   BP Temp Temp src Pulse Resp SpO2   03/15/22 0115 (!) 102/56 98.1 °F (36.7 °C) Infrared 64 18 98 %     I/O last 3 completed shifts: In: 5 [P.O.:420]  Out: -   No intake/output data recorded. HEENT: Normal  NECK: Thyroid normal. No carotid bruit. No lymphphadenopathy. CVS: IRR rate controlled   RS: Clear. No wheeze. No rhonchi. ABD: Soft. Non tender. No mass. Normal BS. EXT: No edema. Non tender. Pulses present.    NEURO: moving all extremities       Scheduled Meds:   sodium chloride flush  5-40 mL IntraVENous BID    potassium chloride  20 mEq Oral BID WC    metoprolol succinate  25 mg Oral Q12H    dabigatran  150 mg Oral BID    pantoprazole  40 mg Oral Daily    primidone  50 mg Oral BID    Vitamin D  4,000 Units Oral Daily    topiramate  25 mg Oral BID    cetirizine  10 mg Oral Daily    docusate sodium  100 mg Oral BID    levothyroxine  75 mcg Oral Daily    cefTRIAXone (ROCEPHIN) IV  1,000 mg IntraVENous Q24H    metroNIDAZOLE  500 mg IntraVENous Q8H    sodium chloride  250 mL IntraVENous Once     Continuous Infusions:    CBC with Differential:    Lab Results   Component Value Date    WBC 4.7 03/14/2022    RBC 3.04 03/14/2022    HGB 10.1 03/14/2022    HCT 31.1 03/14/2022     03/14/2022    .3 03/14/2022    MCH 33.2 03/14/2022    MCHC 32.5 03/14/2022    RDW 14.4 03/14/2022    LYMPHOPCT 37.3 03/14/2022    MONOPCT 8.2 03/14/2022    BASOPCT 0.6 03/14/2022    MONOSABS 0.39 03/14/2022    LYMPHSABS 1.77 03/14/2022    EOSABS 0.31 03/14/2022    BASOSABS 0.03 03/14/2022     CMP:    Lab Results   Component Value Date     03/14/2022    K 4.0 03/14/2022    K 3.3 03/09/2022     03/14/2022    CO2 18 03/14/2022    BUN 8 03/14/2022    CREATININE 0.8 03/14/2022    GFRAA >60 03/14/2022    LABGLOM >60 03/14/2022    PROT 5.6 03/14/2022    LABALBU 2.7 03/14/2022 CALCIUM 8.5 03/14/2022    BILITOT <0.2 03/14/2022    ALKPHOS 34 03/14/2022    AST 12 03/14/2022    ALT 7 03/14/2022     PT/INR:  No results found for: PROTIME, INR    Assessment:     Principal Problem: A. Fib RVR   SBO resolved   Cognitive impairment       Plan:   Stable to switch to oral ATB ,discharge when cleared by surgery

## 2022-03-15 NOTE — PLAN OF CARE
Problem: Tissue Perfusion - Cardiopulmonary, Altered:  Goal: Absence of angina  Description: Absence of angina  3/15/2022 1120 by Any Billingsley RN  Outcome: Ongoing  3/15/2022 0018 by Dennise Glass RN  Outcome: Met This Shift  Goal: Hemodynamic stability will improve  Description: Hemodynamic stability will improve  3/15/2022 1120 by Any Billingsley RN  Outcome: Ongoing  3/15/2022 0018 by Dennise Glass RN  Outcome: Met This Shift     Problem: Skin Integrity:  Goal: Will show no infection signs and symptoms  Description: Will show no infection signs and symptoms  3/15/2022 1120 by Any Billingsley RN  Outcome: Ongoing  3/15/2022 0018 by Dennise Glass RN  Outcome: Met This Shift  Goal: Absence of new skin breakdown  Description: Absence of new skin breakdown  3/15/2022 1120 by Any Billingsley RN  Outcome: Ongoing  3/15/2022 0018 by Dennise Glass RN  Outcome: Met This Shift     Problem: Falls - Risk of:  Goal: Will remain free from falls  Description: Will remain free from falls  3/15/2022 1120 by Any Billingsley RN  Outcome: Ongoing  3/15/2022 0018 by Dennise Glass RN  Outcome: Met This Shift  Goal: Absence of physical injury  Description: Absence of physical injury  3/15/2022 1120 by Any Billingsley RN  Outcome: Ongoing  3/15/2022 0018 by Dennise Glass RN  Outcome: Met This Shift     Problem: Activity:  Goal: Risk for activity intolerance will decrease  Description: Risk for activity intolerance will decrease  3/15/2022 1120 by Any Billingsley RN  Outcome: Ongoing  3/15/2022 0018 by Dennise Glass RN  Outcome: Met This Shift     Problem:  Bowel/Gastric:  Goal: Bowel function will improve  Description: Bowel function will improve  3/15/2022 1120 by Any Billingsley RN  Outcome: Ongoing  3/15/2022 0018 by Dennise Glass RN  Outcome: Met This Shift  Goal: Diagnostic test results will improve  Description: Diagnostic test results will improve  3/15/2022 1120 by Any Billingsley RN  Outcome: Ongoing  3/15/2022 0018 by Elvia Couch RN  Outcome: Met This Shift  Goal: Occurrences of nausea will decrease  Description: Occurrences of nausea will decrease  3/15/2022 1120 by Hai Serra RN  Outcome: Ongoing  3/15/2022 0018 by Elvia Couch RN  Outcome: Met This Shift  Goal: Occurrences of vomiting will decrease  Description: Occurrences of vomiting will decrease  3/15/2022 1120 by Hai Serra RN  Outcome: Ongoing  3/15/2022 0018 by Elvia Couch RN  Outcome: Met This Shift     Problem: Fluid Volume:  Goal: Maintenance of adequate hydration will improve  Description: Maintenance of adequate hydration will improve  3/15/2022 1120 by Hai Serra RN  Outcome: Ongoing  3/15/2022 0018 by Elvia Couch RN  Outcome: Met This Shift     Problem: Health Behavior:  Goal: Ability to state signs and symptoms to report to health care provider will improve  Description: Ability to state signs and symptoms to report to health care provider will improve  3/15/2022 1120 by Hai Serra RN  Outcome: Ongoing  3/15/2022 0018 by Elvia Couch RN  Outcome: Met This Shift     Problem: Physical Regulation:  Goal: Complications related to the disease process, condition or treatment will be avoided or minimized  Description: Complications related to the disease process, condition or treatment will be avoided or minimized  3/15/2022 1120 by Hai Serra RN  Outcome: Ongoing  3/15/2022 0018 by Elvia Couch RN  Outcome: Met This Shift  Goal: Ability to maintain clinical measurements within normal limits will improve  Description: Ability to maintain clinical measurements within normal limits will improve  3/15/2022 1120 by Hai Serra RN  Outcome: Ongoing  3/15/2022 0018 by Elvia Couch RN  Outcome: Met This Shift     Problem: Sensory:  Goal: Ability to identify factors that increase the pain will improve  Description: Ability to identify factors that increase the pain will improve  3/15/2022 1120 by Aleisha Jernigan RN  Outcome: Ongoing  3/15/2022 0018 by Tremaine Díaz RN  Outcome: Met This Shift  Goal: Ability to notify healthcare provider of pain before it becomes unmanageable or unbearable will improve  Description: Ability to notify healthcare provider of pain before it becomes unmanageable or unbearable will improve  3/15/2022 1120 by Aleisha Jernigan RN  Outcome: Ongoing  3/15/2022 0018 by Tremaine Díaz RN  Outcome: Met This Shift  Goal: Pain level will decrease  Description: Pain level will decrease  3/15/2022 1120 by Aleisha Jernigan RN  Outcome: Ongoing  3/15/2022 0018 by Tremaine Díaz RN  Outcome: Met This Shift

## 2022-03-16 NOTE — DISCHARGE SUMMARY
Discharge Summary    Date: 3/16/2022  Patient Name: Natalia Billingsley YOB: 1950 Age: 73 y.o. Admit Date: 3/9/2022  Discharge Date: 3/15/2022  Discharge Condition: [address]    Admission Diagnosis  Hypokalemia (E87.6);SBO (small bowel obstruction) ([facility] [address] (K56.609); Atrial fibrillation with RVR (HCC) (I48.91); Nausea vomiting and diarrhea (R11.2, R19.7); Small bowel obstruction ([address] (K56.609)     Discharge Diagnosis  Principal Problem: Small bowel obstruction (HCC)Resolved Problems: * No resolved hospital problems. [facility] Stay  Narrative of Hospital Course:  Admitted from ER with abdominal pain nausea emesis was tachycardic found with partial SBO ,A.fib. RVR seen by surgery as well as cardiology NG. Inserted treated conservatively improved obstruction resolved tolerated diet stabilized transferred to [facility]     Consultants:  [address] [place] [address] CONSULT TO CARDIOLOGYIP CONSULT TO GENERAL SURGERY    Surgeries/procedures Performed:       Treatments:           Discharge Plan/Disposition:  To [facility]    Hospital/Incidental Findings Requiring Follow Up:    Patient Instructions:    Diet: Regular Diet    Activity:Activity as Tolerated  For number of days (if applicable): Other Instructions:    Provider Follow-Up:   No follow-ups on file. Significant Diagnostic Studies:    Recent Labs:  Admission on 03/09/2022, Discharged on 03/15/2022No results displayed because visit has over 200 results. ------------    Radiology last 7 days:  CT ABDOMEN PELVIS WO CONTRAST Additional Contrast? NoneResult Date: 3/10/78985. No evidence of pneumoperitoneum. 2. Interval placement of an NG tube when compared to the previous study performed 1 day earlier. 3.  Minimal improvement in some of the dilated loops of fluid-filled small bowel. 4.  No other significant interval change, as described above.  CT ABDOMEN PELVIS W IV CONTRAST Additional Contrast? NoneResult Date: 3/9/2022Small bowel obstruction although the exact etiology and point of obstruction is not clearly delineated on this examination. T10 and T11 compression fractures which are probably chronic. Mild diffuse urinary bladder wall thickening may be due to chronic outlet obstruction. Small left inguinal fat and fluid containing hernia. Large hiatal hernia. Diverticulosis but no evidence of diverticulitis. XR CHEST PORTABLEResult Date: 3/10/2022Enteric tube terminates in the distal stomach. XR CHEST PORTABLEResult Date: 3/9/89687. Satisfactory position of the NG tube within the stomach XR CHEST PORTABLEResult Date: 3/9/2022No radiographic evidence of acute cardiopulmonary disease. Small hiatal hernia. XR ABDOMEN FOR NG/OG/NE TUBE PLACEMENTResult Date: 3/10/12413. The NG tube appears to be curved upward within a hiatal hernia. The tip does not extend below the level of the diaphragm. XR CHEST ABDOMEN NG PLACEMENTResult Date: 3/10/2022Pneumoperitoneum. This is new when compared to the previous examination. Bowel perforation is suspected. Enteric tube positioned as described. XR CHEST ABDOMEN NG PLACEMENTResult Date: 3/9/2022Enteric tube tip terminates in the distal 3rd of the esophagus and should be advanced at least 15.0 cm. Findings given to the core team on 03/09/2022 11:48 p.m. to be given to the license caregiver.       [unfilled]    Discharge Medications    Discharge Medication List as of 3/15/2022  4:22 PMSTART taking these medicationsdabigatran (PRADAXA) 150 MG capsuleTake 1 capsule by mouth 2 times daily, Disp-60 capsule, R-3DC to SNFmetoprolol succinate (TOPROL XL) 25 MG extended release tabletTake 1 tablet by mouth every 12 hours, Disp-30 tablet, R-3DC to SNFciprofloxacin (CIPRO) 500 MG tabletTake 1 tablet by mouth every 12 hours for 7 days, Disp-14 tablet, R-0DC to SNFmetroNIDAZOLE (FLAGYL) 500 MG tabletTake 1 tablet by mouth every 8 hours for 7 days, Disp-21 tablet, R-0DC to SNFVitamin D (CHOLECALCIFEROL) 50 MCG (2000 UT) TABS tabletTake 2 Stopping:    Time Spent on Discharge:3E] minutes were spent in patient examination, evaluation, counseling as well as medication reconciliation, prescriptions for required medications, discharge plan, and follow up.     Electronically signed by Rebekah Bush MD on 3/16/22 at 7:02 AM EDT

## 2022-05-28 LAB
SARS-COV-2: NOT DETECTED
SOURCE: NORMAL

## 2022-06-25 LAB
SARS-COV-2: NOT DETECTED
SOURCE: NORMAL

## 2022-06-27 LAB — SOURCE: NORMAL

## 2022-06-30 LAB — SARS-COV-2, PCR: NOT DETECTED

## 2022-07-07 LAB
SARS-COV-2: NOT DETECTED
SOURCE: NORMAL

## 2022-07-13 LAB
SARS-COV-2: NOT DETECTED
SOURCE: NORMAL

## 2022-07-16 LAB
SARS-COV-2: NOT DETECTED
SOURCE: NORMAL

## 2022-09-29 LAB
SARS-COV-2: NOT DETECTED
SOURCE: NORMAL

## 2022-10-05 LAB
SARS-COV-2: NOT DETECTED
SOURCE: NORMAL

## 2022-10-18 ENCOUNTER — OFFICE VISIT (OUTPATIENT)
Dept: CARDIOLOGY CLINIC | Age: 72
End: 2022-10-18
Payer: MEDICARE

## 2022-10-18 VITALS
HEART RATE: 63 BPM | BODY MASS INDEX: 18.87 KG/M2 | SYSTOLIC BLOOD PRESSURE: 100 MMHG | HEIGHT: 60 IN | RESPIRATION RATE: 16 BRPM | DIASTOLIC BLOOD PRESSURE: 76 MMHG | WEIGHT: 96.1 LBS

## 2022-10-18 DIAGNOSIS — E03.9 ACQUIRED HYPOTHYROIDISM: ICD-10-CM

## 2022-10-18 DIAGNOSIS — R04.0 NASAL BLEEDING: ICD-10-CM

## 2022-10-18 DIAGNOSIS — R77.8 TROPONIN I ABOVE REFERENCE RANGE: ICD-10-CM

## 2022-10-18 DIAGNOSIS — R10.30 LOWER ABDOMINAL PAIN: ICD-10-CM

## 2022-10-18 DIAGNOSIS — I48.0 PAROXYSMAL ATRIAL FIBRILLATION (HCC): Primary | ICD-10-CM

## 2022-10-18 DIAGNOSIS — K56.609 SMALL BOWEL OBSTRUCTION (HCC): ICD-10-CM

## 2022-10-18 PROBLEM — R79.89 TROPONIN I ABOVE REFERENCE RANGE: Status: ACTIVE | Noted: 2022-10-18

## 2022-10-18 PROBLEM — R06.02 SOB (SHORTNESS OF BREATH): Status: ACTIVE | Noted: 2022-10-18

## 2022-10-18 PROCEDURE — 1123F ACP DISCUSS/DSCN MKR DOCD: CPT | Performed by: INTERNAL MEDICINE

## 2022-10-18 PROCEDURE — 93000 ELECTROCARDIOGRAM COMPLETE: CPT | Performed by: INTERNAL MEDICINE

## 2022-10-18 PROCEDURE — 99214 OFFICE O/P EST MOD 30 MIN: CPT | Performed by: INTERNAL MEDICINE

## 2022-10-18 PROCEDURE — G8420 CALC BMI NORM PARAMETERS: HCPCS | Performed by: INTERNAL MEDICINE

## 2022-10-18 PROCEDURE — G8427 DOCREV CUR MEDS BY ELIG CLIN: HCPCS | Performed by: INTERNAL MEDICINE

## 2022-10-18 PROCEDURE — 3017F COLORECTAL CA SCREEN DOC REV: CPT | Performed by: INTERNAL MEDICINE

## 2022-10-18 PROCEDURE — 1036F TOBACCO NON-USER: CPT | Performed by: INTERNAL MEDICINE

## 2022-10-18 PROCEDURE — G8484 FLU IMMUNIZE NO ADMIN: HCPCS | Performed by: INTERNAL MEDICINE

## 2022-10-18 RX ORDER — BISACODYL 10 MG
10 SUPPOSITORY, RECTAL RECTAL EVERY 24 HOURS
COMMUNITY

## 2022-10-18 RX ORDER — OMEPRAZOLE 20 MG/1
20 CAPSULE, DELAYED RELEASE ORAL DAILY
COMMUNITY

## 2022-10-18 RX ORDER — M-VIT,TX,IRON,MINS/CALC/FOLIC 27MG-0.4MG
1 TABLET ORAL DAILY
COMMUNITY

## 2022-10-18 RX ORDER — ACETAMINOPHEN 325 MG/1
650 TABLET ORAL EVERY 4 HOURS PRN
COMMUNITY

## 2022-10-18 NOTE — PROGRESS NOTES
Out Patient CARDIOLOGY Follow Up Visit    Name: Andrews Choi    Age: 81 y.o. Date of Admission: No admission date for patient encounter. Date of Service: 10/18/2022    Reason for Consultation:   Chief Complaint   Patient presents with    Heart Problem     Office Visit- Patient has no complaints. Referring Physician: No admitting provider for patient encounter. History of Present Illness: 74-year-old male with history of mentally challenged/cognitive impairment, hypothyroidism, hyperlipidemia and prior history of nosebleeds and gait instability who was recently hospitalized for evaluation of nausea vomiting and found to have partial small bowel obstruction. He was also noted to have new onset atrial fibrillation and a mild troponin leak. She was conservatively managed from the partial small bowel obstruction. He present for follow-up visit today with her daughter and overall patient report doing well and daughter confirms patient has been doing well and denies any symptoms. He is taking his beta-blocker and is in sinus rhythm. He is on Pradaxa for [facility] and will discuss with pt and daughter during next visit to see is they would like to continue [facility] given reported prior nose bleed. Patient and daughter are not interested in an ischemic evaluation given lack of symptoms.   He will follow-up in 3 months but is advised to call for earlier visit if symptoms are currently verbalized understanding             PAF with RVR in the setting of poor oral intake, nausea and vomiting  Suspected small bowel obstruction being treated conservatively  Severe frailty, weight is 45 kg  Borderline myocardial injury in the setting of the above (high-sensitivity troponin 14 then 14/2 hours)               Past Medical History:  Past Medical History:   Diagnosis Date    A-fib ([facility] [address]     Cognitive impairment     Cystoma     Heart failure ([facility] [address]     Hyperlipidemia     Hypothyroid        Past Surgical History:  Past Surgical History:   Procedure Laterality Date    CHOLECYSTECTOMY         Family History:  Family History   Family history unknown: Yes       Social History:  Social History     Socioeconomic History    Marital status: Single     Spouse name: Not on file    Number of children: Not on file    Years of education: Not on file    Highest education level: Not on file   Occupational History    Not on file   Tobacco Use    Smoking status: Never    Smokeless tobacco: Never   Vaping Use    Vaping Use: Never used   Substance and Sexual Activity    Alcohol use: Not Currently    Drug use: Never    Sexual activity: Not on file   Other Topics Concern    Not on file   Social History Narrative    Not on file     Social Determinants of Health     Financial Resource Strain: Not on file   Food Insecurity: Not on file   Transportation Needs: Not on file   Physical Activity: Not on file   Stress: Not on file   Social Connections: Not on file   Intimate Partner Violence: Not on file   Housing Stability: Not on file       Allergies:  No Known Allergies    Home Medications:  Prior to Admission medications    Medication Sig Start Date End Date Taking?  Authorizing Provider   bisacodyl (DULCOLAX) 10 MG suppository Place 10 mg rectally every 24 hours   Yes Historical Provider, MD   magnesium hydroxide (MILK OF MAGNESIA) 400 MG/5ML suspension Take by mouth daily as needed for Constipation   Yes Historical Provider, MD   Multiple Vitamins-Minerals (THERAPEUTIC MULTIVITAMIN-MINERALS) tablet Take 1 tablet by mouth daily   Yes Historical Provider, MD   omeprazole (PRILOSEC) 20 MG delayed release capsule Take 20 mg by mouth daily   Yes Historical Provider, MD   acetaminophen (TYLENOL) 325 MG tablet Take 650 mg by mouth every 4 hours as needed for Pain   Yes Historical Provider, MD   dabigatran (PRADAXA) 150 MG capsule Take 1 capsule by mouth 2 times daily 3/15/22  Yes Paige Alatorre MD   metoprolol succinate (TOPROL XL) 25 MG extended release tablet Take 1 tablet by mouth every 12 hours  Patient taking differently: Take 25 mg by mouth in the morning and at bedtime 3/15/22  Yes Hans Black MD   levothyroxine (SYNTHROID) 75 MCG tablet Take 1 tablet by mouth Daily  Patient taking differently: Take 100 mcg by mouth Daily 3/16/22  Yes Hans Black MD   loratadine (CLARITIN) 10 MG tablet Take 10 mg by mouth daily 2/8/22  Yes Historical Provider, MD   ASPIRIN LOW DOSE 81 MG EC tablet Take 81 mg by mouth daily 2/8/22  Yes Historical Provider, MD   mirtazapine (REMERON) 30 MG tablet Take 30 mg by mouth at bedtime 3/8/22  Yes Historical Provider, MD   primidone (MYSOLINE) 50 MG tablet Take 100 mg by mouth nightly   Yes Historical Provider, MD   potassium chloride (KLOR-CON M) 20 MEQ extended release tablet Take 20 mEq by mouth daily   Yes Historical Provider, MD   docusate sodium (COLACE) 100 MG capsule Take 100 mg by mouth daily    Yes Historical Provider, MD   Probiotic Product (PROBIOTIC-10 PO) Take one 5 Billion cell capsule daily with breakfast   Yes Historical Provider, MD   topiramate (TOPAMAX) 25 MG tablet Take 25 mg by mouth 2 times daily   Yes Historical Provider, MD   Vitamin D (CHOLECALCIFEROL) 50 MCG (2000 UT) TABS tablet Take 2 tablets by mouth daily  Patient not taking: Reported on 10/18/2022 3/16/22   Hans Black MD   megestrol (MEGACE) 40 MG/ML suspension Take 800 mg by mouth daily   Patient not taking: Reported on 10/18/2022    Historical Provider, MD   pantoprazole (PROTONIX) 40 MG tablet Take 40 mg by mouth daily  Patient not taking: Reported on 10/18/2022    Historical Provider, MD   primidone (MYSOLINE) 50 MG tablet Take 50 mg by mouth every morning   Patient not taking: Reported on 10/18/2022    Historical Provider, MD       Current Medications:  Current Outpatient Medications   Medication Sig Dispense Refill    bisacodyl (DULCOLAX) 10 MG suppository Place 10 mg rectally every 24 hours      magnesium hydroxide (MILK OF MAGNESIA) 400 MG/5ML suspension Take by mouth daily as needed for Constipation      Multiple Vitamins-Minerals (THERAPEUTIC MULTIVITAMIN-MINERALS) tablet Take 1 tablet by mouth daily      omeprazole (PRILOSEC) 20 MG delayed release capsule Take 20 mg by mouth daily      acetaminophen (TYLENOL) 325 MG tablet Take 650 mg by mouth every 4 hours as needed for Pain      dabigatran (PRADAXA) 150 MG capsule Take 1 capsule by mouth 2 times daily 60 capsule 3    metoprolol succinate (TOPROL XL) 25 MG extended release tablet Take 1 tablet by mouth every 12 hours (Patient taking differently: Take 25 mg by mouth in the morning and at bedtime) 30 tablet 3    levothyroxine (SYNTHROID) 75 MCG tablet Take 1 tablet by mouth Daily (Patient taking differently: Take 100 mcg by mouth Daily) 30 tablet 3    loratadine (CLARITIN) 10 MG tablet Take 10 mg by mouth daily      ASPIRIN LOW DOSE 81 MG EC tablet Take 81 mg by mouth daily      mirtazapine (REMERON) 30 MG tablet Take 30 mg by mouth at bedtime      primidone (MYSOLINE) 50 MG tablet Take 100 mg by mouth nightly      potassium chloride (KLOR-CON M) 20 MEQ extended release tablet Take 20 mEq by mouth daily      docusate sodium (COLACE) 100 MG capsule Take 100 mg by mouth daily       Probiotic Product (PROBIOTIC-10 PO) Take one 5 Billion cell capsule daily with breakfast      topiramate (TOPAMAX) 25 MG tablet Take 25 mg by mouth 2 times daily      Vitamin D (CHOLECALCIFEROL) 50 MCG (2000 UT) TABS tablet Take 2 tablets by mouth daily (Patient not taking: Reported on 10/18/2022) 60 tablet 3    megestrol (MEGACE) 40 MG/ML suspension Take 800 mg by mouth daily  (Patient not taking: Reported on 10/18/2022)      pantoprazole (PROTONIX) 40 MG tablet Take 40 mg by mouth daily (Patient not taking: Reported on 10/18/2022)      primidone (MYSOLINE) 50 MG tablet Take 50 mg by mouth every morning  (Patient not taking: Reported on 10/18/2022)       Current Facility-Administered Medications Medication Dose Route Frequency Provider Last Rate Last Admin    perflutren lipid microspheres (DEFINITY) injection 1.65 mg  1.5 mL IntraVENous ONCE PRN Lawrence Rodriguez MD                 Review of Systems:   Cardiac: As per HPI  General: Denies fever or chills  Pulmonary: As per HPI  HEENT: Denies runny nose  GI: No complaints  : No complaints  Endocrine: Denies night sweats  Musculoskeletal: No complaints  Skin: Dry skin  Neuro: No complaints  Psych: Denies depression    Physical Exam:  /76   Pulse 63   Resp 16   Ht 5' (1.524 m)   Wt 96 lb 1.6 oz (43.6 kg)   BMI 18.77 kg/m²   Wt Readings from Last 3 Encounters:   10/18/22 96 lb 1.6 oz (43.6 kg)   03/09/22 100 lb (45.4 kg)       Appearance: Alert and oriented x3 not in acute distress. Skin: Dry skin  Head: Atraumatic  Eyes: Intact extraocular muscles   ENMT: Mucous membranes are moist  Neck: Supple  Lungs: Clear to auscultation  Cardiac: Normal S1 and S2  Abdomen: Protuberant  Extremities: Intact range of motion  Neurologic: No focal neurological deficits  Peripheral Pulses: 2+ peripheral pulses      Laboratory Tests:  No results for input(s): NA, K, CL, CO2, BUN, CREATININE, GLUCOSE, CALCIUM in the last 72 hours. Lab Results   Component Value Date/Time    MG 2.0 03/11/2022 05:30 AM    MG 2.0 03/09/2022 12:50 PM     No results for input(s): ALKPHOS, ALT, AST, PROT, BILITOT, BILIDIR, LABALBU in the last 72 hours. No results for input(s): WBC, RBC, HGB, HCT, MCV, MCH, MCHC, RDW, PLT, MPV in the last 72 hours. No results found for: CKTOTAL, CKMB, CKMBINDEX, TROPONINI  No results for input(s): CKTOTAL, CKMB, CKMBINDEX, TROPHS in the last 72 hours.   No results found for: INR, PROTIME  Lab Results   Component Value Date    TSH 4.770 (H) 08/17/2022    TSH 14.270 (H) 05/11/2022    TSH 9.900 (H) 03/09/2022     No results found for: LABA1C  No results found for: EAG  No results found for: CHOL  No results found for: TRIG  No results found for: HDL  No results found for: LDLCALC, LDLCHOLESTEROL  No results found for: LABVLDL, VLDL  No results found for: CHOLHDLRATIO  No results for input(s): PROBNP in the last 72 hours. Cardiac Tests:  EKG reviewed (EKG date: Sinus rhythm 63 bpm):      Echocardiogram reviewed:     Stress test reviewed:      Cardiac catheterization reviewed:     CXR reviewed: The ASCVD Risk score (Melia MAZARIEGOS, et al., 2019) failed to calculate for the following reasons:    Cannot find a previous HDL lab    Cannot find a previous total cholesterol lab    ASSESSMENT / PLAN:    1. Paroxysmal atrial fibrillation (HCC)  He is taking his beta-blocker and is in sinus rhythm. He is on Pradaxa for [facility] and will discuss with pt and daughter during next visit to see is they would like to continue [facility] given reported prior nose bleed. Echo is arranged to guide therapy  - ECHO COMPLETE; Future  Daughter report unsteady gait but denies fall  If recurrent fall or nosebleed Pradaxa to be discontinued    2. Troponin I above reference range  Patient and daughter are not interested in an ischemic evaluation given lack of symptoms. He will follow-up in 3 months but is advised to call for earlier visit if symptoms are currently verbalized understanding  - ECHO COMPLETE; Future      3. Lower abdominal pain  Follow-up with your PCP  4. Nasal bleeding  Denies recurrent bleeding while on pradaxa   I will discuss with pt and daughter during next visit to see is they would like to continue [facility] given reported prior nose bleed  6. Small bowel obstruction ([address]  Follow-up with your PCP and surgery  7. Acquired hypothyroidism  Follow-up with your PCP         Follow in Return in about 3 months (around 1/18/2023). Thank you for allowing me to participate in your patient's care. Please feel free to contact me if you have any questions or concerns.     Mark Short MD  [facility]) Cardiology

## 2022-12-03 ENCOUNTER — HOSPITAL ENCOUNTER (EMERGENCY)
Age: 72
Discharge: HOME | End: 2022-12-03
Attending: EMERGENCY MEDICINE
Payer: MEDICARE

## 2022-12-03 ENCOUNTER — APPOINTMENT (OUTPATIENT)
Dept: CT IMAGING | Age: 72
End: 2022-12-03
Payer: MEDICARE

## 2022-12-03 VITALS
HEART RATE: 75 BPM | OXYGEN SATURATION: 95 % | DIASTOLIC BLOOD PRESSURE: 68 MMHG | TEMPERATURE: 98.2 F | WEIGHT: 96 LBS | BODY MASS INDEX: 18.75 KG/M2 | SYSTOLIC BLOOD PRESSURE: 99 MMHG | RESPIRATION RATE: 16 BRPM

## 2022-12-03 DIAGNOSIS — K57.32 DIVERTICULITIS OF COLON: Primary | ICD-10-CM

## 2022-12-03 LAB
ALBUMIN SERPL-MCNC: 3.8 G/DL (ref 3.5–5.2)
ALP BLD-CCNC: 68 U/L (ref 40–129)
ALT SERPL-CCNC: 11 U/L (ref 0–40)
ANION GAP SERPL CALCULATED.3IONS-SCNC: 8 MMOL/L (ref 7–16)
AST SERPL-CCNC: 19 U/L (ref 0–39)
BASOPHILS ABSOLUTE: 0.04 E9/L (ref 0–0.2)
BASOPHILS RELATIVE PERCENT: 0.4 % (ref 0–2)
BILIRUB SERPL-MCNC: 0.4 MG/DL (ref 0–1.2)
BUN BLDV-MCNC: 16 MG/DL (ref 6–23)
CALCIUM SERPL-MCNC: 10.1 MG/DL (ref 8.6–10.2)
CHLORIDE BLD-SCNC: 107 MMOL/L (ref 98–107)
CO2: 21 MMOL/L (ref 22–29)
CREAT SERPL-MCNC: 0.7 MG/DL (ref 0.7–1.2)
EOSINOPHILS ABSOLUTE: 0.21 E9/L (ref 0.05–0.5)
EOSINOPHILS RELATIVE PERCENT: 1.9 % (ref 0–6)
GFR SERPL CREATININE-BSD FRML MDRD: >60 ML/MIN/1.73
GLUCOSE BLD-MCNC: 97 MG/DL (ref 74–99)
HCT VFR BLD CALC: 38.8 % (ref 37–54)
HEMOGLOBIN: 12.8 G/DL (ref 12.5–16.5)
IMMATURE GRANULOCYTES #: 0.03 E9/L
IMMATURE GRANULOCYTES %: 0.3 % (ref 0–5)
LACTIC ACID: 1.2 MMOL/L (ref 0.5–2.2)
LIPASE: 32 U/L (ref 13–60)
LYMPHOCYTES ABSOLUTE: 1.31 E9/L (ref 1.5–4)
LYMPHOCYTES RELATIVE PERCENT: 12 % (ref 20–42)
MCH RBC QN AUTO: 30 PG (ref 26–35)
MCHC RBC AUTO-ENTMCNC: 33 % (ref 32–34.5)
MCV RBC AUTO: 91.1 FL (ref 80–99.9)
MONOCYTES ABSOLUTE: 0.71 E9/L (ref 0.1–0.95)
MONOCYTES RELATIVE PERCENT: 6.5 % (ref 2–12)
NEUTROPHILS ABSOLUTE: 8.63 E9/L (ref 1.8–7.3)
NEUTROPHILS RELATIVE PERCENT: 78.9 % (ref 43–80)
PDW BLD-RTO: 12.8 FL (ref 11.5–15)
PLATELET # BLD: 258 E9/L (ref 130–450)
PMV BLD AUTO: 10.8 FL (ref 7–12)
POTASSIUM SERPL-SCNC: 4.2 MMOL/L (ref 3.5–5)
RBC # BLD: 4.26 E12/L (ref 3.8–5.8)
SODIUM BLD-SCNC: 136 MMOL/L (ref 132–146)
TOTAL PROTEIN: 7.1 G/DL (ref 6.4–8.3)
WBC # BLD: 10.9 E9/L (ref 4.5–11.5)

## 2022-12-03 PROCEDURE — 74177 CT ABD & PELVIS W/CONTRAST: CPT

## 2022-12-03 PROCEDURE — 85025 COMPLETE CBC W/AUTO DIFF WBC: CPT

## 2022-12-03 PROCEDURE — 6360000004 HC RX CONTRAST MEDICATION: Performed by: RADIOLOGY

## 2022-12-03 PROCEDURE — 99285 EMERGENCY DEPT VISIT HI MDM: CPT

## 2022-12-03 PROCEDURE — 80053 COMPREHEN METABOLIC PANEL: CPT

## 2022-12-03 PROCEDURE — 83690 ASSAY OF LIPASE: CPT

## 2022-12-03 PROCEDURE — 83605 ASSAY OF LACTIC ACID: CPT

## 2022-12-03 PROCEDURE — 6370000000 HC RX 637 (ALT 250 FOR IP)

## 2022-12-03 RX ORDER — HYDROCODONE BITARTRATE AND ACETAMINOPHEN 5; 325 MG/1; MG/1
1 TABLET ORAL EVERY 8 HOURS PRN
Qty: 9 TABLET | Refills: 0 | Status: SHIPPED | OUTPATIENT
Start: 2022-12-03 | End: 2022-12-06

## 2022-12-03 RX ORDER — AMOXICILLIN AND CLAVULANATE POTASSIUM 875; 125 MG/1; MG/1
1 TABLET, FILM COATED ORAL 2 TIMES DAILY
Qty: 20 TABLET | Refills: 0 | Status: SHIPPED | OUTPATIENT
Start: 2022-12-03 | End: 2022-12-13

## 2022-12-03 RX ORDER — FENTANYL CITRATE 50 UG/ML
25 INJECTION, SOLUTION INTRAMUSCULAR; INTRAVENOUS ONCE
Status: DISCONTINUED | OUTPATIENT
Start: 2022-12-03 | End: 2022-12-03 | Stop reason: HOSPADM

## 2022-12-03 RX ORDER — AMOXICILLIN AND CLAVULANATE POTASSIUM 875; 125 MG/1; MG/1
1 TABLET, FILM COATED ORAL ONCE
Status: COMPLETED | OUTPATIENT
Start: 2022-12-03 | End: 2022-12-03

## 2022-12-03 RX ADMIN — IOPAMIDOL 75 ML: 755 INJECTION, SOLUTION INTRAVENOUS at 19:29

## 2022-12-03 RX ADMIN — AMOXICILLIN AND CLAVULANATE POTASSIUM 1 TABLET: 875; 125 TABLET, FILM COATED ORAL at 21:37

## 2022-12-03 ASSESSMENT — PAIN - FUNCTIONAL ASSESSMENT: PAIN_FUNCTIONAL_ASSESSMENT: NONE - DENIES PAIN

## 2022-12-03 NOTE — ED PROVIDER NOTES
81y.o. year old male presenting to the emergency room with concerns of abdominal pain. Reports that symptom's onset 12:30 PM. Worsen with . Improves with none. Severity of mild, with no radiation generalized. Symptoms are constant in timing. Symptoms described as abdominal pain, cramping. associated symptoms of  none. Patient in  no acute distress. Patient [facility]. Hx of SBO, no previous abdominal surgeries. Chief Complaint   Patient presents with    Abdominal Pain     Right sided cp \"a tiny bit\"       Review of Systems   Constitutional:  Negative for chills, fatigue and fever. HENT:  Negative for congestion, rhinorrhea, trouble swallowing and voice change. Eyes:  Negative for photophobia and visual disturbance. Respiratory:  Negative for cough, shortness of breath and wheezing. Cardiovascular:  Negative for chest pain. Gastrointestinal:  Positive for abdominal pain. Negative for diarrhea, nausea and vomiting. Genitourinary:  Negative for dysuria, hematuria and urgency. Musculoskeletal:  Negative for arthralgias, back pain, neck pain and neck stiffness. Skin:  Negative for wound. Neurological:  Negative for dizziness, syncope, weakness, numbness and headaches. Psychiatric/Behavioral:  Negative for behavioral problems and confusion. The patient is nervous/anxious. Physical Exam  Vitals reviewed. Constitutional:       General: He is not in acute distress. Appearance: Normal appearance. He is well-developed. HENT:      Head: Normocephalic. Right Ear: External ear normal.      Left Ear: External ear normal.      Nose: Nose normal.      Mouth/Throat:      Pharynx: No pharyngeal swelling. Eyes:      General: No scleral icterus. Right eye: No discharge. Left eye: No discharge. Conjunctiva/sclera: Conjunctivae normal.   Cardiovascular:      Rate and Rhythm: Normal rate and regular rhythm. Heart sounds: No friction rub. No gallop.    Pulmonary: Effort: Pulmonary effort is normal. No respiratory distress. Breath sounds: No stridor. No rhonchi or rales. Abdominal:      General: There is no distension. Palpations: Abdomen is soft. Tenderness: There is generalized abdominal tenderness. There is no guarding or rebound. Musculoskeletal:         General: No tenderness or deformity. Cervical back: Normal range of motion. No rigidity or tenderness. Skin:     General: Skin is warm. Coloration: Skin is not jaundiced. Findings: No rash. Neurological:      Mental Status: He is alert and oriented to person, place, and time. Sensory: No sensory deficit. Motor: No weakness. Psychiatric:         Mood and Affect: Mood is anxious. Behavior: Behavior normal.        Procedures     CT ABDOMEN PELVIS W IV CONTRAST Additional Contrast? None   Final Result   Thickening of the mid sigmoid colon with adjacent inflammatory stranding. Correlate with diverticulitis clinically. MDM  Number of Diagnoses or Management Options  Diverticulitis of colon  Diagnosis management comments: 81year old male presenting to the ER with complaints of abdominal pain. Patient with previous history of bowel obstruction, conservatively managed. Mild tenderness on abdominal exam. - lactic. - lipase. No  electrolyte abnormality with kidney and liver function with in normal limits as interpreted by me. No elevation of WBC noted. CT abdomen and pelvis with inflammatory changes as interpreted by me, final read by radiologist + for diverticulitis. Spoke with patient and caregiver, discussed results, and through shared decision making, will plan to discharge at this time with followup with PCP. Consulted with pharmacist, and discussed patient, Augmentin ordered.  POA to take patient back to facility, first dose Augmentin oral given.                     --------------------------------------------- PAST HISTORY ---------------------------------------------  Past Medical History:  has a past medical history of A-fib ([facility] [address] Cognitive impairment, Cystoma, [facility] failure ([facility] Hyperlipidemia, and Hypothyroid. Past Surgical History:  has a past surgical history that includes Cholecystectomy. Social History:  reports that he has never smoked. He has never used smokeless tobacco. He reports that he does not currently use alcohol. He reports that he does not use drugs. Family History: Family history is unknown by patient. The patients home medications have been reviewed. Allergies: Patient has no known allergies.     -------------------------------------------------- RESULTS -------------------------------------------------  Labs:  Results for orders placed or performed during the hospital encounter of 12/03/22   CBC with Auto Differential   Result Value Ref Range    WBC 10.9 4.5 - 11.5 E9/L    RBC 4.26 3.80 - 5.80 E12/L    Hemoglobin 12.8 12.5 - 16.5 g/dL    Hematocrit 38.8 37.0 - 54.0 %    MCV 91.1 80.0 - 99.9 fL    MCH 30.0 26.0 - 35.0 pg    MCHC 33.0 32.0 - 34.5 %    RDW 12.8 11.5 - 15.0 fL    Platelets 242 169 - 328 E9/L    MPV 10.8 7.0 - 12.0 fL    Neutrophils % 78.9 43.0 - 80.0 %    Immature Granulocytes % 0.3 0.0 - 5.0 %    Lymphocytes % 12.0 (L) 20.0 - 42.0 %    Monocytes % 6.5 2.0 - 12.0 %    Eosinophils % 1.9 0.0 - 6.0 %    Basophils % 0.4 0.0 - 2.0 %    Neutrophils Absolute 8.63 (H) 1.80 - 7.30 E9/L    Immature Granulocytes # 0.03 E9/L    Lymphocytes Absolute 1.31 (L) 1.50 - 4.00 E9/L    Monocytes Absolute 0.71 0.10 - 0.95 E9/L    Eosinophils Absolute 0.21 0.05 - 0.50 E9/L    Basophils Absolute 0.04 0.00 - 0.20 E9/L   CMP   Result Value Ref Range    Sodium 136 132 - 146 mmol/L    Potassium 4.2 3.5 - 5.0 mmol/L    Chloride 107 98 - 107 mmol/L    CO2 21 (L) 22 - 29 mmol/L    Anion Gap 8 7 - 16 mmol/L    Glucose 97 74 - 99 mg/dL    BUN 16 6 - 23 mg/dL    Creatinine 0.7 0.7 - 1.2 mg/dL    Est, Glom Filt Rate >60 >=60 mL/min/1.73    Calcium 10.1 8.6 - 10.2 mg/dL    Total Protein 7.1 6.4 - 8.3 g/dL    Albumin 3.8 3.5 - 5.2 g/dL    Total Bilirubin 0.4 0.0 - 1.2 mg/dL    Alkaline Phosphatase 68 40 - 129 U/L    ALT 11 0 - 40 U/L    AST 19 0 - 39 U/L   Lipase   Result Value Ref Range    Lipase 32 13 - 60 U/L   Lactic Acid   Result Value Ref Range    Lactic Acid 1.2 0.5 - 2.2 mmol/L       Radiology:  CT ABDOMEN PELVIS W IV CONTRAST Additional Contrast? None   Final Result   Thickening of the mid sigmoid colon with adjacent inflammatory stranding. Correlate with diverticulitis clinically. ------------------------- NURSING NOTES AND VITALS REVIEWED ---------------------------  Date / Time Roomed:  12/3/2022  3:28 PM  ED Bed Assignment:  LANI MATUTE/KEY    The nursing notes within the ED encounter and vital signs as below have been reviewed. BP 99/68   Pulse 75   Temp 98.2 °F (36.8 °C) (Oral)   Resp 16   Wt 96 lb (43.5 kg)   SpO2 95%   BMI 18.75 kg/m²   Oxygen Saturation Interpretation: Normal      ------------------------------------------ PROGRESS NOTES ------------------------------------------  I have spoken with the patient and discussed todays results, in addition to providing specific details for the plan of care and counseling regarding the diagnosis and prognosis. Their questions are answered at this time and they are agreeable with the plan. I discussed at length with them reasons for immediate return here for re evaluation. They will followup with primary care by calling their office tomorrow. --------------------------------- ADDITIONAL PROVIDER NOTES ---------------------------------  At this time the patient is without objective evidence of an acute process requiring hospitalization or inpatient management. They have remained hemodynamically stable throughout their entire ED visit and are stable for discharge with outpatient follow-up.      The plan has been discussed in detail and they are aware of the specific conditions for emergent return, as well as the importance of follow-up. Discharge Medication List as of 12/3/2022  8:55 PM        START taking these medications    Details   HYDROcodone-acetaminophen (NORCO) 5-325 MG per tablet Take 1 tablet by mouth every 8 hours as needed for Pain for up to 3 days. Intended supply: 3 days. Take lowest dose possible to manage pain, Disp-9 tablet, R-0Print      amoxicillin-clavulanate (AUGMENTIN) 875-125 MG per tablet Take 1 tablet by mouth 2 times daily for 10 days, Disp-20 tablet, R-0Normal             Diagnosis:  1. Diverticulitis of colon        Disposition:  Patient's disposition: Discharge to nursing home  Patient's condition is stable. Attending was present and available throughout encounter including all critical portions;  See Attending Note/Attestation for Final Sarah Howard DO  Resident  12/04/22 [id number]

## 2022-12-04 ASSESSMENT — ENCOUNTER SYMPTOMS
ABDOMINAL PAIN: 1
WHEEZING: 0
SHORTNESS OF BREATH: 0
VOICE CHANGE: 0
VOMITING: 0
DIARRHEA: 0
RHINORRHEA: 0
PHOTOPHOBIA: 0
BACK PAIN: 0
NAUSEA: 0
COUGH: 0
TROUBLE SWALLOWING: 0

## 2022-12-04 NOTE — ED NOTES
Vitals obtained and stable. IV removed pressure dressing applied.  Pt POA transporting pt back to facility via [facility] in stable condition upon OOD        Irene aCndelario RN  12/03/22 [id number]

## 2022-12-04 NOTE — ED NOTES
Report received from [facility] assuming care of pt at this time        Anila Amador RN  12/03/22 [id number]

## 2022-12-23 LAB
SARS-COV-2: NOT DETECTED
SOURCE: NORMAL

## 2023-01-23 ENCOUNTER — OFFICE VISIT (OUTPATIENT)
Dept: CARDIOLOGY CLINIC | Age: 73
End: 2023-01-23
Payer: MEDICARE

## 2023-01-23 VITALS
BODY MASS INDEX: 18.65 KG/M2 | SYSTOLIC BLOOD PRESSURE: 108 MMHG | HEART RATE: 77 BPM | DIASTOLIC BLOOD PRESSURE: 80 MMHG | WEIGHT: 95 LBS | HEIGHT: 60 IN | RESPIRATION RATE: 12 BRPM

## 2023-01-23 DIAGNOSIS — I48.0 PAROXYSMAL ATRIAL FIBRILLATION (HCC): Primary | ICD-10-CM

## 2023-01-23 DIAGNOSIS — K57.92 DIVERTICULITIS: ICD-10-CM

## 2023-01-23 DIAGNOSIS — E44.0 MODERATE PROTEIN-CALORIE MALNUTRITION (HCC): ICD-10-CM

## 2023-01-23 DIAGNOSIS — K56.609 SMALL BOWEL OBSTRUCTION (HCC): ICD-10-CM

## 2023-01-23 DIAGNOSIS — R77.8 TROPONIN I ABOVE REFERENCE RANGE: ICD-10-CM

## 2023-01-23 DIAGNOSIS — E03.9 ACQUIRED HYPOTHYROIDISM: ICD-10-CM

## 2023-01-23 DIAGNOSIS — R10.30 LOWER ABDOMINAL PAIN: ICD-10-CM

## 2023-01-23 PROCEDURE — G8484 FLU IMMUNIZE NO ADMIN: HCPCS | Performed by: INTERNAL MEDICINE

## 2023-01-23 PROCEDURE — 99214 OFFICE O/P EST MOD 30 MIN: CPT | Performed by: INTERNAL MEDICINE

## 2023-01-23 PROCEDURE — 3017F COLORECTAL CA SCREEN DOC REV: CPT | Performed by: INTERNAL MEDICINE

## 2023-01-23 PROCEDURE — 1036F TOBACCO NON-USER: CPT | Performed by: INTERNAL MEDICINE

## 2023-01-23 PROCEDURE — 93000 ELECTROCARDIOGRAM COMPLETE: CPT | Performed by: INTERNAL MEDICINE

## 2023-01-23 PROCEDURE — G8420 CALC BMI NORM PARAMETERS: HCPCS | Performed by: INTERNAL MEDICINE

## 2023-01-23 PROCEDURE — 1123F ACP DISCUSS/DSCN MKR DOCD: CPT | Performed by: INTERNAL MEDICINE

## 2023-01-23 PROCEDURE — G8427 DOCREV CUR MEDS BY ELIG CLIN: HCPCS | Performed by: INTERNAL MEDICINE

## 2023-01-23 RX ORDER — ROSUVASTATIN CALCIUM 10 MG/1
TABLET, COATED ORAL
COMMUNITY
Start: 2023-01-09

## 2023-01-23 RX ORDER — L. ACIDOPHILUS/PECTIN, CITRUS 25MM-100MG
TABLET ORAL
COMMUNITY
Start: 2022-12-13

## 2023-01-23 RX ORDER — MEGESTROL ACETATE 20 MG/1
TABLET ORAL
COMMUNITY
Start: 2023-01-09

## 2023-01-23 NOTE — PROGRESS NOTES
Out Patient CARDIOLOGY Follow Up Visit    Name: Francisco J Sands    Age: 81 y.o. Date of Admission: No admission date for patient encounter. Date of Service: 1/23/2023    Reason for Consultation:   Chief Complaint   Patient presents with    3 Month Follow-Up          Referring Physician: No admitting provider for patient encounter. History of Present Illness: 63-year-old male with history of mentally challenged/cognitive impairment, hypothyroidism, hyperlipidemia and prior history of nosebleeds and gait instability who was recently hospitalized for evaluation of nausea vomiting and found to have partial small bowel obstruction. He was also noted to have new onset atrial fibrillation and a mild troponin leak. She was conservatively managed from the partial small bowel obstruction. Patient and family declined ischemic evaluation for the mild troponin leak. Echocardiogram was arranged to guide therapy. Patient present for follow-up today with the daughter and report has not undergone the echocardiogram yet. He reports since last visit he has been hospitalized for diverticulitis for which he was declined to undergo surgery because of malnourishment. On today's visit, he denies any chest pain but report abdominal discomfort for he is following up with PCP and surgery. I have arranged for echocardiogram again and he will follow-up in 3 months.     PAF with RVR in the setting of poor oral intake, nausea and vomiting  Suspected small bowel obstruction being treated conservatively  Severe frailty, weight is 45 kg  Borderline myocardial injury in the setting of the above (high-sensitivity troponin 14 then 14/2 hours)               Past Medical History:  Past Medical History:   Diagnosis Date    A-fib ([address]     Cognitive impairment     Cystoma     Heart failure ([address]     Hyperlipidemia     Hypothyroid        Past Surgical History:  Past Surgical History:   Procedure Laterality Date    CHOLECYSTECTOMY Family History:  Family History   Family history unknown: Yes       Social History:  Social History     Socioeconomic History    Marital status: Single     Spouse name: Not on file    Number of children: Not on file    Years of education: Not on file    Highest education level: Not on file   Occupational History    Not on file   Tobacco Use    Smoking status: Never    Smokeless tobacco: Never   Vaping Use    Vaping Use: Never used   Substance and Sexual Activity    Alcohol use: Not Currently    Drug use: Never    Sexual activity: Not on file   Other Topics Concern    Not on file   Social History Narrative    Not on file     Social Determinants of Health     Financial Resource Strain: Not on file   Food Insecurity: Not on file   Transportation Needs: Not on file   Physical Activity: Not on file   Stress: Not on file   Social Connections: Not on file   Intimate Partner Violence: Not on file   Housing Stability: Not on file       Allergies:  No Known Allergies    Home Medications:  Prior to Admission medications    Medication Sig Start Date End Date Taking?  Authorizing Provider   Lactobacillus Acid-Pectin (ACIDOPHILUS/CITRUS PECTIN) TABS  12/13/22  Yes Historical Provider, MD   megestrol (MEGACE) 20 MG tablet TAKE 1 TABLET BY MOUTH 3 TIMES DAILY 1/9/23  Yes Historical Provider, MD   rosuvastatin (CRESTOR) 10 MG tablet TAKE ONE(1) TABLET BY MOUTH ONCE DAILY 1/9/23  Yes Historical Provider, MD   bisacodyl (DULCOLAX) 10 MG suppository Place 10 mg rectally every 24 hours   Yes Historical Provider, MD   magnesium hydroxide (MILK OF MAGNESIA) 400 MG/5ML suspension Take by mouth daily as needed for Constipation   Yes Historical Provider, MD   acetaminophen (TYLENOL) 325 MG tablet Take 650 mg by mouth every 4 hours as needed for Pain   Yes Historical Provider, MD   levothyroxine (SYNTHROID) 75 MCG tablet Take 1 tablet by mouth Daily  Patient taking differently: Take 100 mcg by mouth Daily 3/16/22  Yes Digna Badillo MD loratadine (CLARITIN) 10 MG tablet Take 10 mg by mouth daily 2/8/22  Yes Historical Provider, MD   ASPIRIN LOW DOSE 81 MG EC tablet Take 81 mg by mouth daily 2/8/22  Yes Historical Provider, MD   mirtazapine (REMERON) 30 MG tablet Take 15 mg by mouth at bedtime 3/8/22  Yes Historical Provider, MD   docusate sodium (COLACE) 100 MG capsule Take 100 mg by mouth daily    Yes Historical Provider, MD   pantoprazole (PROTONIX) 40 MG tablet Take 40 mg by mouth daily   Yes Historical Provider, MD   primidone (MYSOLINE) 50 MG tablet Take 50 mg by mouth 2 times daily   Yes Historical Provider, MD   Multiple Vitamins-Minerals (THERAPEUTIC MULTIVITAMIN-MINERALS) tablet Take 1 tablet by mouth daily    Historical Provider, MD   omeprazole (PRILOSEC) 20 MG delayed release capsule Take 20 mg by mouth daily    Historical Provider, MD   dabigatran (PRADAXA) 150 MG capsule Take 1 capsule by mouth 2 times daily 3/15/22   Marcianne Mcardle, MD   metoprolol succinate (TOPROL XL) 25 MG extended release tablet Take 1 tablet by mouth every 12 hours  Patient taking differently: Take 25 mg by mouth in the morning and at bedtime 3/15/22   Marcianne Mcardle, MD   Vitamin D (CHOLECALCIFEROL) 50 MCG (2000 UT) TABS tablet Take 2 tablets by mouth daily 3/16/22   Marcianne Mcardle, MD   primidone (MYSOLINE) 50 MG tablet Take 100 mg by mouth nightly    Historical Provider, MD   potassium chloride (KLOR-CON M) 20 MEQ extended release tablet Take 20 mEq by mouth daily    Historical Provider, MD   Probiotic Product (PROBIOTIC-10 PO) Take one 5 Billion cell capsule daily with breakfast    Historical Provider, MD   topiramate (TOPAMAX) 25 MG tablet Take 25 mg by mouth 2 times daily    Historical Provider, MD       Current Medications:  Current Outpatient Medications   Medication Sig Dispense Refill    Lactobacillus Acid-Pectin (ACIDOPHILUS/CITRUS PECTIN) TABS       megestrol (MEGACE) 20 MG tablet TAKE 1 TABLET BY MOUTH 3 TIMES DAILY      rosuvastatin (CRESTOR) 10 MG tablet TAKE ONE(1) TABLET BY MOUTH ONCE DAILY      bisacodyl (DULCOLAX) 10 MG suppository Place 10 mg rectally every 24 hours      magnesium hydroxide (MILK OF MAGNESIA) 400 MG/5ML suspension Take by mouth daily as needed for Constipation      acetaminophen (TYLENOL) 325 MG tablet Take 650 mg by mouth every 4 hours as needed for Pain      levothyroxine (SYNTHROID) 75 MCG tablet Take 1 tablet by mouth Daily (Patient taking differently: Take 100 mcg by mouth Daily) 30 tablet 3    loratadine (CLARITIN) 10 MG tablet Take 10 mg by mouth daily      ASPIRIN LOW DOSE 81 MG EC tablet Take 81 mg by mouth daily      mirtazapine (REMERON) 30 MG tablet Take 15 mg by mouth at bedtime      docusate sodium (COLACE) 100 MG capsule Take 100 mg by mouth daily       pantoprazole (PROTONIX) 40 MG tablet Take 40 mg by mouth daily      primidone (MYSOLINE) 50 MG tablet Take 50 mg by mouth 2 times daily      Multiple Vitamins-Minerals (THERAPEUTIC MULTIVITAMIN-MINERALS) tablet Take 1 tablet by mouth daily      omeprazole (PRILOSEC) 20 MG delayed release capsule Take 20 mg by mouth daily      dabigatran (PRADAXA) 150 MG capsule Take 1 capsule by mouth 2 times daily 60 capsule 3    metoprolol succinate (TOPROL XL) 25 MG extended release tablet Take 1 tablet by mouth every 12 hours (Patient taking differently: Take 25 mg by mouth in the morning and at bedtime) 30 tablet 3    Vitamin D (CHOLECALCIFEROL) 50 MCG (2000 UT) TABS tablet Take 2 tablets by mouth daily 60 tablet 3    primidone (MYSOLINE) 50 MG tablet Take 100 mg by mouth nightly      potassium chloride (KLOR-CON M) 20 MEQ extended release tablet Take 20 mEq by mouth daily      Probiotic Product (PROBIOTIC-10 PO) Take one 5 Billion cell capsule daily with breakfast      topiramate (TOPAMAX) 25 MG tablet Take 25 mg by mouth 2 times daily       Current Facility-Administered Medications   Medication Dose Route Frequency Provider Last Rate Last Admin    perflutren lipid microspheres (DEFINITY) injection 1.5 mL  1.5 mL IntraVENous ONCE PRN Lawrence Rodriguez MD        perflutren lipid microspheres (DEFINITY) injection 1.65 mg  1.5 mL IntraVENous ONCE PRN Lawrence Rodriguez MD                 Review of Systems:   Cardiac: As per HPI  General: Denies fever or chills  Pulmonary: As per HPI  HEENT: Denies runny nose  GI: No complaints  : No complaints  Endocrine: Denies night sweats  Musculoskeletal: No complaints  Skin: Dry skin  Neuro: No complaints  Psych: Denies depression    Physical Exam:  /80   Pulse 77   Resp 12   Ht 5' (1.524 m)   Wt 95 lb (43.1 kg)   BMI 18.55 kg/m²   Wt Readings from Last 3 Encounters:   01/23/23 95 lb (43.1 kg)   12/03/22 96 lb (43.5 kg)   10/18/22 96 lb 1.6 oz (43.6 kg)       Appearance: Alert and oriented x3 not in acute distress. Skin: Dry skin  Head: Atraumatic  Eyes: Intact extraocular muscles   ENMT: Mucous membranes are moist  Neck: Supple  Lungs: Clear to auscultation  Cardiac: Normal S1 and S2  Abdomen: Protuberant  Extremities: Intact range of motion  Neurologic: No focal neurological deficits  Peripheral Pulses: 2+ peripheral pulses      Laboratory Tests:  No results for input(s): NA, K, CL, CO2, BUN, CREATININE, GLUCOSE, CALCIUM in the last 72 hours. Lab Results   Component Value Date/Time    MG 2.0 03/11/2022 05:30 AM    MG 2.0 03/09/2022 12:50 PM     No results for input(s): ALKPHOS, ALT, AST, PROT, BILITOT, BILIDIR, LABALBU in the last 72 hours. No results for input(s): WBC, RBC, HGB, HCT, MCV, MCH, MCHC, RDW, PLT, MPV in the last 72 hours. No results found for: CKTOTAL, CKMB, CKMBINDEX, TROPONINI  No results for input(s): CKTOTAL, CKMB, CKMBINDEX, TROPHS in the last 72 hours.   No results found for: INR, PROTIME  Lab Results   Component Value Date    TSH 6.830 (H) 01/19/2023    TSH 4.770 (H) 08/17/2022    TSH 14.270 (H) 05/11/2022     No results found for: LABA1C  No results found for: EAG  No results found for: CHOL  No results found for: TRIG  No results found for: HDL  No results found for: LDLCALC, LDLCHOLESTEROL  No results found for: LABVLDL, VLDL  No results found for: CHOLHDLRATIO  No results for input(s): PROBNP in the last 72 hours. Cardiac Tests:  EKG reviewed (EKG date: Sinus rhythm 77 bpm):      Echocardiogram reviewed:     Stress test reviewed:      Cardiac catheterization reviewed:     CXR reviewed: The ASCVD Risk score (Melia MAZARIEGOS, et al., 2019) failed to calculate for the following reasons:    Cannot find a previous HDL lab    Cannot find a previous total cholesterol lab    ASSESSMENT / PLAN:    1. Paroxysmal atrial fibrillation (HCC)  Currently asymptomatic  Continue beta-blocker (metoprolol succinate) and anticoagulation (Pradaxa)    2. Troponin I above reference range  Patient and daughter are not interested in an ischemic evaluation given lack of symptoms. Echocardiogram is arranged to guide medical management  - ECHO COMPLETE; Future    3. Lower abdominal pain  Follow-up with your PCP  4. Nasal bleeding  Denies recurrent  6. Small bowel obstruction (HCC)/diverticulitis  Follow-up with your PCP and surgery  7. Acquired hypothyroidism  Follow-up with your PCP         Follow in Return in about 6 months (around 7/23/2023). Thank you for allowing me to participate in your patient's care. Please feel free to contact me if you have any questions or concerns.     Nelli Cage MD  [facility]) Cardiology

## 2024-04-09 ENCOUNTER — HOSPITAL ENCOUNTER (INPATIENT)
Age: 74
LOS: 4 days | Discharge: NURSING, REHAB OR LONG-TERM CARE | DRG: 378 | End: 2024-04-13
Attending: EMERGENCY MEDICINE | Admitting: INTERNAL MEDICINE
Payer: MEDICARE

## 2024-04-09 DIAGNOSIS — K92.2 UPPER GI BLEED: ICD-10-CM

## 2024-04-09 DIAGNOSIS — R79.89 TROPONIN I ABOVE REFERENCE RANGE: ICD-10-CM

## 2024-04-09 DIAGNOSIS — I48.0 PAROXYSMAL ATRIAL FIBRILLATION (HCC): ICD-10-CM

## 2024-04-09 DIAGNOSIS — K92.0 HEMATEMESIS, UNSPECIFIED WHETHER NAUSEA PRESENT: Primary | ICD-10-CM

## 2024-04-09 PROBLEM — I48.91 ATRIAL FIBRILLATION (HCC): Status: ACTIVE | Noted: 2024-04-09

## 2024-04-09 PROBLEM — E78.5 HYPERLIPIDEMIA: Status: ACTIVE | Noted: 2024-04-09

## 2024-04-09 LAB
ALBUMIN SERPL-MCNC: 3.7 G/DL (ref 3.5–5.2)
ALP SERPL-CCNC: 49 U/L (ref 40–129)
ALT SERPL-CCNC: 20 U/L (ref 0–40)
ANION GAP SERPL CALCULATED.3IONS-SCNC: 14 MMOL/L (ref 7–16)
AST SERPL-CCNC: 44 U/L (ref 0–39)
BASOPHILS # BLD: 0.06 K/UL (ref 0–0.2)
BASOPHILS NFR BLD: 1 % (ref 0–2)
BILIRUB SERPL-MCNC: 0.2 MG/DL (ref 0–1.2)
BUN SERPL-MCNC: 24 MG/DL (ref 6–23)
CALCIUM SERPL-MCNC: 9.5 MG/DL (ref 8.6–10.2)
CHLORIDE SERPL-SCNC: 105 MMOL/L (ref 98–107)
CO2 SERPL-SCNC: 20 MMOL/L (ref 22–29)
CREAT SERPL-MCNC: 0.7 MG/DL (ref 0.7–1.2)
EOSINOPHIL # BLD: 0.18 K/UL (ref 0.05–0.5)
EOSINOPHILS RELATIVE PERCENT: 2 % (ref 0–6)
ERYTHROCYTE [DISTWIDTH] IN BLOOD BY AUTOMATED COUNT: 16.6 % (ref 11.5–15)
GFR SERPL CREATININE-BSD FRML MDRD: >90 ML/MIN/1.73M2
GLUCOSE SERPL-MCNC: 120 MG/DL (ref 74–99)
HCT VFR BLD AUTO: 27.6 % (ref 37–54)
HGB BLD-MCNC: 8.3 G/DL (ref 12.5–16.5)
IMM GRANULOCYTES # BLD AUTO: 0.04 K/UL (ref 0–0.58)
IMM GRANULOCYTES NFR BLD: 0 % (ref 0–5)
INR PPP: 1.2
LYMPHOCYTES NFR BLD: 1.38 K/UL (ref 1.5–4)
LYMPHOCYTES RELATIVE PERCENT: 13 % (ref 20–42)
MAGNESIUM SERPL-MCNC: 2.2 MG/DL (ref 1.6–2.6)
MCH RBC QN AUTO: 22.3 PG (ref 26–35)
MCHC RBC AUTO-ENTMCNC: 30.1 G/DL (ref 32–34.5)
MCV RBC AUTO: 74 FL (ref 80–99.9)
MONOCYTES NFR BLD: 0.76 K/UL (ref 0.1–0.95)
MONOCYTES NFR BLD: 7 % (ref 2–12)
NEUTROPHILS NFR BLD: 77 % (ref 43–80)
NEUTS SEG NFR BLD: 8.31 K/UL (ref 1.8–7.3)
PLATELET # BLD AUTO: 463 K/UL (ref 130–450)
PMV BLD AUTO: 10.5 FL (ref 7–12)
POTASSIUM SERPL-SCNC: 5.1 MMOL/L (ref 3.5–5)
PROT SERPL-MCNC: 7.1 G/DL (ref 6.4–8.3)
PROTHROMBIN TIME: 13.2 SEC (ref 9.3–12.4)
RBC # BLD AUTO: 3.73 M/UL (ref 3.8–5.8)
SODIUM SERPL-SCNC: 139 MMOL/L (ref 132–146)
WBC OTHER # BLD: 10.7 K/UL (ref 4.5–11.5)

## 2024-04-09 PROCEDURE — 85025 COMPLETE CBC W/AUTO DIFF WBC: CPT

## 2024-04-09 PROCEDURE — 83735 ASSAY OF MAGNESIUM: CPT

## 2024-04-09 PROCEDURE — 1200000000 HC SEMI PRIVATE

## 2024-04-09 PROCEDURE — 96374 THER/PROPH/DIAG INJ IV PUSH: CPT

## 2024-04-09 PROCEDURE — 6360000002 HC RX W HCPCS: Performed by: EMERGENCY MEDICINE

## 2024-04-09 PROCEDURE — 6370000000 HC RX 637 (ALT 250 FOR IP): Performed by: INTERNAL MEDICINE

## 2024-04-09 PROCEDURE — 99223 1ST HOSP IP/OBS HIGH 75: CPT | Performed by: INTERNAL MEDICINE

## 2024-04-09 PROCEDURE — 85610 PROTHROMBIN TIME: CPT

## 2024-04-09 PROCEDURE — 99285 EMERGENCY DEPT VISIT HI MDM: CPT

## 2024-04-09 PROCEDURE — 80053 COMPREHEN METABOLIC PANEL: CPT

## 2024-04-09 PROCEDURE — 93005 ELECTROCARDIOGRAM TRACING: CPT | Performed by: INTERNAL MEDICINE

## 2024-04-09 PROCEDURE — 86923 COMPATIBILITY TEST ELECTRIC: CPT

## 2024-04-09 PROCEDURE — 86901 BLOOD TYPING SEROLOGIC RH(D): CPT

## 2024-04-09 PROCEDURE — 86850 RBC ANTIBODY SCREEN: CPT

## 2024-04-09 PROCEDURE — 96375 TX/PRO/DX INJ NEW DRUG ADDON: CPT

## 2024-04-09 PROCEDURE — 86900 BLOOD TYPING SEROLOGIC ABO: CPT

## 2024-04-09 PROCEDURE — C9113 INJ PANTOPRAZOLE SODIUM, VIA: HCPCS | Performed by: EMERGENCY MEDICINE

## 2024-04-09 RX ORDER — METOPROLOL SUCCINATE 25 MG/1
25 TABLET, EXTENDED RELEASE ORAL 2 TIMES DAILY
Status: DISCONTINUED | OUTPATIENT
Start: 2024-04-09 | End: 2024-04-09

## 2024-04-09 RX ORDER — DABIGATRAN ETEXILATE 75 MG/1
150 CAPSULE ORAL 2 TIMES DAILY
Status: DISCONTINUED | OUTPATIENT
Start: 2024-04-09 | End: 2024-04-09

## 2024-04-09 RX ORDER — PANTOPRAZOLE SODIUM 40 MG/10ML
40 INJECTION, POWDER, LYOPHILIZED, FOR SOLUTION INTRAVENOUS 2 TIMES DAILY
Status: DISCONTINUED | OUTPATIENT
Start: 2024-04-10 | End: 2024-04-09

## 2024-04-09 RX ORDER — ACETAMINOPHEN 650 MG/1
650 SUPPOSITORY RECTAL EVERY 6 HOURS PRN
Status: DISCONTINUED | OUTPATIENT
Start: 2024-04-09 | End: 2024-04-13 | Stop reason: HOSPADM

## 2024-04-09 RX ORDER — SODIUM CHLORIDE 9 MG/ML
INJECTION, SOLUTION INTRAVENOUS PRN
Status: DISCONTINUED | OUTPATIENT
Start: 2024-04-09 | End: 2024-04-13 | Stop reason: HOSPADM

## 2024-04-09 RX ORDER — CETIRIZINE HYDROCHLORIDE 10 MG/1
10 TABLET ORAL DAILY
Status: DISCONTINUED | OUTPATIENT
Start: 2024-04-10 | End: 2024-04-13 | Stop reason: HOSPADM

## 2024-04-09 RX ORDER — HYDRALAZINE HYDROCHLORIDE 20 MG/ML
10 INJECTION INTRAMUSCULAR; INTRAVENOUS EVERY 6 HOURS PRN
Status: DISCONTINUED | OUTPATIENT
Start: 2024-04-09 | End: 2024-04-13 | Stop reason: HOSPADM

## 2024-04-09 RX ORDER — PRIMIDONE 250 MG/1
125 TABLET ORAL 2 TIMES DAILY
Status: DISCONTINUED | OUTPATIENT
Start: 2024-04-10 | End: 2024-04-13 | Stop reason: HOSPADM

## 2024-04-09 RX ORDER — POTASSIUM CHLORIDE 7.45 MG/ML
10 INJECTION INTRAVENOUS PRN
Status: DISCONTINUED | OUTPATIENT
Start: 2024-04-09 | End: 2024-04-13 | Stop reason: HOSPADM

## 2024-04-09 RX ORDER — PRIMIDONE 50 MG/1
150 TABLET ORAL 2 TIMES DAILY
Status: DISCONTINUED | OUTPATIENT
Start: 2024-04-10 | End: 2024-04-09

## 2024-04-09 RX ORDER — POTASSIUM CHLORIDE 20 MEQ/1
40 TABLET, EXTENDED RELEASE ORAL PRN
Status: DISCONTINUED | OUTPATIENT
Start: 2024-04-09 | End: 2024-04-13 | Stop reason: HOSPADM

## 2024-04-09 RX ORDER — M-VIT,TX,IRON,MINS/CALC/FOLIC 27MG-0.4MG
1 TABLET ORAL DAILY
Status: DISCONTINUED | OUTPATIENT
Start: 2024-04-10 | End: 2024-04-13 | Stop reason: HOSPADM

## 2024-04-09 RX ORDER — LEVOTHYROXINE SODIUM 0.1 MG/1
100 TABLET ORAL DAILY
Status: DISCONTINUED | OUTPATIENT
Start: 2024-04-10 | End: 2024-04-13 | Stop reason: HOSPADM

## 2024-04-09 RX ORDER — MIRTAZAPINE 15 MG/1
15 TABLET, FILM COATED ORAL NIGHTLY
Status: DISCONTINUED | OUTPATIENT
Start: 2024-04-09 | End: 2024-04-13 | Stop reason: HOSPADM

## 2024-04-09 RX ORDER — ONDANSETRON 2 MG/ML
4 INJECTION INTRAMUSCULAR; INTRAVENOUS ONCE
Status: COMPLETED | OUTPATIENT
Start: 2024-04-09 | End: 2024-04-09

## 2024-04-09 RX ORDER — ASPIRIN 81 MG/1
81 TABLET ORAL DAILY
Status: DISCONTINUED | OUTPATIENT
Start: 2024-04-10 | End: 2024-04-13 | Stop reason: HOSPADM

## 2024-04-09 RX ORDER — ACETAMINOPHEN 325 MG/1
650 TABLET ORAL EVERY 6 HOURS PRN
Status: DISCONTINUED | OUTPATIENT
Start: 2024-04-09 | End: 2024-04-13 | Stop reason: HOSPADM

## 2024-04-09 RX ORDER — ONDANSETRON 2 MG/ML
4 INJECTION INTRAMUSCULAR; INTRAVENOUS EVERY 6 HOURS PRN
Status: DISCONTINUED | OUTPATIENT
Start: 2024-04-09 | End: 2024-04-13 | Stop reason: HOSPADM

## 2024-04-09 RX ORDER — SODIUM CHLORIDE 0.9 % (FLUSH) 0.9 %
5-40 SYRINGE (ML) INJECTION EVERY 12 HOURS SCHEDULED
Status: DISCONTINUED | OUTPATIENT
Start: 2024-04-09 | End: 2024-04-13 | Stop reason: HOSPADM

## 2024-04-09 RX ORDER — BENZONATATE 100 MG/1
100 CAPSULE ORAL 3 TIMES DAILY PRN
Status: DISCONTINUED | OUTPATIENT
Start: 2024-04-09 | End: 2024-04-13 | Stop reason: HOSPADM

## 2024-04-09 RX ORDER — MEGESTROL ACETATE 20 MG/1
20 TABLET ORAL 3 TIMES DAILY
Status: DISCONTINUED | OUTPATIENT
Start: 2024-04-10 | End: 2024-04-13 | Stop reason: HOSPADM

## 2024-04-09 RX ORDER — PRIMIDONE 250 MG/1
125 TABLET ORAL 2 TIMES DAILY
COMMUNITY

## 2024-04-09 RX ORDER — PANTOPRAZOLE SODIUM 40 MG/10ML
40 INJECTION, POWDER, LYOPHILIZED, FOR SOLUTION INTRAVENOUS 2 TIMES DAILY
Status: DISCONTINUED | OUTPATIENT
Start: 2024-04-10 | End: 2024-04-13 | Stop reason: HOSPADM

## 2024-04-09 RX ORDER — PANTOPRAZOLE SODIUM 40 MG/10ML
40 INJECTION, POWDER, LYOPHILIZED, FOR SOLUTION INTRAVENOUS ONCE
Status: COMPLETED | OUTPATIENT
Start: 2024-04-09 | End: 2024-04-09

## 2024-04-09 RX ORDER — SODIUM CHLORIDE 0.9 % (FLUSH) 0.9 %
5-40 SYRINGE (ML) INJECTION PRN
Status: DISCONTINUED | OUTPATIENT
Start: 2024-04-09 | End: 2024-04-13 | Stop reason: HOSPADM

## 2024-04-09 RX ORDER — SODIUM CHLORIDE 9 MG/ML
INJECTION, SOLUTION INTRAVENOUS CONTINUOUS
Status: DISCONTINUED | OUTPATIENT
Start: 2024-04-09 | End: 2024-04-13

## 2024-04-09 RX ORDER — TOPIRAMATE 25 MG/1
25 TABLET ORAL 2 TIMES DAILY
Status: DISCONTINUED | OUTPATIENT
Start: 2024-04-10 | End: 2024-04-09

## 2024-04-09 RX ORDER — BISACODYL 10 MG
10 SUPPOSITORY, RECTAL RECTAL EVERY 24 HOURS
Status: DISCONTINUED | OUTPATIENT
Start: 2024-04-10 | End: 2024-04-13 | Stop reason: HOSPADM

## 2024-04-09 RX ORDER — MAGNESIUM SULFATE IN WATER 40 MG/ML
2000 INJECTION, SOLUTION INTRAVENOUS PRN
Status: DISCONTINUED | OUTPATIENT
Start: 2024-04-09 | End: 2024-04-13 | Stop reason: HOSPADM

## 2024-04-09 RX ORDER — LACTOBACILLUS RHAMNOSUS GG 10B CELL
1 CAPSULE ORAL DAILY
Status: DISCONTINUED | OUTPATIENT
Start: 2024-04-10 | End: 2024-04-13 | Stop reason: HOSPADM

## 2024-04-09 RX ORDER — ONDANSETRON 4 MG/1
4 TABLET, ORALLY DISINTEGRATING ORAL EVERY 8 HOURS PRN
Status: DISCONTINUED | OUTPATIENT
Start: 2024-04-09 | End: 2024-04-13 | Stop reason: HOSPADM

## 2024-04-09 RX ORDER — DOCUSATE SODIUM 100 MG/1
100 CAPSULE, LIQUID FILLED ORAL DAILY
Status: DISCONTINUED | OUTPATIENT
Start: 2024-04-10 | End: 2024-04-13 | Stop reason: HOSPADM

## 2024-04-09 RX ORDER — ROSUVASTATIN CALCIUM 5 MG/1
10 TABLET, COATED ORAL NIGHTLY
Status: DISCONTINUED | OUTPATIENT
Start: 2024-04-10 | End: 2024-04-13 | Stop reason: HOSPADM

## 2024-04-09 RX ORDER — CALCIUM CARBONATE 500 MG/1
500 TABLET, CHEWABLE ORAL 3 TIMES DAILY PRN
Status: DISCONTINUED | OUTPATIENT
Start: 2024-04-09 | End: 2024-04-13 | Stop reason: HOSPADM

## 2024-04-09 RX ORDER — LANOLIN ALCOHOL/MO/W.PET/CERES
3 CREAM (GRAM) TOPICAL NIGHTLY PRN
Status: DISCONTINUED | OUTPATIENT
Start: 2024-04-09 | End: 2024-04-13 | Stop reason: HOSPADM

## 2024-04-09 RX ORDER — POLYETHYLENE GLYCOL 3350 17 G/17G
17 POWDER, FOR SOLUTION ORAL DAILY PRN
Status: DISCONTINUED | OUTPATIENT
Start: 2024-04-09 | End: 2024-04-13 | Stop reason: HOSPADM

## 2024-04-09 RX ADMIN — ONDANSETRON 4 MG: 2 INJECTION INTRAMUSCULAR; INTRAVENOUS at 18:33

## 2024-04-09 RX ADMIN — ACETAMINOPHEN 650 MG: 325 TABLET ORAL at 23:12

## 2024-04-09 RX ADMIN — PANTOPRAZOLE SODIUM 40 MG: 40 INJECTION, POWDER, FOR SOLUTION INTRAVENOUS at 18:33

## 2024-04-09 ASSESSMENT — PAIN - FUNCTIONAL ASSESSMENT: PAIN_FUNCTIONAL_ASSESSMENT: NONE - DENIES PAIN

## 2024-04-09 NOTE — ED PROVIDER NOTES
HPI:  4/9/24,   Time: 5:09 PM EDT       Rikki García is a 73 y.o. male presenting to the ED for hematemesis, beginning hrs ago.  The complaint has been persistent, moderate in severity, and worsened by nothing.  Brought in by EMS from nursing home.  Upper abdominal pain and hematemesis nursing home history of GI for the past.  Patient is DNR     Review of Systems:   Pertinent positives and negatives are stated within HPI, all other systems reviewed and are negative.          --------------------------------------------- PAST HISTORY ---------------------------------------------  Past Medical History:  has a past medical history of A-fib (HCC), Cognitive impairment, Cystoma, Heart failure (HCC), Hyperlipidemia, and Hypothyroid.    Past Surgical History:  has a past surgical history that includes Cholecystectomy.    Social History:  reports that he has never smoked. He has never used smokeless tobacco. He reports that he does not currently use alcohol. He reports that he does not use drugs.    Family History: Family history is unknown by patient.     The patient’s home medications have been reviewed.    Allergies: Patient has no known allergies.        ---------------------------------------------------PHYSICAL EXAM--------------------------------------    Constitutional/General: Alert and oriented x3, pale, chronically ill appearing  Head: Normocephalic and atraumatic  Eyes: PERRL, EOMI, conjunctive normal, sclera non icteric  Mouth: Oropharynx clear, handling secretions,  Neck: Supple, full ROM,   Respiratory: Lungs clear to auscultation bilaterally, no wheezes, rales, or rhonchi. Not in respiratory distress  Cardiovascular:  Regular rate. Regular rhythm 2+ distal pulses    GI:  Abdomen Soft, mild epigastrum tender, Non distended.  . No organomegaly, no palpable masses,  No rebound, guarding, or rigidity.   Musculoskeletal: Moves all extremities x 4. Warm and well perfused,   Integument: skin warm and dry. No

## 2024-04-09 NOTE — H&P
Inpatient H&P      PCP:  Jose Antonio Woods MD  Admitting Physician:  Amparo Ross DO  Consultants:  None at this time  Chief Complaint:    Chief Complaint   Patient presents with    Emesis     Patient sent from NH for coffee ground emesis and abdominal pain        History of Present Illness  Rikki García is a 73 y.o. male who presents to [facility] ER complaining of coffee ground emesis.    Rikki García has a past medical history that includes hyperlipidemia, hypothyroidism, A-fib    Rikki presents to the ER with coffee-ground emesis and abdominal pain.  He was brought in by EMS from nursing home.  Abdominal pain was located in epigastric area.  Nursing home states he has had hematemesis.  Patient is a DNR.  ER spoke with family who states they are okay with blood transfusions, but no procedures. OK for NG if needed. Ct abdomen pelvis pending. He does have history of bowel obstructions. He has had no further episodes of vomiting since arrival to ER. Family at bedside aids with history.   Discussed patient's case with ED physician.    ER Course  Upon presentation to the ER, routine labwork was performed which revealed potassium 5.1, glucose 120, hemoglobin 8.3, platelets 460.  Imaging results are as outlined below in the Imaging section of this note.    Upon arrival to the ER, patient was 131/70.  The patient received zofran, protonix in the emergency room and was admitted to [facility].    Last Hospital Admission -I personally reviewed admission from 3/9/2022  SBO  Hypokalemia  Afib  Nausea vomiting    Last Echocardiogram -   None in EMR     ED TRIAGE VITALS  BP: 131/70, Temp: 97.8 °F (36.6 °C), Pulse: 97, Respirations: 18, SpO2: 98 %    Vitals:    04/09/24 1656   BP: 131/70   Pulse: 97   Resp: 18   Temp: 97.8 °F (36.6 °C)   SpO2: 98%         Histories  Past Medical History:   Diagnosis Date    A-fib (HCC)     Cognitive impairment     Cystoma     Heart failure (HCC)     Hyperlipidemia     Hypothyroid   RBC 3.73 (L) 3.80 - 5.80 m/uL    Hemoglobin 8.3 (L) 12.5 - 16.5 g/dL    Hematocrit 27.6 (L) 37.0 - 54.0 %    MCV 74.0 (L) 80.0 - 99.9 fL    MCH 22.3 (L) 26.0 - 35.0 pg    MCHC 30.1 (L) 32.0 - 34.5 g/dL    RDW 16.6 (H) 11.5 - 15.0 %    Platelets 463 (H) 130 - 450 k/uL    MPV 10.5 7.0 - 12.0 fL    Neutrophils % 77 43.0 - 80.0 %    Lymphocytes % 13 (L) 20.0 - 42.0 %    Monocytes % 7 2.0 - 12.0 %    Eosinophils % 2 0 - 6 %    Basophils % 1 0.0 - 2.0 %    Immature Granulocytes % 0 0.0 - 5.0 %    Neutrophils Absolute 8.31 (H) 1.80 - 7.30 k/uL    Lymphocytes Absolute 1.38 (L) 1.50 - 4.00 k/uL    Monocytes Absolute 0.76 0.10 - 0.95 k/uL    Eosinophils Absolute 0.18 0.05 - 0.50 k/uL    Basophils Absolute 0.06 0.00 - 0.20 k/uL    Immature Granulocytes Absolute 0.04 0.00 - 0.58 k/uL   CMP    Collection Time: 04/09/24  6:26 PM   Result Value Ref Range    Sodium 139 132 - 146 mmol/L    Potassium 5.1 (H) 3.5 - 5.0 mmol/L    Chloride 105 98 - 107 mmol/L    CO2 20 (L) 22 - 29 mmol/L    Anion Gap 14 7 - 16 mmol/L    Glucose 120 (H) 74 - 99 mg/dL    BUN 24 (H) 6 - 23 mg/dL    Creatinine 0.7 0.70 - 1.20 mg/dL    Est, Glom Filt Rate >90 >60 mL/min/1.73m2    Calcium 9.5 8.6 - 10.2 mg/dL    Total Protein 7.1 6.4 - 8.3 g/dL    Albumin 3.7 3.5 - 5.2 g/dL    Total Bilirubin 0.2 0.0 - 1.2 mg/dL    Alkaline Phosphatase 49 40 - 129 U/L    ALT 20 0 - 40 U/L    AST 44 (H) 0 - 39 U/L   Protime-INR    Collection Time: 04/09/24  6:26 PM   Result Value Ref Range    Protime 13.2 (H) 9.3 - 12.4 sec    INR 1.2    Magnesium    Collection Time: 04/09/24  6:26 PM   Result Value Ref Range    Magnesium 2.2 1.6 - 2.6 mg/dL   TYPE AND SCREEN    Collection Time: 04/09/24  6:26 PM   Result Value Ref Range    Blood Bank Sample Expiration 04/12/2024,5558     Arm Band Number [id number]     ABO/Rh O POSITIVE     Antibody Screen NEGATIVE        Imaging  No results found.        Assessment and Plan  Patient is a 73 y.o. male who presented with coffee ground emesis.

## 2024-04-10 ENCOUNTER — APPOINTMENT (OUTPATIENT)
Dept: CT IMAGING | Age: 74
DRG: 378 | End: 2024-04-10
Attending: INTERNAL MEDICINE
Payer: MEDICARE

## 2024-04-10 LAB
ALBUMIN SERPL-MCNC: 3.6 G/DL (ref 3.5–5.2)
ALP SERPL-CCNC: 45 U/L (ref 40–129)
ALT SERPL-CCNC: 16 U/L (ref 0–40)
ANION GAP SERPL CALCULATED.3IONS-SCNC: 11 MMOL/L (ref 7–16)
AST SERPL-CCNC: 17 U/L (ref 0–39)
BILIRUB SERPL-MCNC: 0.3 MG/DL (ref 0–1.2)
BUN SERPL-MCNC: 23 MG/DL (ref 6–23)
CALCIUM SERPL-MCNC: 8.7 MG/DL (ref 8.6–10.2)
CHLORIDE SERPL-SCNC: 106 MMOL/L (ref 98–107)
CHOLEST SERPL-MCNC: 127 MG/DL
CO2 SERPL-SCNC: 22 MMOL/L (ref 22–29)
CREAT SERPL-MCNC: 0.9 MG/DL (ref 0.7–1.2)
EKG ATRIAL RATE: 80 BPM
EKG P AXIS: 60 DEGREES
EKG P-R INTERVAL: 166 MS
EKG Q-T INTERVAL: 404 MS
EKG QRS DURATION: 78 MS
EKG QTC CALCULATION (BAZETT): 465 MS
EKG R AXIS: 74 DEGREES
EKG T AXIS: 47 DEGREES
EKG VENTRICULAR RATE: 80 BPM
ERYTHROCYTE [DISTWIDTH] IN BLOOD BY AUTOMATED COUNT: 16.2 % (ref 11.5–15)
GFR SERPL CREATININE-BSD FRML MDRD: >90 ML/MIN/1.73M2
GLUCOSE SERPL-MCNC: 91 MG/DL (ref 74–99)
HBA1C MFR BLD: 5.5 % (ref 4–5.6)
HCT VFR BLD AUTO: 20.4 % (ref 37–54)
HCT VFR BLD AUTO: 23.8 % (ref 37–54)
HCT VFR BLD AUTO: 25 % (ref 37–54)
HCT VFR BLD AUTO: 25.7 % (ref 37–54)
HDLC SERPL-MCNC: 25 MG/DL
HGB BLD-MCNC: 6 G/DL (ref 12.5–16.5)
HGB BLD-MCNC: 7.4 G/DL (ref 12.5–16.5)
HGB BLD-MCNC: 7.4 G/DL (ref 12.5–16.5)
HGB BLD-MCNC: 7.8 G/DL (ref 12.5–16.5)
LDLC SERPL CALC-MCNC: 86 MG/DL
MAGNESIUM SERPL-MCNC: 2.3 MG/DL (ref 1.6–2.6)
MCH RBC QN AUTO: 22.2 PG (ref 26–35)
MCHC RBC AUTO-ENTMCNC: 29.6 G/DL (ref 32–34.5)
MCV RBC AUTO: 74.9 FL (ref 80–99.9)
PHOSPHATE SERPL-MCNC: 3.7 MG/DL (ref 2.5–4.5)
PLATELET # BLD AUTO: 368 K/UL (ref 130–450)
PMV BLD AUTO: 10.4 FL (ref 7–12)
POTASSIUM SERPL-SCNC: 3.9 MMOL/L (ref 3.5–5)
PROT SERPL-MCNC: 6.4 G/DL (ref 6.4–8.3)
RBC # BLD AUTO: 3.34 M/UL (ref 3.8–5.8)
SODIUM SERPL-SCNC: 139 MMOL/L (ref 132–146)
TRIGL SERPL-MCNC: 79 MG/DL
TSH SERPL DL<=0.05 MIU/L-ACNC: 2.45 UIU/ML (ref 0.27–4.2)
VLDLC SERPL CALC-MCNC: 16 MG/DL
WBC OTHER # BLD: 6.8 K/UL (ref 4.5–11.5)

## 2024-04-10 PROCEDURE — 80053 COMPREHEN METABOLIC PANEL: CPT

## 2024-04-10 PROCEDURE — 1200000000 HC SEMI PRIVATE

## 2024-04-10 PROCEDURE — 2580000003 HC RX 258: Performed by: INTERNAL MEDICINE

## 2024-04-10 PROCEDURE — 74176 CT ABD & PELVIS W/O CONTRAST: CPT

## 2024-04-10 PROCEDURE — C9113 INJ PANTOPRAZOLE SODIUM, VIA: HCPCS | Performed by: INTERNAL MEDICINE

## 2024-04-10 PROCEDURE — 6370000000 HC RX 637 (ALT 250 FOR IP): Performed by: INTERNAL MEDICINE

## 2024-04-10 PROCEDURE — 36415 COLL VENOUS BLD VENIPUNCTURE: CPT

## 2024-04-10 PROCEDURE — 85018 HEMOGLOBIN: CPT

## 2024-04-10 PROCEDURE — 6360000002 HC RX W HCPCS: Performed by: INTERNAL MEDICINE

## 2024-04-10 PROCEDURE — 85014 HEMATOCRIT: CPT

## 2024-04-10 PROCEDURE — 99222 1ST HOSP IP/OBS MODERATE 55: CPT | Performed by: STUDENT IN AN ORGANIZED HEALTH CARE EDUCATION/TRAINING PROGRAM

## 2024-04-10 PROCEDURE — 93010 ELECTROCARDIOGRAM REPORT: CPT | Performed by: INTERNAL MEDICINE

## 2024-04-10 PROCEDURE — 99232 SBSQ HOSP IP/OBS MODERATE 35: CPT | Performed by: STUDENT IN AN ORGANIZED HEALTH CARE EDUCATION/TRAINING PROGRAM

## 2024-04-10 PROCEDURE — 84443 ASSAY THYROID STIM HORMONE: CPT

## 2024-04-10 PROCEDURE — 83735 ASSAY OF MAGNESIUM: CPT

## 2024-04-10 PROCEDURE — 84100 ASSAY OF PHOSPHORUS: CPT

## 2024-04-10 PROCEDURE — 83036 HEMOGLOBIN GLYCOSYLATED A1C: CPT

## 2024-04-10 PROCEDURE — 85027 COMPLETE CBC AUTOMATED: CPT

## 2024-04-10 PROCEDURE — 80061 LIPID PANEL: CPT

## 2024-04-10 PROCEDURE — 99222 1ST HOSP IP/OBS MODERATE 55: CPT | Performed by: NURSE PRACTITIONER

## 2024-04-10 RX ORDER — MAGNESIUM HYDROXIDE/ALUMINUM HYDROXICE/SIMETHICONE 120; 1200; 1200 MG/30ML; MG/30ML; MG/30ML
20 SUSPENSION ORAL EVERY 4 HOURS PRN
COMMUNITY

## 2024-04-10 RX ORDER — LEVOTHYROXINE SODIUM 0.1 MG/1
100 TABLET ORAL DAILY
COMMUNITY

## 2024-04-10 RX ORDER — SODIUM CHLORIDE 9 MG/ML
INJECTION, SOLUTION INTRAVENOUS PRN
Status: DISCONTINUED | OUTPATIENT
Start: 2024-04-10 | End: 2024-04-10

## 2024-04-10 RX ORDER — SUCRALFATE 1 G/1
1 TABLET ORAL EVERY 6 HOURS SCHEDULED
Status: DISCONTINUED | OUTPATIENT
Start: 2024-04-10 | End: 2024-04-12

## 2024-04-10 RX ADMIN — SODIUM CHLORIDE: 9 INJECTION, SOLUTION INTRAVENOUS at 02:15

## 2024-04-10 RX ADMIN — PANTOPRAZOLE SODIUM 40 MG: 40 INJECTION, POWDER, FOR SOLUTION INTRAVENOUS at 05:44

## 2024-04-10 RX ADMIN — PRIMIDONE 125 MG: 250 TABLET ORAL at 23:10

## 2024-04-10 RX ADMIN — BISACODYL 10 MG: 10 SUPPOSITORY RECTAL at 23:11

## 2024-04-10 RX ADMIN — SUCRALFATE 1 G: 1 TABLET ORAL at 23:15

## 2024-04-10 RX ADMIN — SODIUM CHLORIDE, PRESERVATIVE FREE 10 ML: 5 INJECTION INTRAVENOUS at 02:15

## 2024-04-10 RX ADMIN — MIRTAZAPINE 15 MG: 15 TABLET, FILM COATED ORAL at 21:46

## 2024-04-10 RX ADMIN — PANTOPRAZOLE SODIUM 40 MG: 40 INJECTION, POWDER, FOR SOLUTION INTRAVENOUS at 16:36

## 2024-04-10 RX ADMIN — BISACODYL 10 MG: 10 SUPPOSITORY RECTAL at 02:15

## 2024-04-10 RX ADMIN — ROSUVASTATIN CALCIUM 10 MG: 5 TABLET, COATED ORAL at 21:46

## 2024-04-10 RX ADMIN — MEGESTROL ACETATE 20 MG: 20 TABLET ORAL at 23:10

## 2024-04-10 NOTE — PROGRESS NOTES
Hospitalist Progress Note      SYNOPSIS: Patient admitted on 2024   Rikki García has a past medical history that includes hyperlipidemia, hypothyroidism, A-fib was sent to ED from nursing home with complaints of coffee-ground emesis and abdominal pain.  Patient is a DNR, family stated they wanted blood transfusions and medications but refused surgeries.  CT scan of abdomen and pelvis without contrast was done no small bowel obstruction noted.  4/10: hemoglobin 6, 1 unit PRBC ordered, GI consult placed    SUBJECTIVE:    Patient seen and examined at bedside, not in acute distress  Records reviewed.   Stable overnight. No overnight issues reported    Temp (24hrs), Av.6 °F (37 °C), Min:97.8 °F (36.6 °C), Max:99.3 °F (37.4 °C)    DIET: Diet NPO  CODE: DNR-CCA  No intake or output data in the 24 hours ending 04/10/24 9255    OBJECTIVE:    /60   Pulse 88   Temp 99.3 °F (37.4 °C) (Temporal)   Resp 18   Ht 1.575 m (5' 2\")   Wt 51.7 kg (114 lb)   SpO2 98%   BMI 20.85 kg/m²     General appearance: No apparent distress, appears stated age and cooperative.  HEENT:  Normocephalic. Conjunctivae/corneas clear. Moist mucosa   Neck: Supple. No jugular venous distention. Thyroid not visible, non tender   Respiratory: Normal respiratory effort. Clear to auscultation bilaterally. No stridor/wheezing/rhonchi/crackles   Cardiovascular: Regular heart beats, normal S1-S2. No M/G/R  Abdomen: Non distended, soft, non tender, no visceromegaly, no mass, normal bowel sounds   Musculoskeletal: No clubbing, cyanosis, no bilateral lower extremity edema. Brisk capillary refill.   Skin:  No rashes  on visible skin  Neurologic: awake, alert  Following commands. No focal neurological deficit. Cranial nerves 2-12 grossly normal      ASSESSMENT and PLAN:    GIB- started on protonix. H&H q 6, Transfuse for hemoglobin <7.  Hemoglobin was 6, 1 unit PRBC transfusion, Protonix 40 Mg twice daily, Carafate 1 g every 6 hours GI  consulted, patient is agreeable to EGD on Friday, patient will be n.p.o. Thursday evening, no SBO on CT previous history of SBO 3/2022  Acute on chronic anemia- due to above. Transfuse for hemoglobin <7  Atrial fibrillation- on BB. Hold any anticoagulation.  Hyperlipidemia- check lipid panel.   Hypothyroidism- check TSH.  Continue Synthroid.     DVT prophylaxis SCDs    DISPOSITION: [facility] when medically stable    Medications:  REVIEWED DAILY    Infusion Medications    sodium chloride      sodium chloride      sodium chloride 75 mL/hr at 04/10/24 0215     Scheduled Medications    sucralfate  1 g Oral 4 times per day    sodium chloride flush  5-40 mL IntraVENous 2 times per day    [Held by provider] aspirin  81 mg Oral Daily    bisacodyl  10 mg Rectal Q24H    docusate sodium  100 mg Oral Daily    lactobacillus  1 capsule Oral Daily    levothyroxine  100 mcg Oral Daily    cetirizine  10 mg Oral Daily    megestrol  20 mg Oral TID    mirtazapine  15 mg Oral Nightly    therapeutic multivitamin-minerals  1 tablet Oral Daily    rosuvastatin  10 mg Oral Nightly    pantoprazole  40 mg IntraVENous BID    primidone  125 mg Oral BID     PRN Meds: sodium chloride, benzocaine-menthol, benzonatate, calcium carbonate, hydrALAZINE, melatonin, Polyvinyl Alcohol-Povidone PF, sodium chloride, sodium chloride flush, sodium chloride, potassium chloride **OR** potassium alternative oral replacement **OR** potassium chloride, magnesium sulfate, ondansetron **OR** ondansetron, polyethylene glycol, acetaminophen **OR** acetaminophen    Labs:     Recent Labs     04/09/24  1826 04/10/24  0203 04/10/24  0810 04/10/24  1515   WBC 10.7  --  6.8  --    HGB 8.3* 7.4* 7.4* 6.0*   HCT 27.6* 23.8* 25.0* 20.4*   *  --  368  --        Recent Labs     04/09/24  1826 04/10/24  0810    139   K 5.1* 3.9    106   CO2 20* 22   BUN 24* 23   CREATININE 0.7 0.9   CALCIUM 9.5 8.7   PHOS  --  3.7       Recent Labs     04/09/24 1826

## 2024-04-10 NOTE — PROGRESS NOTES
4 Eyes Skin Assessment     NAME:  Rikki García  YOB: 1950  MEDICAL RECORD NUMBER:  [id number]    The patient is being assessed for  Admission    I agree that at least one RN has performed a thorough Head to Toe Skin Assessment on the patient. ALL assessment sites listed below have been assessed.      Areas assessed by both nurses:    Head, Face, Ears, Shoulders, Back, Chest, Arms, Elbows, Hands, Sacrum. Buttock, Coccyx, Ischium, and Legs. Feet and Heels        Does the Patient have a Wound? No noted wound(s)       Chris Prevention initiated by RN: Yes  Wound Care Orders initiated by RN: No    Pressure Injury (Stage 3,4, Unstageable, DTI, NWPT, and Complex wounds) if present, place Wound referral order by RN under : No    New Ostomies, if present place, Ostomy referral order under : No     Nurse 1 eSignature: Electronically signed by Joann Pierce RN on 4/10/24 at 5:46 PM EDT    **SHARE this note so that the co-signing nurse can place an eSignature**    Nurse 2 eSignature: Electronically signed by Lyndsay Gudino RN on 4/10/24 at 5:46 PM EDT

## 2024-04-10 NOTE — CONSULTS
[facility]   Gastroenterology, Hepatology, &  Advanced Endoscopy    Consult     Reason for consult: Coffee ground from NH  ASSESSMENT AND PLAN:    - EGD Friday  - NPO Thursday evening    Pt states he had 2 episodes of hematemesis, no further episodes noted.  -Denies abdominal pain and states he is feeling better.  - History of esophageal cancer with XRT approx 10 years ago.     - History GERD/gastritis peptic ulcer.    - Spoke with Shelbie GUTIERREZ. She is agreeable to the EGD this Friday, just no surgery.   -agreeable blood transfusion needed.   - Hgb 7.4  - No SBO on CT., previous SBO 3/2022  - Protonix 40 mg IV BID  - Carafate 1 g q 6  - Monitor for further episodes      HISTORY OF PRESENT ILLNESS:      Pt presents to the ED secondary to hematemesis and abdominal pain from [facility].  Per facility paperwork, pt is a DNRCC. Pt is admitted inpatient with gastrointestinal bleeding. The patient received zofran, protonix in the emergency room       4/10/24 CT A/P wo IV contrast  FINDINGS:  Lower Chest: Chronic changes seen in the lung bases bilaterally.     Organs: Liver is homogeneous in appearance.  Gallbladder is been surgically  removed.  Mild extrahepatic biliary ductal dilatation which can be seen in a post cholecystectomy patient.  Low-attenuation structure seen within the right lobe of the liver which may suggest a small cyst.  Pancreas is homogeneous in appearance.  Spleen is unremarkable.  Both adrenal glands are within normal limits.  Symmetric appearance of the kidneys.  Simple renal cortical cyst identified in the right kidney in which no further follow-up is  recommended.  No stones or distension seen in the renal collecting system.     GI/Bowel: Moderate size hiatal hernia.  Stomach is unremarkable in appearance.  No significant wall thickening identified of the stomach.  There is decompression.  Diverticulum identified off the 2nd portion of the  duodenum.  Small bowel is  Patient denies SOB, wheezing, productive cough.  Cards: Patient denies CP, palpitations, significant edema  GI: As above.  Derm: Patient denies jaundice/rashes.   Musc: Patient denies diffuse/irregular joint swelling or myalgias.        Recent Labs     04/09/24  1826 04/10/24  0810   INR 1.2  --    ALT 20 16   AST 44* 17   ALKPHOS 49 45   BILITOT 0.2 0.3   LABALBU 3.7 3.6       Lab Results   Component Value Date    WBC 6.8 04/10/2024    HGB 7.4 (L) 04/10/2024    HCT 25.0 (L) 04/10/2024     04/10/2024     04/10/2024    K 3.9 04/10/2024     04/10/2024    CREATININE 0.9 04/10/2024    BUN 23 04/10/2024    CO2 22 04/10/2024    GLUCOSE 91 04/10/2024    INR 1.2 04/09/2024    APTT 40.6 (H) 03/14/2022    TSH 2.45 04/10/2024    LABA1C 5.5 04/10/2024         PHYSICAL EXAM:  /70   Pulse 95   Temp 98.6 °F (37 °C) (Oral)   Resp 18   SpO2 97%   Physical Exam:  General: Chronically ill-appearing, NAD  HEENT: PERRLA, EOMI, Anicteric sclera, MMM, no rhinorrhea  Cards: RRR, no LE edema  Resp: Breathing comfortably on room air, good air movement, no use of accessory muscles, no audible wheezing  Abdomen: soft, NT, mild distention  Extremities: Moves all extremities, no effusions or bruising.  Skin: No rashes or jaundice. Pale.   Neuro: CN grossly intact, non-focal exam, answers questions appropriately.

## 2024-04-10 NOTE — PROGRESS NOTES
Sitter requested for patient due to patient spontaneously jumping out bed. Patient's hemoglobin is currently a 6.0. Was informed there is no staff currently to help by staffing advisor brittney. Will place a call to clinical supervisor for assistance with the issue

## 2024-04-10 NOTE — ED NOTES
Nurse to nurse called to 8S. Accepting RN updated on pt plan and condition. Pt to be transported to accepting unit. This RN will continue to closely monitor pt.

## 2024-04-10 NOTE — ED NOTES
This RN spoke with pt's ZULLYA Shelbie Gardner. ZULLYA updated on pt plan and condition. POA states that she would like to be updated if there are any changes to pt's care. Will follow up.

## 2024-04-10 NOTE — PLAN OF CARE
Problem: Safety - Adult  Goal: Free from fall injury  Outcome: Not Progressing     Problem: Safety - Adult  Goal: Free from fall injury  Outcome: Not Progressing

## 2024-04-10 NOTE — CARE COORDINATION
Social Work /Transition of Care:    Pt presents to the ED secondary to emesis and abdominal pain from [facility].  Per facility paperwork, pt is a DNRCC.  Pt is admitted inpatient with gastrointestinal bleeding.  Pt has a GI consult.    Per liaison at [facility], pt is a long term care bed hold and able to return upon discharge.    SW/CM to follow.

## 2024-04-11 LAB
ANION GAP SERPL CALCULATED.3IONS-SCNC: 11 MMOL/L (ref 7–16)
BUN SERPL-MCNC: 17 MG/DL (ref 6–23)
CALCIUM SERPL-MCNC: 8 MG/DL (ref 8.6–10.2)
CHLORIDE SERPL-SCNC: 109 MMOL/L (ref 98–107)
CO2 SERPL-SCNC: 16 MMOL/L (ref 22–29)
CREAT SERPL-MCNC: 0.7 MG/DL (ref 0.7–1.2)
GFR SERPL CREATININE-BSD FRML MDRD: >90 ML/MIN/1.73M2
GLUCOSE SERPL-MCNC: 82 MG/DL (ref 74–99)
HCT VFR BLD AUTO: 23.8 % (ref 37–54)
HCT VFR BLD AUTO: 26.6 % (ref 37–54)
HCT VFR BLD AUTO: 27.7 % (ref 37–54)
HGB BLD-MCNC: 7 G/DL (ref 12.5–16.5)
HGB BLD-MCNC: 7.9 G/DL (ref 12.5–16.5)
HGB BLD-MCNC: 8.2 G/DL (ref 12.5–16.5)
POTASSIUM SERPL-SCNC: 3.9 MMOL/L (ref 3.5–5)
SODIUM SERPL-SCNC: 136 MMOL/L (ref 132–146)

## 2024-04-11 PROCEDURE — 1200000000 HC SEMI PRIVATE

## 2024-04-11 PROCEDURE — 6360000002 HC RX W HCPCS: Performed by: INTERNAL MEDICINE

## 2024-04-11 PROCEDURE — 85014 HEMATOCRIT: CPT

## 2024-04-11 PROCEDURE — 6370000000 HC RX 637 (ALT 250 FOR IP): Performed by: INTERNAL MEDICINE

## 2024-04-11 PROCEDURE — 80048 BASIC METABOLIC PNL TOTAL CA: CPT

## 2024-04-11 PROCEDURE — C9113 INJ PANTOPRAZOLE SODIUM, VIA: HCPCS | Performed by: INTERNAL MEDICINE

## 2024-04-11 PROCEDURE — 36415 COLL VENOUS BLD VENIPUNCTURE: CPT

## 2024-04-11 PROCEDURE — 99232 SBSQ HOSP IP/OBS MODERATE 35: CPT | Performed by: STUDENT IN AN ORGANIZED HEALTH CARE EDUCATION/TRAINING PROGRAM

## 2024-04-11 PROCEDURE — 85018 HEMOGLOBIN: CPT

## 2024-04-11 PROCEDURE — 2580000003 HC RX 258: Performed by: INTERNAL MEDICINE

## 2024-04-11 PROCEDURE — 99232 SBSQ HOSP IP/OBS MODERATE 35: CPT | Performed by: NURSE PRACTITIONER

## 2024-04-11 RX ADMIN — Medication 1 TABLET: at 09:19

## 2024-04-11 RX ADMIN — MEGESTROL ACETATE 20 MG: 20 TABLET ORAL at 21:24

## 2024-04-11 RX ADMIN — SUCRALFATE 1 G: 1 TABLET ORAL at 17:14

## 2024-04-11 RX ADMIN — PANTOPRAZOLE SODIUM 40 MG: 40 INJECTION, POWDER, FOR SOLUTION INTRAVENOUS at 17:14

## 2024-04-11 RX ADMIN — SODIUM CHLORIDE: 9 INJECTION, SOLUTION INTRAVENOUS at 17:20

## 2024-04-11 RX ADMIN — ROSUVASTATIN CALCIUM 10 MG: 5 TABLET, COATED ORAL at 21:24

## 2024-04-11 RX ADMIN — PANTOPRAZOLE SODIUM 40 MG: 40 INJECTION, POWDER, FOR SOLUTION INTRAVENOUS at 06:56

## 2024-04-11 RX ADMIN — SUCRALFATE 1 G: 1 TABLET ORAL at 12:29

## 2024-04-11 RX ADMIN — MEGESTROL ACETATE 20 MG: 20 TABLET ORAL at 09:19

## 2024-04-11 RX ADMIN — LEVOTHYROXINE SODIUM 100 MCG: 0.1 TABLET ORAL at 06:56

## 2024-04-11 RX ADMIN — MEGESTROL ACETATE 20 MG: 20 TABLET ORAL at 17:13

## 2024-04-11 RX ADMIN — MIRTAZAPINE 15 MG: 15 TABLET, FILM COATED ORAL at 21:24

## 2024-04-11 RX ADMIN — Medication 1 CAPSULE: at 09:20

## 2024-04-11 RX ADMIN — SODIUM CHLORIDE, PRESERVATIVE FREE 10 ML: 5 INJECTION INTRAVENOUS at 09:20

## 2024-04-11 RX ADMIN — PRIMIDONE 125 MG: 250 TABLET ORAL at 09:21

## 2024-04-11 RX ADMIN — DOCUSATE SODIUM 100 MG: 100 CAPSULE, LIQUID FILLED ORAL at 09:19

## 2024-04-11 RX ADMIN — CETIRIZINE HYDROCHLORIDE 10 MG: 10 TABLET, FILM COATED ORAL at 09:19

## 2024-04-11 RX ADMIN — SUCRALFATE 1 G: 1 TABLET ORAL at 06:56

## 2024-04-11 RX ADMIN — SODIUM CHLORIDE, PRESERVATIVE FREE 10 ML: 5 INJECTION INTRAVENOUS at 21:25

## 2024-04-11 RX ADMIN — PRIMIDONE 125 MG: 250 TABLET ORAL at 21:24

## 2024-04-11 NOTE — PROGRESS NOTES
Hospitalist Progress Note      SYNOPSIS: Patient admitted on 2024   Rikki aGrcía has a past medical history that includes hyperlipidemia, hypothyroidism, A-fib was sent to ED from nursing home with complaints of coffee-ground emesis and abdominal pain.  Patient is a DNR, family stated they wanted blood transfusions and medications but refused surgeries.  CT scan of abdomen and pelvis without contrast was done no small bowel obstruction noted.  4/10: GI consult, hemoglobin 6, 1 unit PRBC ordered, repeat hemoglobin was 7.8, previous reading likely to be error  : No further emesis, dark stools, blood in stools noted, hemoglobin 7.9, started on clear liquid diet, plan for EGD on     SUBJECTIVE:    Patient seen and examined at bedside, not in acute distress  Records reviewed.   Stable overnight. No overnight issues reported    Temp (24hrs), Av °F (36.7 °C), Min:96.3 °F (35.7 °C), Max:99.3 °F (37.4 °C)    DIET: Diet NPO Exceptions are: Sips of Water with Meds  ADULT DIET; Clear Liquid; Please avoid food red of pink color  CODE: DNR-CCA    Intake/Output Summary (Last 24 hours) at 2024 1455  Last data filed at 2024 1415  Gross per 24 hour   Intake --   Output 200 ml   Net -200 ml       OBJECTIVE:    /82   Pulse 78   Temp (!) 96.3 °F (35.7 °C) (Temporal)   Resp 22   Ht 1.575 m (5' 2\")   Wt 51.7 kg (114 lb)   SpO2 99%   BMI 20.85 kg/m²     General appearance: No apparent distress, appears stated age and cooperative.  HEENT:  Normocephalic. Conjunctivae/corneas clear. Moist mucosa   Neck: Supple. No jugular venous distention. Thyroid not visible, non tender   Respiratory: Normal respiratory effort. Clear to auscultation bilaterally. No stridor/wheezing/rhonchi/crackles   Cardiovascular: Regular heart beats, normal S1-S2. No M/G/R  Abdomen: Non distended, soft, non tender, no visceromegaly, no mass, normal bowel sounds   Musculoskeletal: No clubbing, cyanosis, no bilateral lower

## 2024-04-11 NOTE — PROGRESS NOTES
[facility]   Gastroenterology, Hepatology, &  Advanced Endoscopy        Reason for consult: Coffee ground from NH  ASSESSMENT AND PLAN:    - EGD Friday  - NPO Thursday evening  - OK for Clears today    Pt states he had 2 episodes of hematemesis, no further episodes noted.  -Denies abdominal pain and states he is feeling better.  - History of esophageal cancer with XRT approx 10 years ago.     - History GERD/gastritis peptic ulcer.    - Spoke with Shelbie GUTIERREZ. She is agreeable to the EGD this Friday, just no surgery.   -agreeable blood transfusion needed.   - Hgb 7.4 -> 7.0  - No SBO on CT., previous SBO 3/2022  - Protonix 40 mg IV BID  - Carafate 1 g q 6  - Monitor for further episodes      HISTORY OF PRESENT ILLNESS:      Pt presents to the ED secondary to hematemesis and abdominal pain from [facility].  Per facility paperwork, pt is a DNRCC. Pt is admitted inpatient with gastrointestinal bleeding. The patient received zofran, protonix in the emergency room       4/10/24 CT A/P wo IV contrast  FINDINGS:  Lower Chest: Chronic changes seen in the lung bases bilaterally.     Organs: Liver is homogeneous in appearance.  Gallbladder is been surgically  removed.  Mild extrahepatic biliary ductal dilatation which can be seen in a post cholecystectomy patient.  Low-attenuation structure seen within the right lobe of the liver which may suggest a small cyst.  Pancreas is homogeneous in appearance.  Spleen is unremarkable.  Both adrenal glands are within normal limits.  Symmetric appearance of the kidneys.  Simple renal cortical cyst identified in the right kidney in which no further follow-up is  recommended.  No stones or distension seen in the renal collecting system.     GI/Bowel: Moderate size hiatal hernia.  Stomach is unremarkable in appearance.  No significant wall thickening identified of the stomach.  There is decompression.  Diverticulum identified off the 2nd portion of  (TYLENOL) tablet 650 mg, 650 mg, Oral, Q6H PRN, 650 mg at 04/09/24 2312 **OR** acetaminophen (TYLENOL) suppository 650 mg, 650 mg, Rectal, Q6H PRN, Amparo Ross, DO    0.9 % sodium chloride infusion, , IntraVENous, Continuous, Amparo Ross, DO, Last Rate: 75 mL/hr at 04/10/24 1649, Rate Verify at 04/10/24 1649    [Held by provider] aspirin EC tablet 81 mg, 81 mg, Oral, Daily, Amparo Ross M, DO    bisacodyl (DULCOLAX) suppository 10 mg, 10 mg, Rectal, Q24H, Amparo Ross, DO, 10 mg at 04/10/24 2311    docusate sodium (COLACE) capsule 100 mg, 100 mg, Oral, Daily, Amparo Ross M, DO, 100 mg at 04/11/24 0919    lactobacillus (CULTURELLE) capsule 1 capsule, 1 capsule, Oral, Daily, Amparo Ross, DO, 1 capsule at 04/11/24 0920    levothyroxine (SYNTHROID) tablet 100 mcg, 100 mcg, Oral, Daily, Amparo Ross, DO, 100 mcg at 04/11/24 0656    cetirizine (ZYRTEC) tablet 10 mg, 10 mg, Oral, Daily, Amparo Ross M, DO, 10 mg at 04/11/24 0919    megestrol (MEGACE) tablet 20 mg, 20 mg, Oral, TID, Amparo Ross, DO, 20 mg at 04/11/24 0919    mirtazapine (REMERON) tablet 15 mg, 15 mg, Oral, Nightly, Amparo Ross M, DO, 15 mg at 04/10/24 2146    therapeutic multivitamin-minerals 1 tablet, 1 tablet, Oral, Daily, Amparo Ross, DO, 1 tablet at 04/11/24 0919    rosuvastatin (CRESTOR) tablet 10 mg, 10 mg, Oral, Nightly, Amparo Ross M, DO, 10 mg at 04/10/24 2146    pantoprazole (PROTONIX) injection 40 mg, 40 mg, IntraVENous, BID, Amparo Ross DO, 40 mg at 04/11/24 0656    primidone (MYSOLINE) tablet 125 mg, 125 mg, Oral, BID, Amparo Ross DO, 125 mg at 04/11/24 0921     Allergies:  Patient has no known allergies.    ROS:  Aside from what was mentioned in the past medical history and history of present illness, essentially unremarkable, all others are negative.      Recent Labs     04/09/24  1826 04/10/24  0810   INR 1.2  --    ALT 20 16   AST 44* 17   ALKPHOS 49 45

## 2024-04-11 NOTE — CARE COORDINATION
4/11/24, Patient is a long term bed hold from [facility] per prior SW note.  Patient can return back to [facility]-no precert or 7000 is needed.  Call placed to [facility] due to patient being confused in room.  Confirmed with Shelbie that discharge plan will be to return back to [facility].  Ambulance form (will need completed on day of discharge), face sheet and envelope is on soft chart.   to follow.      Lourdes Cash GRISELDA  [facility] Case Management  [phone]

## 2024-04-12 ENCOUNTER — ANESTHESIA (OUTPATIENT)
Dept: ENDOSCOPY | Age: 74
End: 2024-04-12
Payer: MEDICARE

## 2024-04-12 ENCOUNTER — ANESTHESIA EVENT (OUTPATIENT)
Dept: ENDOSCOPY | Age: 74
End: 2024-04-12
Payer: MEDICARE

## 2024-04-12 PROBLEM — K92.1 MELENA: Status: ACTIVE | Noted: 2024-04-12

## 2024-04-12 PROBLEM — K92.0 HEMATEMESIS: Status: ACTIVE | Noted: 2024-04-09

## 2024-04-12 LAB
ANION GAP SERPL CALCULATED.3IONS-SCNC: 11 MMOL/L (ref 7–16)
BUN SERPL-MCNC: 12 MG/DL (ref 6–23)
CALCIUM SERPL-MCNC: 8 MG/DL (ref 8.6–10.2)
CHLORIDE SERPL-SCNC: 108 MMOL/L (ref 98–107)
CO2 SERPL-SCNC: 17 MMOL/L (ref 22–29)
CREAT SERPL-MCNC: 0.8 MG/DL (ref 0.7–1.2)
GFR SERPL CREATININE-BSD FRML MDRD: >90 ML/MIN/1.73M2
GLUCOSE SERPL-MCNC: 77 MG/DL (ref 74–99)
HCT VFR BLD AUTO: 24.6 % (ref 37–54)
HCT VFR BLD AUTO: 24.6 % (ref 37–54)
HCT VFR BLD AUTO: 30.2 % (ref 37–54)
HCT VFR BLD AUTO: 30.3 % (ref 37–54)
HGB BLD-MCNC: 7.4 G/DL (ref 12.5–16.5)
HGB BLD-MCNC: 7.4 G/DL (ref 12.5–16.5)
HGB BLD-MCNC: 9 G/DL (ref 12.5–16.5)
HGB BLD-MCNC: 9.1 G/DL (ref 12.5–16.5)
POTASSIUM SERPL-SCNC: 3.7 MMOL/L (ref 3.5–5)
SODIUM SERPL-SCNC: 136 MMOL/L (ref 132–146)

## 2024-04-12 PROCEDURE — 7100000000 HC PACU RECOVERY - FIRST 15 MIN: Performed by: STUDENT IN AN ORGANIZED HEALTH CARE EDUCATION/TRAINING PROGRAM

## 2024-04-12 PROCEDURE — 36415 COLL VENOUS BLD VENIPUNCTURE: CPT

## 2024-04-12 PROCEDURE — 6360000002 HC RX W HCPCS: Performed by: STUDENT IN AN ORGANIZED HEALTH CARE EDUCATION/TRAINING PROGRAM

## 2024-04-12 PROCEDURE — 85014 HEMATOCRIT: CPT

## 2024-04-12 PROCEDURE — C9113 INJ PANTOPRAZOLE SODIUM, VIA: HCPCS | Performed by: INTERNAL MEDICINE

## 2024-04-12 PROCEDURE — 3609017100 HC EGD: Performed by: STUDENT IN AN ORGANIZED HEALTH CARE EDUCATION/TRAINING PROGRAM

## 2024-04-12 PROCEDURE — 6360000002 HC RX W HCPCS: Performed by: INTERNAL MEDICINE

## 2024-04-12 PROCEDURE — 2709999900 HC NON-CHARGEABLE SUPPLY: Performed by: STUDENT IN AN ORGANIZED HEALTH CARE EDUCATION/TRAINING PROGRAM

## 2024-04-12 PROCEDURE — 6370000000 HC RX 637 (ALT 250 FOR IP): Performed by: STUDENT IN AN ORGANIZED HEALTH CARE EDUCATION/TRAINING PROGRAM

## 2024-04-12 PROCEDURE — 43235 EGD DIAGNOSTIC BRUSH WASH: CPT | Performed by: STUDENT IN AN ORGANIZED HEALTH CARE EDUCATION/TRAINING PROGRAM

## 2024-04-12 PROCEDURE — 3700000000 HC ANESTHESIA ATTENDED CARE: Performed by: STUDENT IN AN ORGANIZED HEALTH CARE EDUCATION/TRAINING PROGRAM

## 2024-04-12 PROCEDURE — 6370000000 HC RX 637 (ALT 250 FOR IP): Performed by: INTERNAL MEDICINE

## 2024-04-12 PROCEDURE — 2580000003 HC RX 258: Performed by: INTERNAL MEDICINE

## 2024-04-12 PROCEDURE — 2580000003 HC RX 258: Performed by: STUDENT IN AN ORGANIZED HEALTH CARE EDUCATION/TRAINING PROGRAM

## 2024-04-12 PROCEDURE — 6360000002 HC RX W HCPCS: Performed by: NURSE ANESTHETIST, CERTIFIED REGISTERED

## 2024-04-12 PROCEDURE — 2580000003 HC RX 258: Performed by: NURSE ANESTHETIST, CERTIFIED REGISTERED

## 2024-04-12 PROCEDURE — 85018 HEMOGLOBIN: CPT

## 2024-04-12 PROCEDURE — 3700000001 HC ADD 15 MINUTES (ANESTHESIA): Performed by: STUDENT IN AN ORGANIZED HEALTH CARE EDUCATION/TRAINING PROGRAM

## 2024-04-12 PROCEDURE — 0DJ08ZZ INSPECTION OF UPPER INTESTINAL TRACT, VIA NATURAL OR ARTIFICIAL OPENING ENDOSCOPIC: ICD-10-PCS | Performed by: STUDENT IN AN ORGANIZED HEALTH CARE EDUCATION/TRAINING PROGRAM

## 2024-04-12 PROCEDURE — 80048 BASIC METABOLIC PNL TOTAL CA: CPT

## 2024-04-12 PROCEDURE — 7100000001 HC PACU RECOVERY - ADDTL 15 MIN: Performed by: STUDENT IN AN ORGANIZED HEALTH CARE EDUCATION/TRAINING PROGRAM

## 2024-04-12 PROCEDURE — 1200000000 HC SEMI PRIVATE

## 2024-04-12 RX ORDER — PROPOFOL 10 MG/ML
INJECTION, EMULSION INTRAVENOUS PRN
Status: DISCONTINUED | OUTPATIENT
Start: 2024-04-12 | End: 2024-04-12 | Stop reason: SDUPTHER

## 2024-04-12 RX ORDER — SODIUM CHLORIDE 9 MG/ML
INJECTION, SOLUTION INTRAVENOUS CONTINUOUS PRN
Status: DISCONTINUED | OUTPATIENT
Start: 2024-04-12 | End: 2024-04-12 | Stop reason: SDUPTHER

## 2024-04-12 RX ORDER — SUCRALFATE 1 G/1
1 TABLET ORAL EVERY 6 HOURS PRN
Status: DISCONTINUED | OUTPATIENT
Start: 2024-04-12 | End: 2024-04-13 | Stop reason: HOSPADM

## 2024-04-12 RX ADMIN — MIRTAZAPINE 15 MG: 15 TABLET, FILM COATED ORAL at 21:41

## 2024-04-12 RX ADMIN — ROSUVASTATIN CALCIUM 10 MG: 5 TABLET, COATED ORAL at 21:41

## 2024-04-12 RX ADMIN — PANTOPRAZOLE SODIUM 40 MG: 40 INJECTION, POWDER, FOR SOLUTION INTRAVENOUS at 17:24

## 2024-04-12 RX ADMIN — MEGESTROL ACETATE 20 MG: 20 TABLET ORAL at 15:47

## 2024-04-12 RX ADMIN — PHENYLEPHRINE HYDROCHLORIDE 200 MCG: 10 INJECTION INTRAVENOUS at 09:13

## 2024-04-12 RX ADMIN — DOCUSATE SODIUM 100 MG: 100 CAPSULE, LIQUID FILLED ORAL at 15:47

## 2024-04-12 RX ADMIN — PROPOFOL 70 MG: 10 INJECTION, EMULSION INTRAVENOUS at 09:10

## 2024-04-12 RX ADMIN — Medication 3 MG: at 21:41

## 2024-04-12 RX ADMIN — CETIRIZINE HYDROCHLORIDE 10 MG: 10 TABLET, FILM COATED ORAL at 15:48

## 2024-04-12 RX ADMIN — Medication 1 TABLET: at 15:49

## 2024-04-12 RX ADMIN — LEVOTHYROXINE SODIUM 100 MCG: 0.1 TABLET ORAL at 06:17

## 2024-04-12 RX ADMIN — MEGESTROL ACETATE 20 MG: 20 TABLET ORAL at 21:41

## 2024-04-12 RX ADMIN — SUCRALFATE 1 G: 1 TABLET ORAL at 00:24

## 2024-04-12 RX ADMIN — PRIMIDONE 125 MG: 250 TABLET ORAL at 15:49

## 2024-04-12 RX ADMIN — PRIMIDONE 125 MG: 250 TABLET ORAL at 21:41

## 2024-04-12 RX ADMIN — Medication 1 CAPSULE: at 15:48

## 2024-04-12 RX ADMIN — BISACODYL 10 MG: 10 SUPPOSITORY RECTAL at 00:24

## 2024-04-12 RX ADMIN — SUCRALFATE 1 G: 1 TABLET ORAL at 04:52

## 2024-04-12 RX ADMIN — SUCRALFATE 1 G: 1 TABLET ORAL at 15:48

## 2024-04-12 RX ADMIN — SODIUM CHLORIDE: 9 INJECTION, SOLUTION INTRAVENOUS at 15:44

## 2024-04-12 RX ADMIN — SODIUM CHLORIDE 125 MG: 9 INJECTION, SOLUTION INTRAVENOUS at 15:47

## 2024-04-12 RX ADMIN — PHENYLEPHRINE HYDROCHLORIDE 100 MCG: 10 INJECTION INTRAVENOUS at 09:14

## 2024-04-12 RX ADMIN — PANTOPRAZOLE SODIUM 40 MG: 40 INJECTION, POWDER, FOR SOLUTION INTRAVENOUS at 04:52

## 2024-04-12 RX ADMIN — SODIUM CHLORIDE: 9 INJECTION, SOLUTION INTRAVENOUS at 09:09

## 2024-04-12 ASSESSMENT — PAIN - FUNCTIONAL ASSESSMENT: PAIN_FUNCTIONAL_ASSESSMENT: NONE - DENIES PAIN

## 2024-04-12 ASSESSMENT — ENCOUNTER SYMPTOMS: SHORTNESS OF BREATH: 1

## 2024-04-12 NOTE — PROGRESS NOTES
Hospitalist Progress Note      SYNOPSIS: Patient admitted on 2024   Rikki García has a past medical history that includes hyperlipidemia, hypothyroidism, A-fib was sent to ED from nursing home with complaints of coffee-ground emesis and abdominal pain.  Patient is a DNR, family stated they wanted blood transfusions and medications but refused surgeries.  CT scan of abdomen and pelvis without contrast was done no small bowel obstruction noted.  4/10: GI consult, hemoglobin 6, 1 unit PRBC ordered, repeat hemoglobin was 7.8, previous reading likely to be error  : No further emesis, dark stools, blood in stools noted, hemoglobin 7.9, started on clear liquid diet, plan for EGD on : Hemoglobin 7.4, is n.p.o., to go for EGD.    SUBJECTIVE:    Patient seen and examined at bedside, not in acute distress.  To go for EGD.  Records reviewed.   Stable overnight. No overnight issues reported    Temp (24hrs), Av.5 °F (36.4 °C), Min:96.3 °F (35.7 °C), Max:98.8 °F (37.1 °C)    DIET: No diet orders on file  CODE: DNR-CCA    Intake/Output Summary (Last 24 hours) at 2024 1039  Last data filed at 2024 0921  Gross per 24 hour   Intake 150 ml   Output 200 ml   Net -50 ml       OBJECTIVE:    BP (!) 140/95   Pulse (!) 111   Temp 98.1 °F (36.7 °C) (Temporal)   Resp 18   Ht 1.575 m (5' 2\")   Wt 51.9 kg (114 lb 8 oz)   SpO2 97%   BMI 20.94 kg/m²     General appearance: No apparent distress, appears stated age and cooperative.  HEENT:  Normocephalic. Conjunctivae/corneas clear. Moist mucosa   Neck: Supple. No jugular venous distention. Thyroid not visible, non tender   Respiratory: Normal respiratory effort. Clear to auscultation bilaterally. No stridor/wheezing/rhonchi/crackles   Cardiovascular: Regular heart beats, normal S1-S2. No M/G/R  Abdomen: Non distended, soft, non tender, no visceromegaly, no mass, normal bowel sounds   Musculoskeletal: No clubbing, cyanosis, no bilateral lower extremity  **OR** acetaminophen    Labs:     Recent Labs     04/09/24  1826 04/10/24  0203 04/10/24  0810 04/10/24  1515 04/11/24  1732 04/11/24  2349 04/12/24  0655   WBC 10.7  --  6.8  --   --   --   --    HGB 8.3*   < > 7.4*   < > 8.2* 7.4* 7.4*   HCT 27.6*   < > 25.0*   < > 27.7* 24.6* 24.6*   *  --  368  --   --   --   --     < > = values in this interval not displayed.       Recent Labs     04/10/24  0810 04/11/24  0641 04/12/24  0655    136 136   K 3.9 3.9 3.7    109* 108*   CO2 22 16* 17*   BUN 23 17 12   CREATININE 0.9 0.7 0.8   CALCIUM 8.7 8.0* 8.0*   PHOS 3.7  --   --        Recent Labs     04/09/24  1826 04/10/24  0810   PROT 7.1 6.4   ALKPHOS 49 45   ALT 20 16   AST 44* 17   BILITOT 0.2 0.3       Recent Labs     04/09/24 1826   INR 1.2       No results for input(s): \"CKTOTAL\", \"TROPONINI\" in the last 72 hours.    Chronic labs:    Lab Results   Component Value Date    CHOL 127 04/10/2024    TRIG 79 04/10/2024    HDL 25 (L) 04/10/2024    TSH 2.45 04/10/2024    INR 1.2 04/09/2024    LABA1C 5.5 04/10/2024       Radiology: REVIEWED DAILY    +++++++++++++++++++++++++++++++++++++++++++++++++  Blair Washburn MD  [facility]   [facility], [place]  +++++++++++++++++++++++++++++++++++++++++++++++++  NOTE: This report was transcribed using voice recognition software. Every effort was made to ensure accuracy; however, inadvertent computerized transcription errors may be present.

## 2024-04-12 NOTE — PROGRESS NOTES
Called Endo to notify patient is confused and will need someone to sign their consent form.  Told to send down consent and they will call and get informed consent for patient.

## 2024-04-12 NOTE — CARE COORDINATION
4/12/24, CARLA spoke with Sabrina from [facility].  Patient is a return to the facility and is a long term bed hold.  No precert or 7000 is needed.  Patient can return when medically cleared for discharge.  Ambulance and Ambulette form are on the soft chart and will need completed on day of discharge.  At this time patient has been made a WILL CALL for ambulette with Physicians.  Face sheet and envelope are also on soft chart.  CARLA to follow.      KENYON Gregory  [facility] Case Management  [phone]

## 2024-04-12 NOTE — ANESTHESIA POSTPROCEDURE EVALUATION
Department of Anesthesiology  Postprocedure Note    Patient: Rikki García  MRN: [id number]  YOB: 1950  Date of evaluation: 4/12/2024    Procedure Summary       Date: 04/12/24 Room / Location: [place] [address] / [facility]    Anesthesia Start:  Anesthesia Stop:     Procedure: ESOPHAGOGASTRODUODENOSCOPY Diagnosis:       Coffee ground emesis      (Coffee ground emesis [K92.0])    Surgeons: Bry Johnson MD Responsible Provider:     Anesthesia Type: MAC ASA Status: 3            Anesthesia Type: No value filed.    Zuleika Phase I: Zuleika Score: 10    Zuleika Phase II:      Anesthesia Post Evaluation    Patient location during evaluation: PACU  Patient participation: complete - patient participated  Level of consciousness: awake  Pain score: 3  Airway patency: patent  Nausea & Vomiting: no nausea and no vomiting  Cardiovascular status: blood pressure returned to baseline  Respiratory status: acceptable  Hydration status: euvolemic  Comments: Pt's in PACU heart rhythm from sinus to a fib, pt has history of afib.

## 2024-04-12 NOTE — ANESTHESIA PRE PROCEDURE
Component      Latest Ref Rng & Units 7/30/2018   WBC      4.2 - 11.0 K/mcL 7.6   RBC      4.00 - 5.20 mil/mcL 4.26   HGB      12.0 - 15.5 g/dL 13.1   HCT      36.0 - 46.5 % 39.2   MCV      78.0 - 100.0 fl 92.0   MCH      26.0 - 34.0 pg 30.8   MCHC      32.0 - 36.5 g/dL 33.4   RDW-CV      11.0 - 15.0 % 14.5   PLT      140 - 450 K/mcL 268   DIFFERENTIAL TYPE       AUTOMATED DIFFERENTIAL   Neutrophil      % 64   LYMPH      % 25   MONO      % 9   EOSIN      % 2   BASO      % 0   Absolute Neutrophil      1.8 - 7.7 K/mcL 4.8   Absolute Lymph      1.0 - 4.0 K/mcL 1.9   Absolute Mono      0.3 - 0.9 K/mcL 0.7   Absolute Eos      0.1 - 0.5 K/mcL 0.2   Absolute Baso      0.0 - 0.3 K/mcL 0.0   DDIMER, QUANTITATIVE      <0.57 mg/L (FEU) 0.88 (H)   Fasting Status      hrs 1   Sodium      135 - 145 mmol/L 142   Potassium      3.4 - 5.1 mmol/L 3.8   Chloride      98 - 107 mmol/L 103   CO2      21 - 32 mmol/L 28   ANION GAP      10 - 20 mmol/L 15   Glucose      65 - 99 mg/dL 94   BUN      6 - 20 mg/dL 17   Creatinine      0.51 - 0.95 mg/dL 0.69   GFR Estimate,        >90   GFR Estimate, Non        >90   BUN/CREATININE RATIO      7 - 25 25   CALCIUM      8.4 - 10.2 mg/dL 8.7   TOTAL BILIRUBIN      0.2 - 1.0 mg/dL 0.3   AST/SGOT      <38 Units/L 19   ALT/SGPT      <79 Units/L 21   ALK PHOSPHATASE      45 - 117 Units/L 89   TOTAL PROTEIN      6.4 - 8.2 g/dL 7.1   Albumin      3.6 - 5.1 g/dL 3.2 (L)   GLOBULIN      2.0 - 4.0 g/dL 3.9   A/G Ratio, Serum      1.0 - 2.4 0.8 (L)   TROPONIN I      <0.05 ng/mL <0.02   B-TYPE NATRIURETIC PEPTIDE      <100 pg/mL 98     Dr. Cintron reviewed; elevated D Dimer.  Per Dr. Cintron, CT PE protocol needed for further evaluation.  Order entered in EPIC.  Left message for patient to return call.  
40 mg at 04/12/24 0452   • primidone (MYSOLINE) tablet 125 mg  125 mg Oral BID Amparo RossDO   125 mg at 04/11/24 2124       Allergies:  No Known Allergies    Problem List:    Patient Active Problem List   Diagnosis Code   • Small bowel obstruction (HCC) K56.609   • Nasal bleeding R04.0   • Lower abdominal pain R10.30   • SOB (shortness of breath) R06.02   • Troponin I above reference range R79.89   • Acquired hypothyroidism E03.9   • Upper GI bleed K92.2   • Atrial fibrillation (HCC) I48.91   • Hyperlipidemia E78.5   • Hematemesis K92.0   • Melena K92.1       Past Medical History:        Diagnosis Date   • A-fib (HCC)    • Cognitive impairment    • Cystoma    • Heart failure (HCC)    • Hyperlipidemia    • Hypothyroid        Past Surgical History:        Procedure Laterality Date   • CHOLECYSTECTOMY         Social History:    Social History     Tobacco Use   • Smoking status: Never   • Smokeless tobacco: Never   Substance Use Topics   • Alcohol use: Not Currently                                Counseling given: Not Answered      Vital Signs (Current):   Vitals:    04/12/24 0922 04/12/24 0928 04/12/24 0930 04/12/24 0945   BP: 109/68 (!) 160/95 (!) 124/94 (!) 151/100   Pulse: (!) 105 (!) 105 (!) 118 (!) 109   Resp: 20 14 20 21   Temp:  98.1 °F (36.7 °C)     TempSrc:  Temporal     SpO2: 98% 95% 96% 97%   Weight:       Height:                                                  BP Readings from Last 3 Encounters:   04/12/24 (!) 151/100   01/23/23 108/80   12/03/22 99/68       NPO Status:                                                                                 BMI:   Wt Readings from Last 3 Encounters:   04/12/24 51.9 kg (114 lb 8 oz)   01/23/23 43.1 kg (95 lb)   12/03/22 43.5 kg (96 lb)     Body mass index is 20.94 kg/m².    CBC:   Lab Results   Component Value Date/Time    WBC 6.8 04/10/2024 08:10 AM    RBC 3.34 04/10/2024 08:10 AM    HGB 7.4 04/12/2024 06:55 AM    HCT 24.6 04/12/2024 06:55 AM    MCV 74.9 
Never   Substance Use Topics   • Alcohol use: Not Currently                                Counseling given: Not Answered      Vital Signs (Current):   Vitals:    04/11/24 2028 04/12/24 0015 04/12/24 0425 04/12/24 0459   BP: 138/82 126/73 110/69    Pulse: 80 74 71    Resp: 17 17 17    Temp: 37.1 °C (98.8 °F) 36.4 °C (97.6 °F) 36.2 °C (97.1 °F)    TempSrc: Temporal Temporal Temporal    SpO2: 97% 96% 95%    Weight:    51.9 kg (114 lb 8 oz)   Height:                                                  BP Readings from Last 3 Encounters:   04/12/24 110/69   01/23/23 108/80   12/03/22 99/68       NPO Status:                                                                                 BMI:   Wt Readings from Last 3 Encounters:   04/12/24 51.9 kg (114 lb 8 oz)   01/23/23 43.1 kg (95 lb)   12/03/22 43.5 kg (96 lb)     Body mass index is 20.94 kg/m².    CBC:   Lab Results   Component Value Date/Time    WBC 6.8 04/10/2024 08:10 AM    RBC 3.34 04/10/2024 08:10 AM    HGB 7.4 04/12/2024 06:55 AM    HCT 24.6 04/12/2024 06:55 AM    MCV 74.9 04/10/2024 08:10 AM    RDW 16.2 04/10/2024 08:10 AM     04/10/2024 08:10 AM       CMP:   Lab Results   Component Value Date/Time     04/12/2024 06:55 AM    K 3.7 04/12/2024 06:55 AM    K 3.3 03/09/2022 12:50 PM     04/12/2024 06:55 AM    CO2 17 04/12/2024 06:55 AM    BUN 12 04/12/2024 06:55 AM    CREATININE 0.8 04/12/2024 06:55 AM    GFRAA >60 08/17/2022 04:24 AM    LABGLOM >90 04/12/2024 06:55 AM    GLUCOSE 77 04/12/2024 06:55 AM    PROT 6.4 04/10/2024 08:10 AM    CALCIUM 8.0 04/12/2024 06:55 AM    BILITOT 0.3 04/10/2024 08:10 AM    ALKPHOS 45 04/10/2024 08:10 AM    AST 17 04/10/2024 08:10 AM    ALT 16 04/10/2024 08:10 AM       POC Tests: No results for input(s): \"POCGLU\", \"POCNA\", \"POCK\", \"POCCL\", \"POCBUN\", \"POCHEMO\", \"POCHCT\" in the last 72 hours.    Coags:   Lab Results   Component Value Date/Time    PROTIME 13.2 04/09/2024 06:26 PM    INR 1.2 04/09/2024 06:26 PM    APTT

## 2024-04-12 NOTE — DISCHARGE INSTR - COC
Continuity of Care Form    Patient Name: Rikki García   :  1950  MRN:  [id number]    Admit date:  2024  Discharge date:  ***    Code Status Order: DNR-CCA   Advance Directives:     Admitting Physician:  Amparo Ross DO  PCP: Jose Antonio Woods MD    Discharging Nurse: ***  Discharging Hospital Unit/Room#: 8209/8209-B  Discharging Unit Phone Number: ***    Emergency Contact:   Extended Emergency Contact Information  Primary Emergency Contact: DAVID GARLAND  Home Phone: [phone]  Relation: Niece/Nephew   needed? No  Secondary Emergency Contact: ERVIN ALEXANDER  Home Phone: [phone]  Relation: Niece/Nephew   needed? No    Past Surgical History:  Past Surgical History:   Procedure Laterality Date    CHOLECYSTECTOMY         Immunization History:     There is no immunization history on file for this patient.    Active Problems:  Patient Active Problem List   Diagnosis Code    Small bowel obstruction (HCC) K56.609    Nasal bleeding R04.0    Lower abdominal pain R10.30    SOB (shortness of breath) R06.02    Troponin I above reference range R79.89    Acquired hypothyroidism E03.9    Upper GI bleed K92.2    Atrial fibrillation (HCC) I48.91    Hyperlipidemia E78.5    Hematemesis K92.0    Melena K92.1       Isolation/Infection:   Isolation            No Isolation          Patient Infection Status       Infection Onset Added Last Indicated Last Indicated By Review Planned Expiration Resolved Resolved By    None active    Resolved    COVID-19 02/13/23 02/15/23 02/13/23 COVID-19   23 Infection                        Nurse Assessment:  Last Vital Signs: BP (!) 141/82   Pulse 94   Temp 97.9 °F (36.6 °C) (Temporal)   Resp 18   Ht 1.575 m (5' 2\")   Wt 51.9 kg (114 lb 8 oz)   SpO2 99%   BMI 20.94 kg/m²     Last documented pain score (0-10 scale):    Last Weight:   Wt Readings from Last 1 Encounters:   24 51.9 kg (114 lb 8 oz)     Mental Status:  {IP PT MENTAL  Status Date: ***    Readmission Risk Assessment Score:  Readmission Risk              Risk of Unplanned Readmission:  16           Discharging to Facility/ Agency   Name: [facility]  Address:  Phone:  Fax:    Dialysis Facility (if applicable)   Name:  Address:  Dialysis Schedule:  Phone:  Fax:    / signature: Electronically signed by KENYON Gregory on 4/12/24 at 1:46 PM EDT    PHYSICIAN SECTION    Prognosis: Good    Condition at Discharge: Stable    Rehab Potential (if transferring to Rehab): Good    Recommended Labs or Other Treatments After Discharge: ***    Physician Certification: I certify the above information and transfer of Rikki García  is necessary for the continuing treatment of the diagnosis listed and that he requires Skilled Nursing Facility for less 30 days.     Update Admission H&P: {CHP DME Changes in HandP:[id number]}    PHYSICIAN SIGNATURE:  {Esignature:[id number]}

## 2024-04-12 NOTE — PROGRESS NOTES
Dr Barber called  about patients heart rate , patient had history of a fib and on monitored unit upstairs no new order

## 2024-04-12 NOTE — PROGRESS NOTES
Physician Progress Note      PATIENT:               ROYCE RUTH  CSN #:                  [id number]  :                       1950  ADMIT DATE:       2024 5:04 PM  DISCH DATE:  RESPONDING  PROVIDER #:        Zunilda Proctor MD          QUERY TEXT:    Pt admitted with GI bleeding/hematemesis and has acute on chronic anemia   documented per H&P. If possible, please document in progress notes and   discharge summary further specificity regarding the acuity and type of anemia:    The medical record reflects the following:  Risk Factors: On Pradaxa; History of GERD/gastritis and peptic ulcer Disease  Clinical Indicators:  Per H&P: GIB started on protonix.  H&H q6, transfuse for hemoglobin <7.  Acute   on chronic anemia due to GIB  24: H&H 8.3/27.6  4/10/24: H&H 6.0/20.4  Treatment: H&H monitoring; Gastroenterology consult; EGD planned; Protonix IV;   Carafate PO; Packed red blood cell ordered    Thank You,  Crissy Molina BSN, R.N.  Clinical Documentation Integrity  [phone]  Options provided:  -- Anemia due to acute blood loss  -- Anemia due to chronic blood loss  -- Anemia due to acute on chronic blood loss  -- Other - I will add my own diagnosis  -- Disagree - Not applicable / Not valid  -- Disagree - Clinically unable to determine / Unknown  -- Refer to Clinical Documentation Reviewer    PROVIDER RESPONSE TEXT:    This patient has acute on chronic blood loss anemia.    Query created by: Crissy Molina on 2024 7:33 AM      QUERY TEXT:    Pt admitted with GI bleeding/hematemesis and has acute on chronic anemia   documented per H&P and is on Pradaxa.   If possible, please document in the   progress notes and discharge summary if you are evaluating and/or treating any   of the following:    The medical record reflects the following:  Risk Factors: On Pradaxa; History of GERD/gastritis and peptic ulcer Disease  Clinical Indicators:  Per H&P: GIB started on Protonix.  H&H q6, transfuse for

## 2024-04-13 VITALS
HEIGHT: 62 IN | DIASTOLIC BLOOD PRESSURE: 60 MMHG | OXYGEN SATURATION: 97 % | BODY MASS INDEX: 20.98 KG/M2 | RESPIRATION RATE: 18 BRPM | WEIGHT: 114 LBS | TEMPERATURE: 97.3 F | HEART RATE: 79 BPM | SYSTOLIC BLOOD PRESSURE: 111 MMHG

## 2024-04-13 LAB
ABO/RH: NORMAL
ANION GAP SERPL CALCULATED.3IONS-SCNC: 12 MMOL/L (ref 7–16)
ANTIBODY SCREEN: NEGATIVE
ARM BAND NUMBER: NORMAL
BLOOD BANK DISPENSE STATUS: NORMAL
BLOOD BANK SAMPLE EXPIRATION: NORMAL
BPU ID: NORMAL
BUN SERPL-MCNC: 19 MG/DL (ref 6–23)
CALCIUM SERPL-MCNC: 8 MG/DL (ref 8.6–10.2)
CHLORIDE SERPL-SCNC: 111 MMOL/L (ref 98–107)
CO2 SERPL-SCNC: 16 MMOL/L (ref 22–29)
COMPONENT: NORMAL
CREAT SERPL-MCNC: 0.8 MG/DL (ref 0.7–1.2)
CROSSMATCH RESULT: NORMAL
FERRITIN SERPL-MCNC: 77 NG/ML
GFR SERPL CREATININE-BSD FRML MDRD: >90 ML/MIN/1.73M2
GLUCOSE SERPL-MCNC: 106 MG/DL (ref 74–99)
IRON SATN MFR SERPL: 89 % (ref 20–55)
IRON SERPL-MCNC: 256 UG/DL (ref 59–158)
POTASSIUM SERPL-SCNC: 3.9 MMOL/L (ref 3.5–5)
SODIUM SERPL-SCNC: 139 MMOL/L (ref 132–146)
TIBC SERPL-MCNC: 288 UG/DL (ref 250–450)
TRANSFUSION STATUS: NORMAL
UNIT DIVISION: 0

## 2024-04-13 PROCEDURE — 6370000000 HC RX 637 (ALT 250 FOR IP): Performed by: INTERNAL MEDICINE

## 2024-04-13 PROCEDURE — 6360000002 HC RX W HCPCS: Performed by: STUDENT IN AN ORGANIZED HEALTH CARE EDUCATION/TRAINING PROGRAM

## 2024-04-13 PROCEDURE — 80048 BASIC METABOLIC PNL TOTAL CA: CPT

## 2024-04-13 PROCEDURE — 2580000003 HC RX 258: Performed by: STUDENT IN AN ORGANIZED HEALTH CARE EDUCATION/TRAINING PROGRAM

## 2024-04-13 PROCEDURE — C9113 INJ PANTOPRAZOLE SODIUM, VIA: HCPCS | Performed by: INTERNAL MEDICINE

## 2024-04-13 PROCEDURE — 6360000002 HC RX W HCPCS: Performed by: INTERNAL MEDICINE

## 2024-04-13 PROCEDURE — 83540 ASSAY OF IRON: CPT

## 2024-04-13 PROCEDURE — 2580000003 HC RX 258: Performed by: INTERNAL MEDICINE

## 2024-04-13 PROCEDURE — 82728 ASSAY OF FERRITIN: CPT

## 2024-04-13 PROCEDURE — 6370000000 HC RX 637 (ALT 250 FOR IP): Performed by: STUDENT IN AN ORGANIZED HEALTH CARE EDUCATION/TRAINING PROGRAM

## 2024-04-13 PROCEDURE — 36415 COLL VENOUS BLD VENIPUNCTURE: CPT

## 2024-04-13 PROCEDURE — 83550 IRON BINDING TEST: CPT

## 2024-04-13 PROCEDURE — 99239 HOSP IP/OBS DSCHRG MGMT >30: CPT | Performed by: STUDENT IN AN ORGANIZED HEALTH CARE EDUCATION/TRAINING PROGRAM

## 2024-04-13 RX ORDER — LANOLIN ALCOHOL/MO/W.PET/CERES
3 CREAM (GRAM) TOPICAL
Qty: 30 TABLET | Refills: 3 | DISCHARGE
Start: 2024-04-13

## 2024-04-13 RX ORDER — FERROUS SULFATE 325(65) MG
325 TABLET ORAL
Qty: 60 TABLET | Refills: 0 | DISCHARGE
Start: 2024-04-15

## 2024-04-13 RX ORDER — SODIUM BICARBONATE 650 MG/1
650 TABLET ORAL 2 TIMES DAILY
Status: DISCONTINUED | OUTPATIENT
Start: 2024-04-13 | End: 2024-04-13 | Stop reason: HOSPADM

## 2024-04-13 RX ORDER — SUCRALFATE 1 G/1
1 TABLET ORAL 4 TIMES DAILY
Qty: 120 TABLET | Refills: 0 | DISCHARGE
Start: 2024-04-13

## 2024-04-13 RX ORDER — SODIUM BICARBONATE 650 MG/1
650 TABLET ORAL 2 TIMES DAILY
Qty: 30 TABLET | Refills: 0 | DISCHARGE
Start: 2024-04-13

## 2024-04-13 RX ORDER — M-VIT,TX,IRON,MINS/CALC/FOLIC 27MG-0.4MG
1 TABLET ORAL DAILY
Qty: 90 TABLET | Refills: 1 | DISCHARGE
Start: 2024-04-13

## 2024-04-13 RX ADMIN — ASPIRIN 81 MG: 81 TABLET, COATED ORAL at 09:46

## 2024-04-13 RX ADMIN — PANTOPRAZOLE SODIUM 40 MG: 40 INJECTION, POWDER, FOR SOLUTION INTRAVENOUS at 06:17

## 2024-04-13 RX ADMIN — LEVOTHYROXINE SODIUM 100 MCG: 0.1 TABLET ORAL at 06:17

## 2024-04-13 RX ADMIN — DOCUSATE SODIUM 100 MG: 100 CAPSULE, LIQUID FILLED ORAL at 09:45

## 2024-04-13 RX ADMIN — SODIUM CHLORIDE, PRESERVATIVE FREE 10 ML: 5 INJECTION INTRAVENOUS at 09:48

## 2024-04-13 RX ADMIN — MEGESTROL ACETATE 20 MG: 20 TABLET ORAL at 09:46

## 2024-04-13 RX ADMIN — SODIUM BICARBONATE 650 MG: 650 TABLET ORAL at 14:20

## 2024-04-13 RX ADMIN — PRIMIDONE 125 MG: 250 TABLET ORAL at 09:47

## 2024-04-13 RX ADMIN — CETIRIZINE HYDROCHLORIDE 10 MG: 10 TABLET, FILM COATED ORAL at 09:45

## 2024-04-13 RX ADMIN — Medication 1 TABLET: at 09:45

## 2024-04-13 RX ADMIN — SODIUM CHLORIDE 125 MG: 9 INJECTION, SOLUTION INTRAVENOUS at 09:55

## 2024-04-13 RX ADMIN — Medication 1 CAPSULE: at 09:45

## 2024-04-13 RX ADMIN — MEGESTROL ACETATE 20 MG: 20 TABLET ORAL at 14:20

## 2024-04-13 NOTE — DISCHARGE SUMMARY
Hospitalist Discharge Summary    Patient ID: Rikki García   Patient : 1950  Patient's PCP: Jose Antonio Woods MD    Admit Date: 2024   Admitting Physician: Amparo Ross DO    Discharge Date:  2024   Discharge Physician: Zunilda Proctor MD   Discharge Condition: Stable  Discharge Disposition: Skilled Facility      Hospital course in brief:  (Please refer to daily progress notes for a comprehensive review of the hospitalization by requesting medical records)    Rikki García is 72 y/o has a past medical history that includes hyperlipidemia, hypothyroidism, A-fib was sent to ED from nursing home with complaints of coffee-ground emesis and abdominal pain.  Patient is a DNR, family stated they wanted blood transfusions and medications but refused surgeries.  CT scan of abdomen and pelvis without contrast was done no small bowel obstruction noted.  4/10: GI consult, hemoglobin 6, 1 unit PRBC ordered, repeat hemoglobin was 7.8, previous reading likely to be error  : No further emesis, dark stools, blood in stools noted, hemoglobin 7.9, started on clear liquid diet, plan for EGD on : EGD was done which was normal.  His hemoglobin remained stable at 9, tolerated diet, vitals has been stable.  He received IV iron while at the hospital, will order oral iron supplement for every other day  He is stable from medical standpoint to be discharged back to the facility.    Physical Exam:    General appearance: No apparent distress   HEENT:  Normocephalic. Conjunctivae/corneas clear. Moist mucosa   Neck: Supple. No jugular venous distention. Thyroid not visible, non tender   Respiratory: Normal respiratory effort. Clear to auscultation bilaterally. No stridor/wheezing/rhonchi/crackles   Cardiovascular: Regular heart beats, normal S1-S2. No M/G/R  Abdomen: Non distended, soft, non tender, no visceromegaly, no mass, normal bowel sounds   Musculoskeletal: No clubbing, cyanosis, no bilateral lower  Association. All Rights Reserved.       The CPT codes, CCI edits and ICD codes generated are intended as suggestions       and were generated based on input data.  These codes are preliminary and upon        review may be revised to meet current compliance and payer requirements.       The provider is responsible for the final determination of appropriate codes,       and modifiers. Dr. Bry Johnson This document has been electronically signed. Note Initiated:4/12/2024 Note Completed:4/12/2024 9:40 AM    CT ABDOMEN PELVIS WO CONTRAST Additional Contrast? None    Result Date: 4/10/2024  EXAMINATION: CT OF THE ABDOMEN AND PELVIS WITHOUT CONTRAST 4/10/2024 4:02 am TECHNIQUE: CT of the abdomen and pelvis was performed without the administration of intravenous contrast. Multiplanar reformatted images are provided for review. Automated exposure control, iterative reconstruction, and/or weight based adjustment of the mA/kV was utilized to reduce the radiation dose to as low as reasonably achievable. COMPARISON: None. HISTORY: ORDERING SYSTEM PROVIDED HISTORY: hematemesis TECHNOLOGIST PROVIDED HISTORY: Additional Contrast?->None Reason for exam:->hematemesis What reading provider will be dictating this exam?->CRC FINDINGS: Lower Chest: Chronic changes seen in the lung bases bilaterally. Organs: Liver is homogeneous in appearance.  Gallbladder is been surgically removed.  Mild extrahepatic biliary ductal dilatation which can be seen in a post cholecystectomy patient.  Low-attenuation structure seen within the right lobe of the liver which may suggest a small cyst.  Pancreas is homogeneous in appearance.  Spleen is unremarkable.  Both adrenal glands are within normal limits.  Symmetric appearance of the kidneys.  Simple renal cortical cyst identified in the right kidney in which no further follow-up is recommended.  No stones or distension seen in the renal collecting system. GI/Bowel: Moderate size hiatal hernia.  Stomach  is unremarkable in appearance.  No significant wall thickening identified of the stomach.  There is decompression.  Diverticulum identified off the 2nd portion of the duodenum.  Small bowel is within normal limits.  No mucosal abnormality. Some stool seen scattered diffusely throughout the colon.  There is mild wall thickening identified of the distal sigmoid colon and rectal vault which may be related to incomplete distension.  Mild proctitis cannot be excluded. Clinical correlation is needed.  The appendix is normal. Pelvis: The bladder is incompletely distended.  The prostate is unremarkable. Minimal dystrophic calcification. Peritoneum/Retroperitoneum: No abdominal retroperitoneal lymphadenopathy.  No free fluid or free air.  No abnormal mass or fluid collections identified. Some vascular calcifications seen within the abdominal aorta and iliac vessels. Bones/Soft Tissues: Bony structures revealed degenerative changes identified diffusely throughout the spine and pelvis.  Irregularity of the superior endplates of the T10 and T11 level with mild anterior wedging.  Schmorl's nodes seen of the superior endplate of T10.  Findings are chronic.     1. Mild wall thickening identified of the distal sigmoid colon and rectal vault which may be related to incomplete distension. Mild proctitis cannot be excluded. 2. Moderate size hiatal hernia. 3. Diverticulum identified off the 2nd portion of the duodenum.  Remainder of the small bowel is unremarkable. 4. Extensive degenerative changes seen within the spine most pronounced at the thoracolumbar spine.  Chronic changes seen in loss of height of the superior aspect of the T10 and T11 levels.       Discharge Medications:      Medication List        START taking these medications      ferrous sulfate 325 (65 Fe) MG tablet  Commonly known as: IRON 325  Take 1 tablet by mouth three times a week  Start taking on: April 15, 2024     melatonin 3 MG Tabs tablet  Take 1 tablet by

## 2024-04-13 NOTE — PLAN OF CARE
Problem: ABCDS Injury Assessment  Goal: Absence of physical injury  Outcome: Progressing     Problem: Safety - Adult  Goal: Free from fall injury  Outcome: Progressing     Problem: Skin/Tissue Integrity  Goal: Absence of new skin breakdown  Description: 1.  Monitor for areas of redness and/or skin breakdown  2.  Assess vascular access sites hourly  3.  Every 4-6 hours minimum:  Change oxygen saturation probe site  4.  Every 4-6 hours:  If on nasal continuous positive airway pressure, respiratory therapy assess nares and determine need for appliance change or resting period.  Outcome: Progressing

## 2024-04-13 NOTE — PLAN OF CARE
Problem: Skin/Tissue Integrity  Goal: Absence of new skin breakdown  Description: 1.  Monitor for areas of redness and/or skin breakdown  2.  Assess vascular access sites hourly  3.  Every 4-6 hours minimum:  Change oxygen saturation probe site  4.  Every 4-6 hours:  If on nasal continuous positive airway pressure, respiratory therapy assess nares and determine need for appliance change or resting period.  Outcome: Adequate for Discharge     Problem: Safety - Adult  Goal: Free from fall injury  Outcome: Adequate for Discharge     Problem: ABCDS Injury Assessment  Goal: Absence of physical injury  Outcome: Adequate for Discharge

## 2024-04-13 NOTE — PROGRESS NOTES
Nurse to nurse called to [facility]. Patients Shelbie shafer to transport patient back to the facility.

## 2024-04-13 NOTE — PROGRESS NOTES
Transport here for patient during bedside shift report. Patient 's gown changed and ector care done. Patient off to EGD at this time.

## 2024-04-17 LAB
ALBUMIN SERPL-MCNC: 3.6 G/DL (ref 3.5–5.2)
ALP SERPL-CCNC: 55 U/L (ref 40–129)
ALT SERPL-CCNC: 19 U/L (ref 0–40)
ANION GAP SERPL CALCULATED.3IONS-SCNC: 14 MMOL/L (ref 7–16)
AST SERPL-CCNC: 20 U/L (ref 0–39)
BASOPHILS # BLD: 0.05 K/UL (ref 0–0.2)
BASOPHILS NFR BLD: 0 % (ref 0–2)
BILIRUB SERPL-MCNC: <0.2 MG/DL (ref 0–1.2)
BUN SERPL-MCNC: 17 MG/DL (ref 6–23)
CALCIUM SERPL-MCNC: 9.4 MG/DL (ref 8.6–10.2)
CHLORIDE SERPL-SCNC: 106 MMOL/L (ref 98–107)
CO2 SERPL-SCNC: 20 MMOL/L (ref 22–29)
CREAT SERPL-MCNC: 0.7 MG/DL (ref 0.7–1.2)
EOSINOPHIL # BLD: 0.27 K/UL (ref 0.05–0.5)
EOSINOPHILS RELATIVE PERCENT: 2 % (ref 0–6)
ERYTHROCYTE [DISTWIDTH] IN BLOOD BY AUTOMATED COUNT: 20 % (ref 11.5–15)
GFR SERPL CREATININE-BSD FRML MDRD: >90 ML/MIN/1.73M2
GLUCOSE SERPL-MCNC: 97 MG/DL (ref 74–99)
HCT VFR BLD AUTO: 25.2 % (ref 37–54)
HGB BLD-MCNC: 7.7 G/DL (ref 12.5–16.5)
IMM GRANULOCYTES # BLD AUTO: 0.07 K/UL (ref 0–0.58)
IMM GRANULOCYTES NFR BLD: 1 % (ref 0–5)
LYMPHOCYTES NFR BLD: 1.64 K/UL (ref 1.5–4)
LYMPHOCYTES RELATIVE PERCENT: 15 % (ref 20–42)
MCH RBC QN AUTO: 23.5 PG (ref 26–35)
MCHC RBC AUTO-ENTMCNC: 30.6 G/DL (ref 32–34.5)
MCV RBC AUTO: 77.1 FL (ref 80–99.9)
MONOCYTES NFR BLD: 0.56 K/UL (ref 0.1–0.95)
MONOCYTES NFR BLD: 5 % (ref 2–12)
NEUTROPHILS NFR BLD: 77 % (ref 43–80)
NEUTS SEG NFR BLD: 8.53 K/UL (ref 1.8–7.3)
PLATELET # BLD AUTO: 335 K/UL (ref 130–450)
PMV BLD AUTO: 10.2 FL (ref 7–12)
POTASSIUM SERPL-SCNC: 3.8 MMOL/L (ref 3.5–5)
PROT SERPL-MCNC: 6.3 G/DL (ref 6.4–8.3)
RBC # BLD AUTO: 3.27 M/UL (ref 3.8–5.8)
SODIUM SERPL-SCNC: 140 MMOL/L (ref 132–146)
WBC OTHER # BLD: 11.1 K/UL (ref 4.5–11.5)

## 2024-09-30 ENCOUNTER — OFFICE VISIT (OUTPATIENT)
Dept: ENT CLINIC | Age: 74
End: 2024-09-30

## 2024-09-30 VITALS
BODY MASS INDEX: 19.57 KG/M2 | DIASTOLIC BLOOD PRESSURE: 79 MMHG | WEIGHT: 107 LBS | SYSTOLIC BLOOD PRESSURE: 125 MMHG | HEART RATE: 77 BPM

## 2024-09-30 DIAGNOSIS — B02.21 HERPES ZOSTER OTICUS: Primary | ICD-10-CM

## 2024-09-30 DIAGNOSIS — H61.21 HEARING LOSS DUE TO CERUMEN IMPACTION, RIGHT: ICD-10-CM

## 2024-09-30 RX ORDER — ACYCLOVIR 400 MG/1
400 TABLET ORAL
Qty: 50 TABLET | Refills: 0 | Status: SHIPPED | OUTPATIENT
Start: 2024-09-30 | End: 2024-10-10

## 2024-09-30 NOTE — PROGRESS NOTES
[facility]  Dr. Jarrett Willett D.O. Ms.Ed.  New Consult       Patient Name:  Rikki García  :  1950     CHIEF C/O:    Chief Complaint   Patient presents with    New Patient     NP Dysphagia, oral phase         HISTORY OBTAINED FROM:  patient    HISTORY OF PRESENT ILLNESS:       Rikki is a 74 y.o. year old male, here today for:       Patient presents today for evaluation of persistent right ear pain from this facility, he has his family care members are here with him to allow for further details of complaints.  Mona the ear pain started in the right ear approximately 4 weeks ago, patient was placed on multiple antibiotics, as well as with no improvement.  There has been no hearing loss, there is been no ear drainage, there is no here history of fever or chills.  Patient states ear pain is still present now, it is to the external ear itself there is no reported history of ear surgeries, there is underlying history of mild hearing loss.           Past Medical History:   Diagnosis Date    A-fib (HCC)     Cognitive impairment     Cystoma     Heart failure (HCC)     Hyperlipidemia     Hypothyroid      Past Surgical History:   Procedure Laterality Date    CHOLECYSTECTOMY      UPPER GASTROINTESTINAL ENDOSCOPY N/A 2024    ESOPHAGOGASTRODUODENOSCOPY performed by Bry Johnson MD at [facility] ENDOSCOPY       Current Outpatient Medications:     melatonin 3 MG TABS tablet, Take 1 tablet by mouth nightly, Disp: 30 tablet, Rfl: 3    Multiple Vitamins-Minerals (THERAPEUTIC MULTIVITAMIN-MINERALS) tablet, Take 1 tablet by mouth daily, Disp: 90 tablet, Rfl: 1    sodium bicarbonate 650 MG tablet, Take 1 tablet by mouth 2 times daily, Disp: 30 tablet, Rfl: 0    sucralfate (CARAFATE) 1 GM tablet, Take 1 tablet by mouth 4 times daily, Disp: 120 tablet, Rfl: 0    ferrous sulfate (IRON 325) 325 (65 Fe) MG tablet, Take 1 tablet by mouth three times a week, Disp: 60 tablet, Rfl: 0    aluminum & magnesium

## 2024-10-01 ENCOUNTER — TELEPHONE (OUTPATIENT)
Dept: ENT CLINIC | Age: 74
End: 2024-10-01

## 2024-10-01 NOTE — TELEPHONE ENCOUNTER
Called and spoke with nursing home for clarification. Dean stated per their protocol, any open shingles wound needs isolated for 14 days. She stated the family was concerned about the patient being isolated for 14 days due to his mental condition. Abby stated if we can at least get a verbal for now stating that the lesions were crusted over, he could be removed from isolation and they would await for the visit notes to be completed for their files. I advised I would give this information to the doctor, and as soon as I received an answer I would call back to advise both the facility and patients niece.

## 2024-10-01 NOTE — TELEPHONE ENCOUNTER
Tired calling [facility] and was transferred to the [facility] where pt lives. No one at [facility] answered after 10 mins of waiting. Will try again in the morning

## 2024-10-01 NOTE — TELEPHONE ENCOUNTER
Mares from [facility] called office and LM requesting a order stating if patient should be isolated or not.  Policy states patient should be isolated with shingles. Fax order to [phone] address to Mares    Shelbie called and LM stating her uncle has shingles in ear and he was put in isolation for 14 days. Facility said patient is contagious. Niece request a call back.

## 2024-10-01 NOTE — TELEPHONE ENCOUNTER
Spoke with patients soni. She stated the nursing home is needing the staging of shingles and the need for isolation or not. I advised that we have not provided staging, and that the isolation protocol is per nursing home guidelines. I advised I could forward the message to Dr. Willett to have office notes completed and routed if they needed information, but ultimately the decision for isolation would be up to them. Soni noted understanding.

## 2024-10-01 NOTE — TELEPHONE ENCOUNTER
Patient's niece called Leslie stating that the \"nursing home is throwing a fit\" because the print outs the office provider at the last appointment didn't give a stage. I expressed that the doctor was in SX but I would route the call to him and he would respond as soon as he was able, the niece said \"That's not going to work,\" then I handed Qing the call.

## 2024-10-02 NOTE — TELEPHONE ENCOUNTER
Mares from [facility] LM asking for a call back at [phone]. [facility] needs an order stating pt does not need isolation faxed to [phone]

## 2024-10-02 NOTE — TELEPHONE ENCOUNTER
Ani called stating that [facility] had no heard from the office in regards to isolation. I advised we attempted to reach them but was not able to speak with anyone or leave a voicemail, that I will attempt contact again. Ani noted understanding.   Including Pricing Information In The Note: No

## 2024-10-02 NOTE — TELEPHONE ENCOUNTER
Called and spoke with a nurse on the East Wing and they have been notified that the patients shingles is crusted over and he does not need to remain in isolation. The nurse stated he has not been following orders for isolation anyway. Notified Ani that the nursing home has been advised and we will fax the office notes once they are completed.

## 2024-10-02 NOTE — TELEPHONE ENCOUNTER
Please sign office note from 9/30/24 advising shigles is crusted over and patient does not need to be isolated for nursing home records.

## 2024-10-08 ASSESSMENT — ENCOUNTER SYMPTOMS
COUGH: 0
SHORTNESS OF BREATH: 0
VOMITING: 0

## 2024-10-27 ENCOUNTER — HOSPITAL ENCOUNTER (INPATIENT)
Age: 74
LOS: 4 days | Discharge: NURSING, REHAB OR LONG-TERM CARE | End: 2024-10-31
Attending: INTERNAL MEDICINE | Admitting: INTERNAL MEDICINE
Payer: COMMERCIAL

## 2024-10-27 ENCOUNTER — APPOINTMENT (OUTPATIENT)
Dept: CT IMAGING | Age: 74
End: 2024-10-27
Payer: COMMERCIAL

## 2024-10-27 ENCOUNTER — HOSPITAL ENCOUNTER (EMERGENCY)
Age: 74
Discharge: ANOTHER HOSPITAL | End: 2024-10-27
Attending: EMERGENCY MEDICINE
Payer: COMMERCIAL

## 2024-10-27 VITALS
OXYGEN SATURATION: 93 % | SYSTOLIC BLOOD PRESSURE: 103 MMHG | RESPIRATION RATE: 12 BRPM | DIASTOLIC BLOOD PRESSURE: 63 MMHG | TEMPERATURE: 99.2 F | HEART RATE: 89 BPM

## 2024-10-27 DIAGNOSIS — E86.0 DEHYDRATION: ICD-10-CM

## 2024-10-27 DIAGNOSIS — R19.7 DIARRHEA, UNSPECIFIED TYPE: Primary | ICD-10-CM

## 2024-10-27 DIAGNOSIS — R93.5 ABNORMAL CT OF THE ABDOMEN: ICD-10-CM

## 2024-10-27 DIAGNOSIS — R79.89 ELEVATED TROPONIN: ICD-10-CM

## 2024-10-27 DIAGNOSIS — D72.829 LEUKOCYTOSIS, UNSPECIFIED TYPE: ICD-10-CM

## 2024-10-27 LAB
ALBUMIN SERPL-MCNC: 4.3 G/DL (ref 3.5–5.2)
ALP SERPL-CCNC: 61 U/L (ref 40–129)
ALT SERPL-CCNC: 37 U/L (ref 0–40)
ANION GAP SERPL CALCULATED.3IONS-SCNC: 12 MMOL/L (ref 7–16)
AST SERPL-CCNC: 33 U/L (ref 0–39)
ATYPICAL LYMPHOCYTE ABSOLUTE COUNT: 0.22 K/UL (ref 0–0.46)
ATYPICAL LYMPHOCYTES: 2 % (ref 0–4)
B PARAP IS1001 DNA NPH QL NAA+NON-PROBE: NOT DETECTED
B PERT DNA SPEC QL NAA+PROBE: NOT DETECTED
BASOPHILS # BLD: 0.11 K/UL (ref 0–0.2)
BASOPHILS NFR BLD: 1 % (ref 0–2)
BILIRUB SERPL-MCNC: 0.3 MG/DL (ref 0–1.2)
BUN SERPL-MCNC: 23 MG/DL (ref 6–23)
C PNEUM DNA NPH QL NAA+NON-PROBE: NOT DETECTED
CALCIUM SERPL-MCNC: 8.7 MG/DL (ref 8.6–10.2)
CHLORIDE SERPL-SCNC: 109 MMOL/L (ref 98–107)
CO2 SERPL-SCNC: 21 MMOL/L (ref 22–29)
CREAT SERPL-MCNC: 0.8 MG/DL (ref 0.7–1.2)
DATE, STOOL #1: ABNORMAL
EOSINOPHIL # BLD: 0 K/UL (ref 0.05–0.5)
EOSINOPHILS RELATIVE PERCENT: 0 % (ref 0–6)
ERYTHROCYTE [DISTWIDTH] IN BLOOD BY AUTOMATED COUNT: 14.1 % (ref 11.5–15)
FLUAV RNA NPH QL NAA+NON-PROBE: NOT DETECTED
FLUBV RNA NPH QL NAA+NON-PROBE: NOT DETECTED
GFR, ESTIMATED: >90 ML/MIN/1.73M2
GLUCOSE SERPL-MCNC: 153 MG/DL (ref 74–99)
HADV DNA NPH QL NAA+NON-PROBE: NOT DETECTED
HCOV 229E RNA NPH QL NAA+NON-PROBE: NOT DETECTED
HCOV HKU1 RNA NPH QL NAA+NON-PROBE: NOT DETECTED
HCOV NL63 RNA NPH QL NAA+NON-PROBE: NOT DETECTED
HCOV OC43 RNA NPH QL NAA+NON-PROBE: NOT DETECTED
HCT VFR BLD AUTO: 45.6 % (ref 37–54)
HEMOCCULT SP1 STL QL: POSITIVE
HGB BLD-MCNC: 15 G/DL (ref 12.5–16.5)
HMPV RNA NPH QL NAA+NON-PROBE: NOT DETECTED
HPIV1 RNA NPH QL NAA+NON-PROBE: NOT DETECTED
HPIV2 RNA NPH QL NAA+NON-PROBE: NOT DETECTED
HPIV3 RNA NPH QL NAA+NON-PROBE: NOT DETECTED
HPIV4 RNA NPH QL NAA+NON-PROBE: NOT DETECTED
LACTATE BLDV-SCNC: 1.4 MMOL/L (ref 0.5–2.2)
LIPASE SERPL-CCNC: 75 U/L (ref 13–60)
LYMPHOCYTES NFR BLD: 0.54 K/UL (ref 1.5–4)
LYMPHOCYTES RELATIVE PERCENT: 4 % (ref 20–42)
M PNEUMO DNA NPH QL NAA+NON-PROBE: NOT DETECTED
MAGNESIUM SERPL-MCNC: 2.3 MG/DL (ref 1.6–2.6)
MCH RBC QN AUTO: 31.5 PG (ref 26–35)
MCHC RBC AUTO-ENTMCNC: 32.9 G/DL (ref 32–34.5)
MCV RBC AUTO: 95.8 FL (ref 80–99.9)
MONOCYTES NFR BLD: 0.11 K/UL (ref 0.1–0.95)
MONOCYTES NFR BLD: 1 % (ref 2–12)
NEUTROPHILS NFR BLD: 92 % (ref 43–80)
NEUTS SEG NFR BLD: 11.43 K/UL (ref 1.8–7.3)
PLATELET # BLD AUTO: 343 K/UL (ref 130–450)
PMV BLD AUTO: 9.7 FL (ref 7–12)
POTASSIUM SERPL-SCNC: 4 MMOL/L (ref 3.5–5)
PROT SERPL-MCNC: 7.9 G/DL (ref 6.4–8.3)
RBC # BLD AUTO: 4.76 M/UL (ref 3.8–5.8)
RBC # BLD: ABNORMAL 10*6/UL
RSV RNA NPH QL NAA+NON-PROBE: NOT DETECTED
RV+EV RNA NPH QL NAA+NON-PROBE: NOT DETECTED
SARS-COV-2 RNA NPH QL NAA+NON-PROBE: NOT DETECTED
SODIUM SERPL-SCNC: 142 MMOL/L (ref 132–146)
SPECIMEN DESCRIPTION: NORMAL
TIME, STOOL #1: ABNORMAL
TROPONIN I SERPL HS-MCNC: 15 NG/L (ref 0–11)
TROPONIN I SERPL HS-MCNC: 18 NG/L (ref 0–11)
TROPONIN I SERPL HS-MCNC: 20 NG/L (ref 0–11)
WBC OTHER # BLD: 12.4 K/UL (ref 4.5–11.5)

## 2024-10-27 PROCEDURE — 83735 ASSAY OF MAGNESIUM: CPT

## 2024-10-27 PROCEDURE — 2580000003 HC RX 258: Performed by: NURSE PRACTITIONER

## 2024-10-27 PROCEDURE — 6370000000 HC RX 637 (ALT 250 FOR IP): Performed by: NURSE PRACTITIONER

## 2024-10-27 PROCEDURE — 93005 ELECTROCARDIOGRAM TRACING: CPT

## 2024-10-27 PROCEDURE — 85025 COMPLETE CBC W/AUTO DIFF WBC: CPT

## 2024-10-27 PROCEDURE — 80053 COMPREHEN METABOLIC PANEL: CPT

## 2024-10-27 PROCEDURE — 99285 EMERGENCY DEPT VISIT HI MDM: CPT

## 2024-10-27 PROCEDURE — 87077 CULTURE AEROBIC IDENTIFY: CPT

## 2024-10-27 PROCEDURE — 87329 GIARDIA AG IA: CPT

## 2024-10-27 PROCEDURE — 87427 SHIGA-LIKE TOXIN AG IA: CPT

## 2024-10-27 PROCEDURE — 82705 FATS/LIPIDS FECES QUAL: CPT

## 2024-10-27 PROCEDURE — 83690 ASSAY OF LIPASE: CPT

## 2024-10-27 PROCEDURE — 6360000002 HC RX W HCPCS

## 2024-10-27 PROCEDURE — 96374 THER/PROPH/DIAG INJ IV PUSH: CPT

## 2024-10-27 PROCEDURE — 2060000000 HC ICU INTERMEDIATE R&B

## 2024-10-27 PROCEDURE — 83605 ASSAY OF LACTIC ACID: CPT

## 2024-10-27 PROCEDURE — 82270 OCCULT BLOOD FECES: CPT

## 2024-10-27 PROCEDURE — 87045 FECES CULTURE AEROBIC BACT: CPT

## 2024-10-27 PROCEDURE — 74177 CT ABD & PELVIS W/CONTRAST: CPT

## 2024-10-27 PROCEDURE — 93005 ELECTROCARDIOGRAM TRACING: CPT | Performed by: EMERGENCY MEDICINE

## 2024-10-27 PROCEDURE — 0202U NFCT DS 22 TRGT SARS-COV-2: CPT

## 2024-10-27 PROCEDURE — 6360000004 HC RX CONTRAST MEDICATION: Performed by: RADIOLOGY

## 2024-10-27 PROCEDURE — 84484 ASSAY OF TROPONIN QUANT: CPT

## 2024-10-27 PROCEDURE — 2580000003 HC RX 258: Performed by: EMERGENCY MEDICINE

## 2024-10-27 PROCEDURE — 87449 NOS EACH ORGANISM AG IA: CPT

## 2024-10-27 PROCEDURE — 87046 STOOL CULTR AEROBIC BACT EA: CPT

## 2024-10-27 PROCEDURE — 87425 ROTAVIRUS AG IA: CPT

## 2024-10-27 PROCEDURE — 87324 CLOSTRIDIUM AG IA: CPT

## 2024-10-27 RX ORDER — ASPIRIN 81 MG/1
81 TABLET ORAL DAILY
Status: CANCELLED | OUTPATIENT
Start: 2024-10-27

## 2024-10-27 RX ORDER — SODIUM CHLORIDE 0.9 % (FLUSH) 0.9 %
10 SYRINGE (ML) INJECTION PRN
Status: DISCONTINUED | OUTPATIENT
Start: 2024-10-27 | End: 2024-10-31 | Stop reason: HOSPADM

## 2024-10-27 RX ORDER — MEGESTROL ACETATE 20 MG/1
20 TABLET ORAL 3 TIMES DAILY
Status: CANCELLED | OUTPATIENT
Start: 2024-10-27

## 2024-10-27 RX ORDER — L. ACIDOPHILUS/PECTIN, CITRUS 25MM-100MG
0.5 TABLET ORAL DAILY
Status: CANCELLED | OUTPATIENT
Start: 2024-10-27

## 2024-10-27 RX ORDER — ACETAMINOPHEN 325 MG/1
650 TABLET ORAL EVERY 6 HOURS PRN
Status: DISCONTINUED | OUTPATIENT
Start: 2024-10-27 | End: 2024-10-31 | Stop reason: HOSPADM

## 2024-10-27 RX ORDER — SODIUM CHLORIDE 9 MG/ML
INJECTION, SOLUTION INTRAVENOUS CONTINUOUS
Status: DISCONTINUED | OUTPATIENT
Start: 2024-10-27 | End: 2024-10-30

## 2024-10-27 RX ORDER — SODIUM BICARBONATE 650 MG/1
650 TABLET ORAL 2 TIMES DAILY
Status: CANCELLED | OUTPATIENT
Start: 2024-10-27

## 2024-10-27 RX ORDER — ONDANSETRON 4 MG/1
4 TABLET, ORALLY DISINTEGRATING ORAL EVERY 8 HOURS PRN
Status: DISCONTINUED | OUTPATIENT
Start: 2024-10-27 | End: 2024-10-31 | Stop reason: HOSPADM

## 2024-10-27 RX ORDER — LANOLIN ALCOHOL/MO/W.PET/CERES
3 CREAM (GRAM) TOPICAL
Status: CANCELLED | OUTPATIENT
Start: 2024-10-27

## 2024-10-27 RX ORDER — MIRTAZAPINE 15 MG/1
15 TABLET, FILM COATED ORAL NIGHTLY
Status: DISCONTINUED | OUTPATIENT
Start: 2024-10-27 | End: 2024-10-31 | Stop reason: HOSPADM

## 2024-10-27 RX ORDER — 0.9 % SODIUM CHLORIDE 0.9 %
500 INTRAVENOUS SOLUTION INTRAVENOUS ONCE
Status: COMPLETED | OUTPATIENT
Start: 2024-10-27 | End: 2024-10-27

## 2024-10-27 RX ORDER — POLYETHYLENE GLYCOL 3350 17 G/17G
17 POWDER, FOR SOLUTION ORAL DAILY PRN
Status: DISCONTINUED | OUTPATIENT
Start: 2024-10-27 | End: 2024-10-31 | Stop reason: HOSPADM

## 2024-10-27 RX ORDER — SODIUM CHLORIDE 0.9 % (FLUSH) 0.9 %
5-40 SYRINGE (ML) INJECTION EVERY 12 HOURS SCHEDULED
Status: DISCONTINUED | OUTPATIENT
Start: 2024-10-27 | End: 2024-10-31 | Stop reason: HOSPADM

## 2024-10-27 RX ORDER — SODIUM CHLORIDE 0.9 % (FLUSH) 0.9 %
10 SYRINGE (ML) INJECTION PRN
Status: CANCELLED | OUTPATIENT
Start: 2024-10-27

## 2024-10-27 RX ORDER — POTASSIUM CHLORIDE 1500 MG/1
40 TABLET, EXTENDED RELEASE ORAL PRN
Status: DISPENSED | OUTPATIENT
Start: 2024-10-27 | End: 2024-10-29

## 2024-10-27 RX ORDER — FERROUS SULFATE 325(65) MG
325 TABLET ORAL
Status: CANCELLED | OUTPATIENT
Start: 2024-10-28

## 2024-10-27 RX ORDER — LACTOBACILLUS RHAMNOSUS GG 10B CELL
1 CAPSULE ORAL DAILY
Status: DISCONTINUED | OUTPATIENT
Start: 2024-10-28 | End: 2024-10-31 | Stop reason: HOSPADM

## 2024-10-27 RX ORDER — ONDANSETRON 2 MG/ML
4 INJECTION INTRAMUSCULAR; INTRAVENOUS EVERY 6 HOURS PRN
Status: CANCELLED | OUTPATIENT
Start: 2024-10-27

## 2024-10-27 RX ORDER — LEVOTHYROXINE SODIUM 100 UG/1
100 TABLET ORAL DAILY
Status: DISCONTINUED | OUTPATIENT
Start: 2024-10-28 | End: 2024-10-31 | Stop reason: HOSPADM

## 2024-10-27 RX ORDER — FENTANYL CITRATE 50 UG/ML
25 INJECTION, SOLUTION INTRAMUSCULAR; INTRAVENOUS ONCE
Status: COMPLETED | OUTPATIENT
Start: 2024-10-27 | End: 2024-10-27

## 2024-10-27 RX ORDER — POTASSIUM CHLORIDE 1500 MG/1
40 TABLET, EXTENDED RELEASE ORAL PRN
Status: CANCELLED | OUTPATIENT
Start: 2024-10-27 | End: 2024-10-29

## 2024-10-27 RX ORDER — POLYETHYLENE GLYCOL 3350 17 G/17G
17 POWDER, FOR SOLUTION ORAL DAILY PRN
Status: CANCELLED | OUTPATIENT
Start: 2024-10-27

## 2024-10-27 RX ORDER — PANTOPRAZOLE SODIUM 40 MG/1
40 TABLET, DELAYED RELEASE ORAL DAILY
Status: DISCONTINUED | OUTPATIENT
Start: 2024-10-28 | End: 2024-10-31 | Stop reason: HOSPADM

## 2024-10-27 RX ORDER — POTASSIUM CHLORIDE 7.45 MG/ML
10 INJECTION INTRAVENOUS PRN
Status: ACTIVE | OUTPATIENT
Start: 2024-10-27 | End: 2024-10-29

## 2024-10-27 RX ORDER — PANTOPRAZOLE SODIUM 40 MG/1
40 TABLET, DELAYED RELEASE ORAL DAILY
Status: CANCELLED | OUTPATIENT
Start: 2024-10-27

## 2024-10-27 RX ORDER — MAGNESIUM SULFATE IN WATER 40 MG/ML
2000 INJECTION, SOLUTION INTRAVENOUS PRN
Status: DISCONTINUED | OUTPATIENT
Start: 2024-10-27 | End: 2024-10-31 | Stop reason: HOSPADM

## 2024-10-27 RX ORDER — ROSUVASTATIN CALCIUM 10 MG/1
10 TABLET, COATED ORAL NIGHTLY
Status: DISCONTINUED | OUTPATIENT
Start: 2024-10-27 | End: 2024-10-31 | Stop reason: HOSPADM

## 2024-10-27 RX ORDER — ENOXAPARIN SODIUM 100 MG/ML
40 INJECTION SUBCUTANEOUS DAILY
Status: CANCELLED | OUTPATIENT
Start: 2024-10-27

## 2024-10-27 RX ORDER — SODIUM CHLORIDE 9 MG/ML
INJECTION, SOLUTION INTRAVENOUS PRN
Status: CANCELLED | OUTPATIENT
Start: 2024-10-27

## 2024-10-27 RX ORDER — ACETAMINOPHEN 650 MG/1
650 SUPPOSITORY RECTAL EVERY 6 HOURS PRN
Status: DISCONTINUED | OUTPATIENT
Start: 2024-10-27 | End: 2024-10-31 | Stop reason: HOSPADM

## 2024-10-27 RX ORDER — FENTANYL CITRATE 50 UG/ML
25 INJECTION, SOLUTION INTRAMUSCULAR; INTRAVENOUS ONCE
Status: DISCONTINUED | OUTPATIENT
Start: 2024-10-27 | End: 2024-10-27 | Stop reason: HOSPADM

## 2024-10-27 RX ORDER — PRIMIDONE 250 MG/1
125 TABLET ORAL 2 TIMES DAILY
Status: DISCONTINUED | OUTPATIENT
Start: 2024-10-27 | End: 2024-10-31 | Stop reason: HOSPADM

## 2024-10-27 RX ORDER — ONDANSETRON 2 MG/ML
4 INJECTION INTRAMUSCULAR; INTRAVENOUS EVERY 6 HOURS PRN
Status: DISCONTINUED | OUTPATIENT
Start: 2024-10-27 | End: 2024-10-31 | Stop reason: HOSPADM

## 2024-10-27 RX ORDER — SODIUM BICARBONATE 650 MG/1
650 TABLET ORAL 2 TIMES DAILY
Status: DISCONTINUED | OUTPATIENT
Start: 2024-10-27 | End: 2024-10-31 | Stop reason: HOSPADM

## 2024-10-27 RX ORDER — SODIUM CHLORIDE 0.9 % (FLUSH) 0.9 %
5-40 SYRINGE (ML) INJECTION EVERY 12 HOURS SCHEDULED
Status: CANCELLED | OUTPATIENT
Start: 2024-10-27

## 2024-10-27 RX ORDER — LANOLIN ALCOHOL/MO/W.PET/CERES
3 CREAM (GRAM) TOPICAL
Status: DISCONTINUED | OUTPATIENT
Start: 2024-10-27 | End: 2024-10-31 | Stop reason: HOSPADM

## 2024-10-27 RX ORDER — FERROUS SULFATE 325(65) MG
325 TABLET ORAL
Status: DISCONTINUED | OUTPATIENT
Start: 2024-10-28 | End: 2024-10-31 | Stop reason: HOSPADM

## 2024-10-27 RX ORDER — POTASSIUM CHLORIDE 7.45 MG/ML
10 INJECTION INTRAVENOUS PRN
Status: CANCELLED | OUTPATIENT
Start: 2024-10-27 | End: 2024-10-29

## 2024-10-27 RX ORDER — ONDANSETRON 4 MG/1
4 TABLET, ORALLY DISINTEGRATING ORAL EVERY 8 HOURS PRN
Status: CANCELLED | OUTPATIENT
Start: 2024-10-27

## 2024-10-27 RX ORDER — ACETAMINOPHEN 325 MG/1
650 TABLET ORAL EVERY 6 HOURS PRN
Status: CANCELLED | OUTPATIENT
Start: 2024-10-27

## 2024-10-27 RX ORDER — ACETAMINOPHEN 650 MG/1
650 SUPPOSITORY RECTAL EVERY 6 HOURS PRN
Status: CANCELLED | OUTPATIENT
Start: 2024-10-27

## 2024-10-27 RX ORDER — ASPIRIN 81 MG/1
81 TABLET ORAL DAILY
Status: DISCONTINUED | OUTPATIENT
Start: 2024-10-28 | End: 2024-10-28

## 2024-10-27 RX ORDER — ENOXAPARIN SODIUM 100 MG/ML
40 INJECTION SUBCUTANEOUS DAILY
Status: DISCONTINUED | OUTPATIENT
Start: 2024-10-28 | End: 2024-10-28 | Stop reason: DRUGHIGH

## 2024-10-27 RX ORDER — MIRTAZAPINE 15 MG/1
15 TABLET, FILM COATED ORAL NIGHTLY
Status: CANCELLED | OUTPATIENT
Start: 2024-10-27

## 2024-10-27 RX ORDER — CETIRIZINE HYDROCHLORIDE 10 MG/1
5 TABLET ORAL DAILY
Status: DISCONTINUED | OUTPATIENT
Start: 2024-10-28 | End: 2024-10-31 | Stop reason: HOSPADM

## 2024-10-27 RX ORDER — PRIMIDONE 250 MG/1
125 TABLET ORAL 2 TIMES DAILY
Status: CANCELLED | OUTPATIENT
Start: 2024-10-27

## 2024-10-27 RX ORDER — LEVOTHYROXINE SODIUM 100 UG/1
100 TABLET ORAL DAILY
Status: CANCELLED | OUTPATIENT
Start: 2024-10-27

## 2024-10-27 RX ORDER — CETIRIZINE HYDROCHLORIDE 10 MG/1
5 TABLET ORAL DAILY
Status: CANCELLED | OUTPATIENT
Start: 2024-10-27

## 2024-10-27 RX ORDER — MAGNESIUM SULFATE IN WATER 40 MG/ML
2000 INJECTION, SOLUTION INTRAVENOUS PRN
Status: CANCELLED | OUTPATIENT
Start: 2024-10-27

## 2024-10-27 RX ORDER — SODIUM CHLORIDE 9 MG/ML
INJECTION, SOLUTION INTRAVENOUS PRN
Status: DISCONTINUED | OUTPATIENT
Start: 2024-10-27 | End: 2024-10-31 | Stop reason: HOSPADM

## 2024-10-27 RX ORDER — ROSUVASTATIN CALCIUM 10 MG/1
10 TABLET, COATED ORAL NIGHTLY
Status: CANCELLED | OUTPATIENT
Start: 2024-10-27

## 2024-10-27 RX ORDER — IOPAMIDOL 755 MG/ML
75 INJECTION, SOLUTION INTRAVASCULAR
Status: COMPLETED | OUTPATIENT
Start: 2024-10-27 | End: 2024-10-27

## 2024-10-27 RX ORDER — SODIUM CHLORIDE 9 MG/ML
INJECTION, SOLUTION INTRAVENOUS CONTINUOUS
Status: CANCELLED | OUTPATIENT
Start: 2024-10-27

## 2024-10-27 RX ORDER — MEGESTROL ACETATE 20 MG/1
20 TABLET ORAL 3 TIMES DAILY
Status: DISCONTINUED | OUTPATIENT
Start: 2024-10-27 | End: 2024-10-31 | Stop reason: HOSPADM

## 2024-10-27 RX ADMIN — FENTANYL CITRATE 25 MCG: 50 INJECTION INTRAMUSCULAR; INTRAVENOUS at 06:20

## 2024-10-27 RX ADMIN — SODIUM CHLORIDE, PRESERVATIVE FREE 10 ML: 5 INJECTION INTRAVENOUS at 23:32

## 2024-10-27 RX ADMIN — IOPAMIDOL 75 ML: 755 INJECTION, SOLUTION INTRAVENOUS at 07:54

## 2024-10-27 RX ADMIN — SODIUM CHLORIDE 500 ML: 9 INJECTION, SOLUTION INTRAVENOUS at 11:48

## 2024-10-27 RX ADMIN — MIRTAZAPINE 15 MG: 15 TABLET, FILM COATED ORAL at 23:32

## 2024-10-27 RX ADMIN — MEGESTROL ACETATE 20 MG: 20 TABLET ORAL at 23:32

## 2024-10-27 RX ADMIN — PRIMIDONE 125 MG: 250 TABLET ORAL at 23:32

## 2024-10-27 RX ADMIN — SODIUM BICARBONATE 650 MG: 650 TABLET ORAL at 23:32

## 2024-10-27 RX ADMIN — ROSUVASTATIN CALCIUM 10 MG: 10 TABLET, FILM COATED ORAL at 23:40

## 2024-10-27 RX ADMIN — Medication 3 MG: at 23:32

## 2024-10-27 RX ADMIN — SODIUM CHLORIDE: 9 INJECTION, SOLUTION INTRAVENOUS at 23:35

## 2024-10-27 ASSESSMENT — LIFESTYLE VARIABLES
HOW MANY STANDARD DRINKS CONTAINING ALCOHOL DO YOU HAVE ON A TYPICAL DAY: PATIENT DOES NOT DRINK
HOW OFTEN DO YOU HAVE A DRINK CONTAINING ALCOHOL: NEVER

## 2024-10-27 ASSESSMENT — PAIN DESCRIPTION - LOCATION
LOCATION: ABDOMEN
LOCATION: ABDOMEN;HEAD

## 2024-10-27 ASSESSMENT — PAIN DESCRIPTION - ORIENTATION: ORIENTATION: LEFT;LOWER

## 2024-10-27 ASSESSMENT — PAIN SCALES - GENERAL
PAINLEVEL_OUTOF10: 0
PAINLEVEL_OUTOF10: 6
PAINLEVEL_OUTOF10: 0
PAINLEVEL_OUTOF10: 7

## 2024-10-27 ASSESSMENT — PAIN DESCRIPTION - DESCRIPTORS: DESCRIPTORS: ACHING

## 2024-10-27 NOTE — ED PROVIDER NOTES
[facility] EMERGENCY DEPARTMENT  EMERGENCY DEPARTMENT ENCOUNTER        Pt Name: Rikki García  MRN: [id number]  Birthdate 1950  Date of evaluation: 10/27/2024  Provider: Elvin German DO  PCP: John Mayo DO  Note Started: 7:19 AM EDT 10/27/24    CHIEF COMPLAINT       Chief Complaint   Patient presents with    Abdominal Pain     Pt complaint of abdominal pain that started yesterday. Nursing facility stated pt had clear liquid emesis and liquid diarrhea. Hx of diverticulitis.        HISTORY OF PRESENT ILLNESS: 1 or more Elements   History From: PATIENT     Limitations to history : None    Rikki García is a 74 y.o. male with prior history of hypothyroidism, GI bleed, atrial fibrillation arriving with the complaint of lower quadrant abdominal pain.  He states his pain started about 4 hours ago.  He denies any associated fevers, nausea, vomiting or changes in bowel movements.  He is not on a blood thinner.  In April of this year about 6 months ago he had coffee-ground emesis.      Nursing Notes were all reviewed and agreed with or any disagreements were addressed in the HPI.    REVIEW OF SYSTEMS :      Review of Systems    POSITIVE (+): abdominal pain  NEGATIVE (-): fevers, chills, nausea, vomiting, diarrhea, constipation, shortness of breath, chest pain      SURGICAL HISTORY     Past Surgical History:   Procedure Laterality Date    CHOLECYSTECTOMY      UPPER GASTROINTESTINAL ENDOSCOPY N/A 4/12/2024    ESOPHAGOGASTRODUODENOSCOPY performed by Bry Johnson MD at [facility] ENDOSCOPY       CURRENTMEDICATIONS       Previous Medications    ACETAMINOPHEN (TYLENOL) 325 MG TABLET    Take 2 tablets by mouth every 4 hours as needed for Pain or Fever    ALUMINUM & MAGNESIUM HYDROXIDE-SIMETHICONE (MAALOX) 200-200-20 MG/5ML SUSP SUSPENSION    Take 20 mLs by mouth every 4 hours as needed for Indigestion    ASPIRIN LOW DOSE 81 MG EC TABLET    Take 1 tablet by mouth daily    BISACODYL

## 2024-10-27 NOTE — ED NOTES
Patient incontinent of large green liquid stool. Staff cleaned patient up and bed change provided.

## 2024-10-28 LAB
ANION GAP SERPL CALCULATED.3IONS-SCNC: 10 MMOL/L (ref 7–16)
BASOPHILS # BLD: 0.05 K/UL (ref 0–0.2)
BASOPHILS NFR BLD: 1 % (ref 0–2)
BUN SERPL-MCNC: 25 MG/DL (ref 6–23)
CALCIUM SERPL-MCNC: 8.9 MG/DL (ref 8.6–10.2)
CHLORIDE SERPL-SCNC: 110 MMOL/L (ref 98–107)
CO2 SERPL-SCNC: 19 MMOL/L (ref 22–29)
CREAT SERPL-MCNC: 0.8 MG/DL (ref 0.7–1.2)
EKG ATRIAL RATE: 88 BPM
EKG ATRIAL RATE: 89 BPM
EKG P AXIS: 74 DEGREES
EKG P AXIS: 78 DEGREES
EKG P-R INTERVAL: 168 MS
EKG P-R INTERVAL: 176 MS
EKG Q-T INTERVAL: 362 MS
EKG Q-T INTERVAL: 368 MS
EKG QRS DURATION: 72 MS
EKG QRS DURATION: 74 MS
EKG QTC CALCULATION (BAZETT): 440 MS
EKG QTC CALCULATION (BAZETT): 445 MS
EKG R AXIS: 71 DEGREES
EKG R AXIS: 77 DEGREES
EKG T AXIS: -93 DEGREES
EKG T AXIS: 89 DEGREES
EKG VENTRICULAR RATE: 88 BPM
EKG VENTRICULAR RATE: 89 BPM
EOSINOPHIL # BLD: 0.02 K/UL (ref 0.05–0.5)
EOSINOPHILS RELATIVE PERCENT: 0 % (ref 0–6)
ERYTHROCYTE [DISTWIDTH] IN BLOOD BY AUTOMATED COUNT: 14.3 % (ref 11.5–15)
G LAMBLIA AG STL QL IA: NEGATIVE
GFR, ESTIMATED: >90 ML/MIN/1.73M2
GLUCOSE SERPL-MCNC: 133 MG/DL (ref 74–99)
HCT VFR BLD AUTO: 41.1 % (ref 37–54)
HGB BLD-MCNC: 13.6 G/DL (ref 12.5–16.5)
IMM GRANULOCYTES # BLD AUTO: <0.03 K/UL (ref 0–0.58)
IMM GRANULOCYTES NFR BLD: 0 % (ref 0–5)
LYMPHOCYTES NFR BLD: 0.97 K/UL (ref 1.5–4)
LYMPHOCYTES RELATIVE PERCENT: 13 % (ref 20–42)
MCH RBC QN AUTO: 31.5 PG (ref 26–35)
MCHC RBC AUTO-ENTMCNC: 33.1 G/DL (ref 32–34.5)
MCV RBC AUTO: 95.1 FL (ref 80–99.9)
MONOCYTES NFR BLD: 0.65 K/UL (ref 0.1–0.95)
MONOCYTES NFR BLD: 9 % (ref 2–12)
NEUTROPHILS NFR BLD: 78 % (ref 43–80)
NEUTS SEG NFR BLD: 5.93 K/UL (ref 1.8–7.3)
PLATELET # BLD AUTO: 319 K/UL (ref 130–450)
PMV BLD AUTO: 9.5 FL (ref 7–12)
POTASSIUM SERPL-SCNC: 3.6 MMOL/L (ref 3.5–5)
RBC # BLD AUTO: 4.32 M/UL (ref 3.8–5.8)
ROTAVIRUS ANTIGEN: NEGATIVE
SODIUM SERPL-SCNC: 139 MMOL/L (ref 132–146)
SOURCE, 60200063: NORMAL
SOURCE: NORMAL
SPECIMEN DESCRIPTION: NORMAL
WBC OTHER # BLD: 7.6 K/UL (ref 4.5–11.5)

## 2024-10-28 PROCEDURE — 93010 ELECTROCARDIOGRAM REPORT: CPT | Performed by: INTERNAL MEDICINE

## 2024-10-28 PROCEDURE — 6370000000 HC RX 637 (ALT 250 FOR IP): Performed by: NURSE PRACTITIONER

## 2024-10-28 PROCEDURE — 6360000002 HC RX W HCPCS: Performed by: NURSE PRACTITIONER

## 2024-10-28 PROCEDURE — 80048 BASIC METABOLIC PNL TOTAL CA: CPT

## 2024-10-28 PROCEDURE — 2060000000 HC ICU INTERMEDIATE R&B

## 2024-10-28 PROCEDURE — 97161 PT EVAL LOW COMPLEX 20 MIN: CPT

## 2024-10-28 PROCEDURE — 85025 COMPLETE CBC W/AUTO DIFF WBC: CPT

## 2024-10-28 PROCEDURE — 2580000003 HC RX 258: Performed by: NURSE PRACTITIONER

## 2024-10-28 RX ORDER — CEFDINIR 300 MG/1
300 CAPSULE ORAL EVERY 12 HOURS SCHEDULED
Status: DISCONTINUED | OUTPATIENT
Start: 2024-10-28 | End: 2024-10-31 | Stop reason: HOSPADM

## 2024-10-28 RX ORDER — CODEINE PHOSPHATE/GUAIFENESIN 10-100MG/5
10 LIQUID (ML) ORAL EVERY 4 HOURS PRN
COMMUNITY

## 2024-10-28 RX ORDER — ENOXAPARIN SODIUM 100 MG/ML
30 INJECTION SUBCUTANEOUS DAILY
Status: DISCONTINUED | OUTPATIENT
Start: 2024-10-28 | End: 2024-10-31 | Stop reason: DRUGHIGH

## 2024-10-28 RX ORDER — METRONIDAZOLE 500 MG/1
500 TABLET ORAL 3 TIMES DAILY
Status: ON HOLD | COMMUNITY
End: 2024-10-31 | Stop reason: HOSPADM

## 2024-10-28 RX ORDER — METRONIDAZOLE 500 MG/1
500 TABLET ORAL EVERY 8 HOURS SCHEDULED
Status: DISCONTINUED | OUTPATIENT
Start: 2024-10-28 | End: 2024-10-31 | Stop reason: HOSPADM

## 2024-10-28 RX ORDER — CIPROFLOXACIN 500 MG/1
500 TABLET, FILM COATED ORAL 2 TIMES DAILY
Status: ON HOLD | COMMUNITY
End: 2024-10-31 | Stop reason: HOSPADM

## 2024-10-28 RX ADMIN — CEFDINIR 300 MG: 300 CAPSULE ORAL at 13:45

## 2024-10-28 RX ADMIN — SODIUM CHLORIDE: 9 INJECTION, SOLUTION INTRAVENOUS at 15:18

## 2024-10-28 RX ADMIN — LEVOTHYROXINE SODIUM 100 MCG: 100 TABLET ORAL at 06:30

## 2024-10-28 RX ADMIN — MEGESTROL ACETATE 20 MG: 20 TABLET ORAL at 15:39

## 2024-10-28 RX ADMIN — METRONIDAZOLE 500 MG: 500 TABLET ORAL at 15:39

## 2024-10-28 ASSESSMENT — PAIN SCALES - GENERAL
PAINLEVEL_OUTOF10: 0
PAINLEVEL_OUTOF10: 0

## 2024-10-28 NOTE — ACP (ADVANCE CARE PLANNING)
Advance Care Planning   Healthcare Decision Maker:    Primary Decision Maker: Shelbie Gardner - Niece/Nephew - [phone]    Click here to complete Healthcare Decision Makers including selection of the Healthcare Decision Maker Relationship (ie \"Primary\").

## 2024-10-28 NOTE — PROGRESS NOTES
Physical Therapy  Facility/Department: [address]  Physical Therapy Initial Assessment    Name: Rikki García  : 1950  MRN: [id number]  Date of Service: 10/28/2024           Patient Diagnosis(es): dehydration  Past Medical History:  has a past medical history of A-fib (HCC), Cognitive impairment, Cystoma, Heart failure (HCC), Hyperlipidemia, and Hypothyroid.  Past Surgical History:  has a past surgical history that includes Cholecystectomy and Upper gastrointestinal endoscopy (N/A, 2024).        Evaluating Therapist: Andreina Edouard PT    Room #:  0433/0433-A  Diagnosis:  Dehydration [E86.0]  PMHx/PSHx:  Afib, heart failure  Precautions:  falls, alarm, contact isolation      Social:  Pt admitted from facility. States he ambulates with rollator   Initial Evaluation  Date: 10/28/24 Treatment      Short Term/ Long Term   Goals   Was pt agreeable to Eval/treatment? With encouragement     Does pt have pain? No c/o pain     Bed Mobility  Rolling: mod assist  Supine to sit: mod assist  Sit to supine: NT  Scooting: mod assist  SBA   Transfers Sit to stand: mod assist  Stand to sit: mod assist  Stand pivot: mod assist  Min assist   Ambulation    A few steps bed to chair with ww mod assist  50 feet with ww with min assist   Stair Negotiation  Ascended and descended  NT   N/A   LE strength     Grossly 3/5    3+/5   balance      Fair-     AM-PAC Raw score                        Pt is alert and oriented to person  LE ROM: WFL  Edema: none  Endurance: fair-  Chair alarm: yes     ASSESSMENT:    Pt displays functional ability as noted in the objective portion of this evaluation.      Patient education  Pt educated on importance of mobility    Patient response to education:   Pt verbalized understanding Pt demonstrated skill Pt requires further education in this area   no   yes       Comments:  Pt required encouragement to participate.  Did agree to get up to chair. Pt unstady in standing with mod assist for  balance. Fatigues quickly with mobility.     Pt's/ family goals   1. None stated    Conditions Requiring Skilled Therapeutic Intervention:    [x]Decreased strength     []Decreased ROM  [x]Decreased functional mobility  [x]Decreased balance   [x]Decreased endurance   []Decreased posture  []Decreased sensation  []Decreased coordination   []Decreased vision  []Decreased safety awareness   []Increased pain       Patient and or family understand(s) diagnosis, prognosis, and plan of care. no    Prognosis is good for reaching above PT goals    PHYSICAL THERAPY PLAN OF CARE:    PT POC is established based on physician order and patient diagnosis     Referring provider/PT Order: Carolina Meyers APRN - CNP / PT eval and treat      Current Treatment Recommendations:     [x] Strengthening to improve independence with functional mobility   [] ROM to improve independence with functional mobility   [x] Balance Training to improve static/dynamic balance and to reduce fall risk  [x] Endurance Training to improve activity tolerance during functional mobility   [x] Transfer Training to improve safety and independence with all functional transfers   [x] Gait Training to improve gait mechanics, endurance and assess need for appropriate assistive device  [] Stair Training in preparation for safe discharge home and/or into the community   [x] Positioning to prevent skin breakdown and contractures  [x] Safety and Education Training   [x] Patient/Caregiver Education   [] HEP  [] Other     PT long term treatment goals are located in above grid    Frequency of treatments: 2-5x/week x 5 days.    Time in  0925  Time out  0940        Evaluation Time includes thorough review of current medical information, gathering information on past medical history/social history and prior level of function, completion of standardized testing/informal observation of tasks, assessment of data and education on plan of care and goals.      CPT codes:  [x] Low

## 2024-10-28 NOTE — PROGRESS NOTES
Call placed to the facility to update on patient admission from [facility] and requested a list of patient medication. Fax number provided, awaiting return fax.

## 2024-10-28 NOTE — CARE COORDINATION
Social Work/Discharge Planning:  Patient admitted from [facility] at [facility] LOC.  Called liaison Natalie with [facility] and confirmed patient is a LTC bed hold.  Called patient soni Morfin (ph: [phone]) and completed initial assessment.  Explained Social Work role and discussed transition of care/discharge planning.  Shelbie plans for patient to return to [facility] at discharge.  PTA patient uses a walker at facility.  Electronic N-17 in epic and transport form in soft chart.  Will continue to follow and assist with discharge planning.  Electronically signed by KENYON Kc on 10/28/2024 at 3:39 PM

## 2024-10-28 NOTE — PLAN OF CARE
Problem: Discharge Planning  Goal: Discharge to home or other facility with appropriate resources  10/28/2024 1308 by Nancy Polanco, RN  Outcome: Progressing     Problem: Skin/Tissue Integrity  Goal: Absence of new skin breakdown  Description: 1.  Monitor for areas of redness and/or skin breakdown  2.  Assess vascular access sites hourly  3.  Every 4-6 hours minimum:  Change oxygen saturation probe site  4.  Every 4-6 hours:  If on nasal continuous positive airway pressure, respiratory therapy assess nares and determine need for appliance change or resting period.  10/28/2024 1308 by Nancy Polanco, RN  Outcome: Progressing     Problem: Safety - Adult  Goal: Free from fall injury  10/28/2024 1308 by Nancy Polanco, RN  Outcome: Progressing     Problem: ABCDS Injury Assessment  Goal: Absence of physical injury  10/28/2024 1308 by Nancy Polanco, RN  Outcome: Progressing

## 2024-10-28 NOTE — PROGRESS NOTES
Pt is currently refusing medication pass and head to toe assessment. Will try again at a different time today 10/28

## 2024-10-28 NOTE — PROGRESS NOTES
DVT Prophylaxis Adjustment Policy (DVT Prophylaxis)     This patient is on DVT Prophylaxis medication that requires a dose adjustment      Date Body Weight IBW  Adjusted BW SCr  CrCl Dialysis status   10/28/2024 47.7 kg (105 lb 3.2 oz) Ideal body weight: 53.5 kg (117 lb 14 oz) Serum creatinine: 0.8 mg/dL 10/28/24 0210  Estimated creatinine clearance: 56 mL/min N/a       Pharmacy has dose-adjusted the DVT Prophylaxis regimen to match   the recommendations from the following table        Ordered Medication:Lovenox 40mg daily    Order Changed/converted to: Lovenox 30mg daily      These changes were made per protocol according to the [facility] Pharmacist   Review for Appropriate Use and Automatic Dose Adjustments of   Subcutaneous Anticoagulants Policy     *Please note this dose may need readjusted if patient's condition changes.    Please contact pharmacy with any questions regarding these changes.    adair dietz RPH  10/28/2024  6:39 AM

## 2024-10-28 NOTE — H&P
[facility]  History and Physical      CHIEF COMPLAINT:    No chief complaint on file.       Patient of Gael  DO ALYSE presents with:  Dehydration    History of Present Illness:   Patient is a 74-year-old male with a past medical history of atrial fibrillation, heart failure, HLD, hypothyroidism who presents to the emergency room from his nursing facility for complaints of abdominal pain and diarrhea.  Labs on arrival revealed an elevated troponin 15-20-18, leukocytosis of 12.4. A CT abdomen pelvis was obtained and revealed fluid-filled distended small bowel loops suggesting enterocolitis with no evidence of diverticulitis.  Patient was admitted to [facility] with telemetry at [facility] for further workup and treatment.  On evaluation he is comfortable, not very responsive to questions.  Aide at bedside tells me that he has been having a fair amount of diarrhea this morning requiring cleanup.  He has baseline dementia and really not able to answer questions for me denies any abdominal pain but is curled up in a fetal position on evaluation.    REVIEW OF SYSTEMS:  Pertinent negatives are above in HPI.  10 point ROS otherwise negative.      Past Medical History:   Diagnosis Date    A-fib (HCC)     Cognitive impairment     Cystoma     Heart failure (HCC)     Hyperlipidemia     Hypothyroid          Past Surgical History:   Procedure Laterality Date    CHOLECYSTECTOMY      UPPER GASTROINTESTINAL ENDOSCOPY N/A 4/12/2024    ESOPHAGOGASTRODUODENOSCOPY performed by Bry Johnson MD at [facility] ENDOSCOPY       Medications Prior to Admission:    Medications Prior to Admission: ciprofloxacin (CIPRO) 500 MG tablet, Take 1 tablet by mouth 2 times daily  metroNIDAZOLE (FLAGYL) 500 MG tablet, Take 1 tablet by mouth 3 times daily  melatonin 3 MG TABS tablet, Take 1 tablet by mouth nightly  Multiple Vitamins-Minerals (THERAPEUTIC MULTIVITAMIN-MINERALS) tablet, Take 1 tablet by mouth daily  sodium  BILITOT 0.3 10/27/2024 06:12 AM    ALKPHOS 61 10/27/2024 06:12 AM    AST 33 10/27/2024 06:12 AM    ALT 37 10/27/2024 06:12 AM       Imaging:  CT abdomen pelvis fluid-filled distended small bowel loops suggesting enterocolitis with no evidence of diverticulitis    EKG:  Completed but is submitted in epic  Telemetry:  I've personally reviewed the patient's telemetry:  Sinus    ASSESSMENT/PLAN:  Principal Problem:    Dehydration  Resolved Problems:    * No resolved hospital problems. *    Patient is a 74-year-old male admitted to [facility] for  Dehydration due to diarrhea secondary to enterocolitis  -Monitor labs  -WBC 12.4 > 7.6  -Continue IVF NS at 75  -P.o. Omnicef and Flagyl if able to tolerate otherwise transition to IV  -Clear liquid diet, advance as tolerated  -Stool studies to rule out C. Difficile pending still  -Antiemetics as needed  If diarrhea has slowed down by tomorrow, he may be discharged back to facility    Hypothyroidism  -Continue home levothyroxine    Medication for other comorbidities continue as appropriate dose adjustment as necessary.    DVT prophylaxis Lovenox  PT OT  Discharge planning      Code status: full  Requires inpatient level of care      I have spent a total time of 75 minutes of this patient encounter reviewing chart, labs, coordinating care with interdisciplinary teams, face to face encounter with patient, providing counseling/education to patient/family.      Maribel Werner MD  10/28/2024

## 2024-10-28 NOTE — PROGRESS NOTES
4 Eyes Skin Assessment     NAME:  Rikki García  YOB: 1950  MEDICAL RECORD NUMBER:  [id number]    The patient is being assessed for  Admission    I agree that at least one RN has performed a thorough Head to Toe Skin Assessment on the patient. ALL assessment sites listed below have been assessed.      Areas assessed by both nurses:    Head, Face, Ears, Shoulders, Back, Chest, Arms, Elbows, Hands, Sacrum. Buttock, Coccyx, Ischium, and Legs. Feet and Heels        Does the Patient have a Wound? No noted wound(s)       Chris Prevention initiated by RN: Yes  Wound Care Orders initiated by RN: No    Pressure Injury (Stage 3,4, Unstageable, DTI, NWPT, and Complex wounds) if present, place Wound referral order by RN under : No    New Ostomies, if present place, Ostomy referral order under : No     Nurse 1 eSignature: Electronically signed by Jada Judd RN on 10/28/24 at 1:47 AM EDT    **SHARE this note so that the co-signing nurse can place an eSignature**    Nurse 2 eSignature: {Esignature:[id number]}

## 2024-10-29 ENCOUNTER — APPOINTMENT (OUTPATIENT)
Dept: MRI IMAGING | Age: 74
End: 2024-10-29
Attending: INTERNAL MEDICINE
Payer: COMMERCIAL

## 2024-10-29 PROBLEM — E44.0 MODERATE PROTEIN-CALORIE MALNUTRITION (HCC): Chronic | Status: ACTIVE | Noted: 2024-10-29

## 2024-10-29 LAB
ANION GAP SERPL CALCULATED.3IONS-SCNC: 11 MMOL/L (ref 7–16)
BASOPHILS # BLD: 0.04 K/UL (ref 0–0.2)
BASOPHILS NFR BLD: 1 % (ref 0–2)
BUN SERPL-MCNC: 16 MG/DL (ref 6–23)
CALCIUM SERPL-MCNC: 8.2 MG/DL (ref 8.6–10.2)
CHLORIDE SERPL-SCNC: 110 MMOL/L (ref 98–107)
CO2 SERPL-SCNC: 18 MMOL/L (ref 22–29)
CREAT SERPL-MCNC: 0.7 MG/DL (ref 0.7–1.2)
EOSINOPHIL # BLD: 0.22 K/UL (ref 0.05–0.5)
EOSINOPHILS RELATIVE PERCENT: 4 % (ref 0–6)
ERYTHROCYTE [DISTWIDTH] IN BLOOD BY AUTOMATED COUNT: 14.1 % (ref 11.5–15)
GFR, ESTIMATED: >90 ML/MIN/1.73M2
GLUCOSE SERPL-MCNC: 87 MG/DL (ref 74–99)
HCT VFR BLD AUTO: 39.8 % (ref 37–54)
HGB BLD-MCNC: 13.5 G/DL (ref 12.5–16.5)
IMM GRANULOCYTES # BLD AUTO: <0.03 K/UL (ref 0–0.58)
IMM GRANULOCYTES NFR BLD: 0 % (ref 0–5)
LYMPHOCYTES NFR BLD: 1.52 K/UL (ref 1.5–4)
LYMPHOCYTES RELATIVE PERCENT: 24 % (ref 20–42)
MAGNESIUM SERPL-MCNC: 2.3 MG/DL (ref 1.6–2.6)
MCH RBC QN AUTO: 32 PG (ref 26–35)
MCHC RBC AUTO-ENTMCNC: 33.9 G/DL (ref 32–34.5)
MCV RBC AUTO: 94.3 FL (ref 80–99.9)
MICROORGANISM SPEC CULT: NORMAL
MICROORGANISM SPEC CULT: NORMAL
MONOCYTES NFR BLD: 0.76 K/UL (ref 0.1–0.95)
MONOCYTES NFR BLD: 12 % (ref 2–12)
NEUTROPHILS NFR BLD: 60 % (ref 43–80)
NEUTS SEG NFR BLD: 3.75 K/UL (ref 1.8–7.3)
PLATELET # BLD AUTO: 282 K/UL (ref 130–450)
PMV BLD AUTO: 9.9 FL (ref 7–12)
POTASSIUM SERPL-SCNC: 3.4 MMOL/L (ref 3.5–5)
RBC # BLD AUTO: 4.22 M/UL (ref 3.8–5.8)
SODIUM SERPL-SCNC: 139 MMOL/L (ref 132–146)
SPECIMEN DESCRIPTION: NORMAL
WBC OTHER # BLD: 6.3 K/UL (ref 4.5–11.5)

## 2024-10-29 PROCEDURE — 2580000003 HC RX 258: Performed by: NURSE PRACTITIONER

## 2024-10-29 PROCEDURE — 80048 BASIC METABOLIC PNL TOTAL CA: CPT

## 2024-10-29 PROCEDURE — 74183 MRI ABD W/O CNTR FLWD CNTR: CPT

## 2024-10-29 PROCEDURE — 85025 COMPLETE CBC W/AUTO DIFF WBC: CPT

## 2024-10-29 PROCEDURE — 6360000002 HC RX W HCPCS: Performed by: NURSE PRACTITIONER

## 2024-10-29 PROCEDURE — 83735 ASSAY OF MAGNESIUM: CPT

## 2024-10-29 PROCEDURE — A9579 GAD-BASE MR CONTRAST NOS,1ML: HCPCS | Performed by: RADIOLOGY

## 2024-10-29 PROCEDURE — 2060000000 HC ICU INTERMEDIATE R&B

## 2024-10-29 PROCEDURE — 6370000000 HC RX 637 (ALT 250 FOR IP): Performed by: NURSE PRACTITIONER

## 2024-10-29 PROCEDURE — 97530 THERAPEUTIC ACTIVITIES: CPT

## 2024-10-29 PROCEDURE — 6360000004 HC RX CONTRAST MEDICATION: Performed by: RADIOLOGY

## 2024-10-29 RX ADMIN — POTASSIUM CHLORIDE 40 MEQ: 1500 TABLET, EXTENDED RELEASE ORAL at 06:47

## 2024-10-29 RX ADMIN — MIRTAZAPINE 15 MG: 15 TABLET, FILM COATED ORAL at 21:00

## 2024-10-29 RX ADMIN — SODIUM BICARBONATE 650 MG: 650 TABLET ORAL at 21:00

## 2024-10-29 RX ADMIN — SODIUM BICARBONATE 650 MG: 650 TABLET ORAL at 09:10

## 2024-10-29 RX ADMIN — MEGESTROL ACETATE 20 MG: 20 TABLET ORAL at 21:00

## 2024-10-29 RX ADMIN — CETIRIZINE HYDROCHLORIDE 5 MG: 10 TABLET, FILM COATED ORAL at 09:10

## 2024-10-29 RX ADMIN — PRIMIDONE 125 MG: 250 TABLET ORAL at 21:00

## 2024-10-29 RX ADMIN — PRIMIDONE 125 MG: 250 TABLET ORAL at 09:09

## 2024-10-29 RX ADMIN — METRONIDAZOLE 500 MG: 500 TABLET ORAL at 21:00

## 2024-10-29 RX ADMIN — SODIUM CHLORIDE: 9 INJECTION, SOLUTION INTRAVENOUS at 05:29

## 2024-10-29 RX ADMIN — CEFDINIR 300 MG: 300 CAPSULE ORAL at 09:10

## 2024-10-29 RX ADMIN — Medication 3 MG: at 21:00

## 2024-10-29 RX ADMIN — SODIUM CHLORIDE: 9 INJECTION, SOLUTION INTRAVENOUS at 20:09

## 2024-10-29 RX ADMIN — METRONIDAZOLE 500 MG: 500 TABLET ORAL at 06:47

## 2024-10-29 RX ADMIN — MEGESTROL ACETATE 20 MG: 20 TABLET ORAL at 09:09

## 2024-10-29 RX ADMIN — METRONIDAZOLE 500 MG: 500 TABLET ORAL at 13:59

## 2024-10-29 RX ADMIN — SODIUM CHLORIDE, PRESERVATIVE FREE 10 ML: 5 INJECTION INTRAVENOUS at 21:00

## 2024-10-29 RX ADMIN — CEFDINIR 300 MG: 300 CAPSULE ORAL at 21:00

## 2024-10-29 RX ADMIN — Medication 1 CAPSULE: at 09:10

## 2024-10-29 RX ADMIN — LEVOTHYROXINE SODIUM 100 MCG: 100 TABLET ORAL at 06:47

## 2024-10-29 RX ADMIN — ROSUVASTATIN CALCIUM 10 MG: 10 TABLET, FILM COATED ORAL at 21:00

## 2024-10-29 RX ADMIN — GADOTERIDOL 10 ML: 279.3 INJECTION, SOLUTION INTRAVENOUS at 19:40

## 2024-10-29 RX ADMIN — MEGESTROL ACETATE 20 MG: 20 TABLET ORAL at 14:00

## 2024-10-29 RX ADMIN — PANTOPRAZOLE SODIUM 40 MG: 40 TABLET, DELAYED RELEASE ORAL at 09:10

## 2024-10-29 ASSESSMENT — PAIN SCALES - GENERAL: PAINLEVEL_OUTOF10: 0

## 2024-10-29 NOTE — PROGRESS NOTES
[facility]                                Progress note    Subjective:    The patient is awake and alert.    Resting comfortably in bed  Family bedside    Objective:    BP (!) 135/91   Pulse 71   Temp 97.6 °F (36.4 °C) (Oral)   Resp 16   Ht 1.524 m (5')   Wt 47.7 kg (105 lb 3.2 oz)   SpO2 99%   BMI 20.55 kg/m²     In: -   Out: 900   In: -   Out: 900 [Urine:900]    General appearance: NAD, conversant, frail   HEENT: AT/NC, MMM  Neck: FROM, supple  Lungs: Clear to auscultation  CV: RRR, no MRGs  Vasc: Radial pulses 2+  Abdomen: Soft, non-tender; no masses or HSM  Extremities: No peripheral edema or digital cyanosis  Skin: no rash, lesions or ulcers  Psych: Alert and oriented to person, place and time, intermit confusion  Neuro: Alert and interactive     Recent Labs     10/27/24  0612 10/28/24  0210 10/29/24  0347   WBC 12.4* 7.6 6.3   HGB 15.0 13.6 13.5   HCT 45.6 41.1 39.8    319 282       Recent Labs     10/27/24  0612 10/28/24  0210 10/29/24  0347    139 139   K 4.0 3.6 3.4*   * 110* 110*   CO2 21* 19* 18*   BUN 23 25* 16   CREATININE 0.8 0.8 0.7   CALCIUM 8.7 8.9 8.2*       Assessment:    Principal Problem:    Dehydration  Resolved Problems:    * No resolved hospital problems. *      Plan:    Patient is a 74-year-old male admitted to [facility] for  Dehydration due to diarrhea secondary to enterocolitis  -Monitor labs  -WBC 12.4 > 7.6  -Continue IVF NS at 75  -P.o. Omnicef and Flagyl if able to tolerate otherwise transition to IV  -Clear liquid diet, advance as tolerated  -Stool studies to rule out C. Difficile pending still  -Antiemetics as needed  If diarrhea has slowed down by tomorrow, he may be discharged back to facility     Hypothyroidism  -Continue home levothyroxine    10/29/24  -K}+ 3.4, replace with as needed protocol  -Persistent abdominal pain and diarrhea with eating  -Consult GI for CT findings of biliary duct dilation and unable to tolerate oral  intake  -C. difficile sample was canceled due to patient having a as needed GlycoLax order ?   -Other labs and vital stable  -Spoke with niece at bedside    Code status: DNR-CCA   Consultants: GI     DVT Prophylaxis lovenox   PT/OT  Discharge planning       I have spent a total time of 30 minutes of this patient encounter reviewing chart, labs, coordinating care with interdisciplinary teams, face to face encounter with patient, providing counseling/education to patient/family.      DOROTHY Gallardo - CNP  3:47 PM  10/29/2024

## 2024-10-29 NOTE — DISCHARGE INSTR - COC
Continuity of Care Form    Patient Name: Rikki García   :  1950  MRN:  [id number]    Admit date:  10/27/2024  Discharge date:  10/31/24    Code Status Order: DNR-CCA   Advance Directives:   Advance Care Flowsheet Documentation             Admitting Physician:  Maribel Werner MD  PCP: John Mayo DO    Discharging Nurse: AM  Discharging Hospital Unit/Room#: 0433/0433-A  Discharging Unit Phone Number: [phone]    Emergency Contact:   Extended Emergency Contact Information  Primary Emergency Contact: Shelbie Gardner  Mobile Phone: [phone]  Relation: Niece/Nephew   needed? No  Secondary Emergency Contact: Caitlyn English  Mobile Phone: [phone]  Relation: Niece/Nephew   needed? No    Past Surgical History:  Past Surgical History:   Procedure Laterality Date    CHOLECYSTECTOMY      UPPER GASTROINTESTINAL ENDOSCOPY N/A 2024    ESOPHAGOGASTRODUODENOSCOPY performed by Bry Johnson MD at [facility] ENDOSCOPY       Immunization History:     There is no immunization history on file for this patient.    Active Problems:  Patient Active Problem List   Diagnosis Code    Small bowel obstruction (HCC) K56.609    Nasal bleeding R04.0    Lower abdominal pain R10.30    SOB (shortness of breath) R06.02    Troponin I above reference range R79.89    Acquired hypothyroidism E03.9    Upper GI bleed K92.2    Atrial fibrillation (HCC) I48.91    Hyperlipidemia E78.5    Coffee ground emesis K92.0    Melena K92.1    Dehydration E86.0       Isolation/Infection:   Isolation            No Isolation          Patient Infection Status       Infection Onset Added Last Indicated Last Indicated By Review Planned Expiration Resolved Resolved By    None active    Resolved    COVID-19 02/13/23 02/15/23 02/13/23 COVID-19   23 Infection                        Nurse Assessment:  Last Vital Signs: /88   Pulse 76   Temp 98.4 °F (36.9 °C) (Oral)   Resp 16   Ht 1.524 m (5')   Wt 47.7 kg (105 lb

## 2024-10-29 NOTE — PROGRESS NOTES
Physical Therapy  Facility/Department: [address] INTERMEDIATE 1  Physical Therapy Treatment Note    Name: Rikki García  : 1950  MRN: [id number]  Date of Service: 10/29/2024           Patient Diagnosis(es): dehydration  Past Medical History:  has a past medical history of A-fib (HCC), Cognitive impairment, Cystoma, Heart failure (HCC), Hyperlipidemia, and Hypothyroid.  Past Surgical History:  has a past surgical history that includes Cholecystectomy and Upper gastrointestinal endoscopy (N/A, 2024).        Evaluating Therapist: Andreina Edouard PT    Room #:  0433/0433-A  Diagnosis:  Dehydration [E86.0]  PMHx/PSHx:  Afib, heart failure  Precautions:  falls, alarm, contact isolation      Social:  Pt admitted from facility. States he ambulates with rollator   Initial Evaluation  Date: 10/28/24 Treatment  10/29/2024    Short Term/ Long Term   Goals   Was pt agreeable to Eval/treatment? With encouragement yes    Does pt have pain? No c/o pain Buttocks soreness with hygiene care    Bed Mobility  Rolling: mod assist  Supine to sit: mod assist  Sit to supine: NT  Scooting: mod assist Rolling: Min A  Supine to sit; Min A  Scooting: Min A seated to EOB SBA   Transfers Sit to stand: mod assist  Stand to sit: mod assist  Stand pivot: mod assist Sit <> stand: Min A  Stand pivot; Mod A   Min assist   Ambulation    A few steps bed to chair with ww mod assist Few steps with WW Mod A 50 feet with ww with min assist   Stair Negotiation  Ascended and descended  NT NT  N/A   LE strength     Grossly 3/5    3+/5   balance      Fair-     AM-PAC Raw score                     Pt is alert, following instruction, states wants to get OOB to a chair today  Balance: poor during brief mobility with WW    Pt performed therapeutic exercise of the following: NT    Patient education/treatment  Pt was educated on UE usage transfer safety, LE participation with transfer participation, upright standing posture    Patient response to

## 2024-10-29 NOTE — PROGRESS NOTES
Occupational Therapy  Date:10/29/2024  Patient Name: Rikki García  Room: 0433/0433-A     Occupational Therapy (OT) order received, patient's medical record reviewed, and OT evaluation attempted this date; patient unavailable due to provider at bedside. OT evaluation to be re-attempted at later time/date, as able/appropriate.    Kathy Mena, OTR/L  License Number: [id number]

## 2024-10-29 NOTE — PROGRESS NOTES
Patient buttock/coccyx at risk for breakdown. May possibly be beginning to breakdown. Patient is refusing a mepilex dressing. Patient is allowing turns.

## 2024-10-29 NOTE — CONSULTS
[facility] Consult Note   DOROTHY Miller-LINNEA-C with Oliverio Brantley D.O. Consult Note        Date of Service: 10/30/2024  Reason for Consult: biliary duct dilation with persistenet abdominal pain and diarrhea with eating   Requesting Physician: Amparo DE LA CRUZ CNP    CHIEF COMPLAINT:  abdominal pain and diarrhea    History Obtained From:  patient, nursing and electronic medical record    HISTORY OF PRESENT ILLNESS:       Rikki García is a 74 y.o. male with significant past medical history of A Fib, CHF, HLD and hypothyroidism presenting to ED for abdominal pain and diarrhea admitted with dehydration.  HPI limited by patient. Pt denies abdominal pain or nausea. No BM today. States he did have abdominal pain but unable to give details. No family currenty at bedside. Discussed with nursing, no BM so far today, no current complaints had refused medications in the past. Discussed with nursing to observe patient today for further recommendations.  EGD 4/12/24 with Dr. Johnson-   Normal esophagus. Z-line friable at 35 cm from the incisors. 5 cm hiatal hernia. Normal stomach. Normal examined duodenum. No specimens collected    Admission labs: lipase 75, WBC 12.4. Imaging: CT abdomen pelvis W IV contrast- 1. Fluid-filled distended mid and distal small bowel loops. Fluid identified diffusely throughout the colon. Findings to suggest an enterocolitis. 2. Diverticulosis of the sigmoid colon. No definite evidence of diverticulitis. 3. Status post cholecystectomy. There is biliary ductal dilatation both intrahepatic and extrahepatic which is slightly increased when compared to the prior examination. Correlation to liver enzymes is recommended as clinically indicated.   Mild pancreatic ductal dilatation. Consider MRCP for further evaluation. 4. Moderate to large hiatal hernia. Labs today lipase 62, hgb 12.1    Consultation for biliary duct dilation with persistent abdominal pain and diarrhea  identified inferiorly on the right kidney. GI/Bowel: The stomach is otherwise unremarkable.  Moderate size hiatal hernia.  The proximal small bowel is within normal limits.  Fluid-filled distended mid and distal small bowel loops.  Fluid identified diffusely throughout the colon.  Findings to suggest an enterocolitis.  Diverticulosis identified of the sigmoid colon.  No definite evidence of diverticulitis. The appendix is unremarkable.  Surgical staple line identified within the rectum. Pelvis: The bladder is mildly distended.  No gross mass or lesion.  The prostate is unremarkable. Peritoneum/Retroperitoneum: No abdominal retroperitoneal lymphadenopathy. Vascular calcifications seen diffusely throughout the abdominal aorta and iliac vessels.  No pelvic adenopathy. Bones/Soft Tissues: Bony structures reveal minimal multilevel degenerative changes seen within the spine.  Diastasis of the rectus muscles with no ventral abdominal wall mass or defect.     1. Fluid-filled distended mid and distal small bowel loops. Fluid identified diffusely throughout the colon. Findings to suggest an enterocolitis. 2. Diverticulosis of the sigmoid colon. No definite evidence of diverticulitis. 3. Status post cholecystectomy. There is biliary ductal dilatation both intrahepatic and extrahepatic which is slightly increased when compared to the prior examination. Correlation to liver enzymes is recommended as clinically indicated.   Mild pancreatic ductal dilatation. Consider MRCP for further evaluation. 4. Moderate to large hiatal hernia.       IMPRESSION:  Diarrhea- appears to have somewhat resolved?  Enterocolitis  Diverticulosis  Hx cholecystectomy  CBD dilatation, most likely 2/2 to above  Elevated lipase on admission  Occult + stools     RECOMMENDATIONS:    Elastase and calprotectin stool ordered  MRI/MRCP ordered, waiting results  Diet as tolerated- d/w nursing to observe today  Monitor stools  Hgb stable, no overt signs of

## 2024-10-29 NOTE — PROGRESS NOTES
Comprehensive Nutrition Assessment    Type and Reason for Visit:  Initial, Positive Nutrition Screen    Nutrition Recommendations/Plan:   Continue current diet and ONS with all meals  Continue inpatient monitoring      Malnutrition Assessment:  Malnutrition Status:  Moderate malnutrition (10/29/24 1550)    Context:  Chronic Illness     Findings of the 6 clinical characteristics of malnutrition:  Energy Intake:  75% or less estimated energy requirements for 1 month or longer  Weight Loss:  Mild weight loss (specify amount and time period) (8% in last 6 mo)     Body Fat Loss:  Mild body fat loss Orbital   Muscle Mass Loss:  Mild muscle mass loss Clavicles (pectoralis & deltoids)  Fluid Accumulation:  Unable to assess     Strength:  Not Performed    Nutrition Assessment:    Pt admit from NH 2/2 enterocolitis. Pt had abd pain and a fair amount of diarrhea PTA. PMHx= A-fib, CHF, cognitive impairment. Pt meets criteria for PCM. Diet advanced from Clear liquid diet. Will provide ONS with all meals to optimize nutrient intake. Note current and PTA appetite stimulant orders.    Nutrition Related Findings:    A&Ox1, soft tender abd +BS, missing teeth, no edema, I/O WNL, hypokalemia, Wound Type: None (reddened heels, buttocks)       Current Nutrition Intake & Therapies:    Average Meal Intake: Unable to assess  Average Supplements Intake: None Ordered  ADULT DIET; Dysphagia - Soft and Bite Sized  ADULT ORAL NUTRITION SUPPLEMENT; Breakfast, Dinner; Standard High Calorie/High Protein Oral Supplement  ADULT ORAL NUTRITION SUPPLEMENT; Lunch; Frozen Oral Supplement    Anthropometric Measures:  Height: 152.4 cm (5')  Ideal Body Weight (IBW): 106 lbs (48 kg)    Admission Body Weight: 47.7 kg (105 lb 2.6 oz) (10/28 bedscale)  Current Body Weight: 47.7 kg (105 lb 2.6 oz), 99.2 % IBW. Weight Source: Bed Scale (10/28)  Current BMI (kg/m2): 20.5  Usual Body Weight: 51.9 kg (114 lb 8 oz) (4/12 bedscale)  % Weight Change (Calculated):

## 2024-10-29 NOTE — CONSULTS
Consult received  Charts/labs reviewed  Extensive review of medical record    Full consult to follow        Discussed with Dr. Oliverio Franco, APRN - CNP , APRN NP-C 5:03 PM for Dr. Brantley

## 2024-10-29 NOTE — PROGRESS NOTES
Patient is refusing nighttime medications. Education completed that they already refused daytime meds and should take evening medications. Patient said they would not take the medications and want to rest. Medications not given.

## 2024-10-30 LAB
ALBUMIN SERPL-MCNC: 3 G/DL (ref 3.5–5.2)
ALP SERPL-CCNC: 47 U/L (ref 40–129)
ALT SERPL-CCNC: 32 U/L (ref 0–40)
ANION GAP SERPL CALCULATED.3IONS-SCNC: 8 MMOL/L (ref 7–16)
AST SERPL-CCNC: 37 U/L (ref 0–39)
BASOPHILS # BLD: 0.05 K/UL (ref 0–0.2)
BASOPHILS NFR BLD: 1 % (ref 0–2)
BILIRUB DIRECT SERPL-MCNC: <0.2 MG/DL (ref 0–0.3)
BILIRUB INDIRECT SERPL-MCNC: ABNORMAL MG/DL (ref 0–1)
BILIRUB SERPL-MCNC: 0.2 MG/DL (ref 0–1.2)
BUN SERPL-MCNC: 8 MG/DL (ref 6–23)
CALCIUM SERPL-MCNC: 8.3 MG/DL (ref 8.6–10.2)
CHLORIDE SERPL-SCNC: 112 MMOL/L (ref 98–107)
CO2 SERPL-SCNC: 17 MMOL/L (ref 22–29)
CREAT SERPL-MCNC: 0.6 MG/DL (ref 0.7–1.2)
EOSINOPHIL # BLD: 0.27 K/UL (ref 0.05–0.5)
EOSINOPHILS RELATIVE PERCENT: 6 % (ref 0–6)
ERYTHROCYTE [DISTWIDTH] IN BLOOD BY AUTOMATED COUNT: 13.8 % (ref 11.5–15)
GFR, ESTIMATED: >90 ML/MIN/1.73M2
GLUCOSE SERPL-MCNC: 90 MG/DL (ref 74–99)
HCT VFR BLD AUTO: 36.1 % (ref 37–54)
HGB BLD-MCNC: 12.1 G/DL (ref 12.5–16.5)
IMM GRANULOCYTES # BLD AUTO: <0.03 K/UL (ref 0–0.58)
IMM GRANULOCYTES NFR BLD: 0 % (ref 0–5)
LIPASE SERPL-CCNC: 62 U/L (ref 13–60)
LYMPHOCYTES NFR BLD: 1.49 K/UL (ref 1.5–4)
LYMPHOCYTES RELATIVE PERCENT: 32 % (ref 20–42)
MAGNESIUM SERPL-MCNC: 2.1 MG/DL (ref 1.6–2.6)
MCH RBC QN AUTO: 31.5 PG (ref 26–35)
MCHC RBC AUTO-ENTMCNC: 33.5 G/DL (ref 32–34.5)
MCV RBC AUTO: 94 FL (ref 80–99.9)
MONOCYTES NFR BLD: 0.47 K/UL (ref 0.1–0.95)
MONOCYTES NFR BLD: 10 % (ref 2–12)
NEUTROPHILS NFR BLD: 51 % (ref 43–80)
NEUTS SEG NFR BLD: 2.43 K/UL (ref 1.8–7.3)
PLATELET # BLD AUTO: 288 K/UL (ref 130–450)
PMV BLD AUTO: 10.1 FL (ref 7–12)
POTASSIUM SERPL-SCNC: 3.5 MMOL/L (ref 3.5–5)
PROT SERPL-MCNC: 5.8 G/DL (ref 6.4–8.3)
RBC # BLD AUTO: 3.84 M/UL (ref 3.8–5.8)
SODIUM SERPL-SCNC: 137 MMOL/L (ref 132–146)
WBC OTHER # BLD: 4.7 K/UL (ref 4.5–11.5)

## 2024-10-30 PROCEDURE — 36415 COLL VENOUS BLD VENIPUNCTURE: CPT

## 2024-10-30 PROCEDURE — 97535 SELF CARE MNGMENT TRAINING: CPT

## 2024-10-30 PROCEDURE — 97165 OT EVAL LOW COMPLEX 30 MIN: CPT

## 2024-10-30 PROCEDURE — 85025 COMPLETE CBC W/AUTO DIFF WBC: CPT

## 2024-10-30 PROCEDURE — 82248 BILIRUBIN DIRECT: CPT

## 2024-10-30 PROCEDURE — 6370000000 HC RX 637 (ALT 250 FOR IP): Performed by: NURSE PRACTITIONER

## 2024-10-30 PROCEDURE — 80053 COMPREHEN METABOLIC PANEL: CPT

## 2024-10-30 PROCEDURE — 2060000000 HC ICU INTERMEDIATE R&B

## 2024-10-30 PROCEDURE — 6360000002 HC RX W HCPCS: Performed by: NURSE PRACTITIONER

## 2024-10-30 PROCEDURE — 83735 ASSAY OF MAGNESIUM: CPT

## 2024-10-30 PROCEDURE — 83690 ASSAY OF LIPASE: CPT

## 2024-10-30 PROCEDURE — 2580000003 HC RX 258: Performed by: NURSE PRACTITIONER

## 2024-10-30 RX ADMIN — MEGESTROL ACETATE 20 MG: 20 TABLET ORAL at 09:25

## 2024-10-30 RX ADMIN — ONDANSETRON 4 MG: 2 INJECTION INTRAMUSCULAR; INTRAVENOUS at 09:24

## 2024-10-30 RX ADMIN — CEFDINIR 300 MG: 300 CAPSULE ORAL at 09:25

## 2024-10-30 RX ADMIN — SODIUM BICARBONATE 650 MG: 650 TABLET ORAL at 09:24

## 2024-10-30 RX ADMIN — FERROUS SULFATE TAB 325 MG (65 MG ELEMENTAL FE) 325 MG: 325 (65 FE) TAB at 22:20

## 2024-10-30 RX ADMIN — Medication 3 MG: at 20:15

## 2024-10-30 RX ADMIN — CETIRIZINE HYDROCHLORIDE 5 MG: 10 TABLET, FILM COATED ORAL at 09:24

## 2024-10-30 RX ADMIN — SODIUM BICARBONATE 650 MG: 650 TABLET ORAL at 20:15

## 2024-10-30 RX ADMIN — ENOXAPARIN SODIUM 30 MG: 100 INJECTION SUBCUTANEOUS at 09:30

## 2024-10-30 RX ADMIN — MEGESTROL ACETATE 20 MG: 20 TABLET ORAL at 14:35

## 2024-10-30 RX ADMIN — METRONIDAZOLE 500 MG: 500 TABLET ORAL at 22:20

## 2024-10-30 RX ADMIN — MIRTAZAPINE 15 MG: 15 TABLET, FILM COATED ORAL at 20:15

## 2024-10-30 RX ADMIN — SODIUM CHLORIDE: 9 INJECTION, SOLUTION INTRAVENOUS at 09:27

## 2024-10-30 RX ADMIN — ROSUVASTATIN CALCIUM 10 MG: 10 TABLET, FILM COATED ORAL at 20:15

## 2024-10-30 RX ADMIN — Medication 1 CAPSULE: at 09:25

## 2024-10-30 RX ADMIN — PRIMIDONE 125 MG: 250 TABLET ORAL at 20:15

## 2024-10-30 RX ADMIN — METRONIDAZOLE 500 MG: 500 TABLET ORAL at 14:35

## 2024-10-30 RX ADMIN — CEFDINIR 300 MG: 300 CAPSULE ORAL at 20:15

## 2024-10-30 RX ADMIN — PANTOPRAZOLE SODIUM 40 MG: 40 TABLET, DELAYED RELEASE ORAL at 09:25

## 2024-10-30 RX ADMIN — SODIUM CHLORIDE, PRESERVATIVE FREE 10 ML: 5 INJECTION INTRAVENOUS at 20:15

## 2024-10-30 RX ADMIN — MEGESTROL ACETATE 20 MG: 20 TABLET ORAL at 20:15

## 2024-10-30 RX ADMIN — PRIMIDONE 125 MG: 250 TABLET ORAL at 09:25

## 2024-10-30 ASSESSMENT — PAIN SCALES - WONG BAKER
WONGBAKER_NUMERICALRESPONSE: NO HURT
WONGBAKER_NUMERICALRESPONSE: HURTS EVEN MORE
WONGBAKER_NUMERICALRESPONSE: HURTS A LITTLE BIT
WONGBAKER_NUMERICALRESPONSE: NO HURT

## 2024-10-30 ASSESSMENT — PAIN SCALES - GENERAL
PAINLEVEL_OUTOF10: 0
PAINLEVEL_OUTOF10: 0

## 2024-10-30 NOTE — CARE COORDINATION
CASE MANAGEMENT.... Chart reviewed. Per nursing, patient with poor po intake. GI following. Had MRCP yesterday. Patient continues on ivf, po flagyl and omnicef. Await PCP input today. PT 13/OT pending. Patient will return to [facility] at [place]. Bed hold. No precert required. N 17 in epic. Ambulance forms in chart. Will follow.

## 2024-10-30 NOTE — PROGRESS NOTES
[facility]                                Progress note    Subjective:    The patient is awake and alert.    Resting comfortably in bed  Family at bedside    Objective:    /72   Pulse 71   Temp 98.1 °F (36.7 °C) (Oral)   Resp 18   Ht 1.524 m (5')   Wt 50.6 kg (111 lb 8 oz)   SpO2 96%   BMI 21.78 kg/m²     In: 3154.9 [I.V.:3154.9]  Out: 2200   In: 3154.9   Out: 2200 [Urine:2200]    General appearance: NAD, conversant, frail   HEENT: AT/NC, MMM  Neck: FROM, supple  Lungs: Clear to auscultation  CV: RRR, no MRGs  Vasc: Radial pulses 2+  Abdomen: Soft, non-tender; no masses or HSM  Extremities: No peripheral edema or digital cyanosis  Skin: no rash, lesions or ulcers  Psych: Alert and oriented to person, place and time, intermit confusion  Neuro: Alert and interactive     Recent Labs     10/28/24  0210 10/29/24  0347 10/30/24  0430   WBC 7.6 6.3 4.7   HGB 13.6 13.5 12.1*   HCT 41.1 39.8 36.1*    282 288       Recent Labs     10/28/24  0210 10/29/24  0347 10/30/24  0137    139 137   K 3.6 3.4* 3.5   * 110* 112*   CO2 19* 18* 17*   BUN 25* 16 8   CREATININE 0.8 0.7 0.6*   CALCIUM 8.9 8.2* 8.3*       Assessment:    Principal Problem:    Dehydration  Active Problems:    Moderate protein-calorie malnutrition (HCC)  Resolved Problems:    * No resolved hospital problems. *      Plan:    Patient is a 74-year-old male admitted to [facility] for  Dehydration due to diarrhea secondary to enterocolitis  -Monitor labs  -WBC 12.4 > 7.6  -Continue IVF NS at 75  -P.o. Omnicef and Flagyl if able to tolerate otherwise transition to IV  -Clear liquid diet, advance as tolerated  -Stool studies to rule out C. Difficile pending still  -Antiemetics as needed  If diarrhea has slowed down by tomorrow, he may be discharged back to facility     Hypothyroidism  -Continue home levothyroxine    10/29/24  -K}+ 3.4, replace with as needed protocol  -Persistent abdominal pain and diarrhea with

## 2024-10-30 NOTE — PROGRESS NOTES
Occupational Therapy  OCCUPATIONAL THERAPY INITIAL EVALUATION  [facility]  [address], [place]    Date: 10/30/2024     Patient Name: Rikki (\"Melinda\"Bernadette García  MRN: [id number]  : 1950  Room: [address] [place], [place] [address]    Evaluating OT: Kathy Mena, OTR/L - [id number]    Referring Provider: Carolina Meyers APRN - CNP   Specific Provider Orders/Date: \"OT eval and treat\" - 10/27/2024    Diagnosis: Dehydration [E86.0]     Patient underwent MRCP on 10/29/2024.    Pertinent Medical History: dementia (per family member report), cognitive impairment, a-fib, hyperlipidemia     Precautions: fall risk, bed/chair alarms, skin integrity  Additional Precautions: urinary incontinence    Assessment of Current Deficits:    [x] Functional mobility   [x] ADLs  [x] Strength               [x] Cognition   [x] Functional transfers   [x] IADLs         [x] Safety Awareness   [x] Endurance   [] Fine Motor Coordination  [x] Balance      [] Vision/Perception   [x] Sensation    [] Gross Motor Coordination [] ROM          [] Delirium                  [] Motor Control     OT PLAN OF CARE   OT POC is based on physician orders, patient diagnosis, and results of clinical assessment.  Frequency/Duration 2-5 days/week for 2-4 weeks PRN   Specific OT Treatment Interventions to Include:   * Instruction/training on adapted ADL techniques and AE recommendations to increase functional independence within precautions       * Training on energy conservation strategies, correct breathing pattern and techniques to improve independence/tolerance for self-care routine  * Functional transfer/mobility training/DME recommendations for increased independence, safety, and fall prevention  * Patient/Family education to increase follow through with safety techniques and functional independence  * Recommendation of environmental modifications for increased safety with functional transfers/mobility and

## 2024-10-31 VITALS
RESPIRATION RATE: 16 BRPM | BODY MASS INDEX: 22.16 KG/M2 | DIASTOLIC BLOOD PRESSURE: 60 MMHG | OXYGEN SATURATION: 97 % | SYSTOLIC BLOOD PRESSURE: 98 MMHG | HEIGHT: 60 IN | WEIGHT: 112.9 LBS | HEART RATE: 63 BPM | TEMPERATURE: 98.1 F

## 2024-10-31 LAB
ANION GAP SERPL CALCULATED.3IONS-SCNC: 12 MMOL/L (ref 7–16)
ATYPICAL LYMPHOCYTE ABSOLUTE COUNT: 0.15 K/UL (ref 0–0.46)
ATYPICAL LYMPHOCYTES: 3 % (ref 0–4)
BASOPHILS # BLD: 0.05 K/UL (ref 0–0.2)
BASOPHILS NFR BLD: 1 % (ref 0–2)
BUN SERPL-MCNC: 8 MG/DL (ref 6–23)
CALCIUM SERPL-MCNC: 8.7 MG/DL (ref 8.6–10.2)
CHLORIDE SERPL-SCNC: 113 MMOL/L (ref 98–107)
CO2 SERPL-SCNC: 13 MMOL/L (ref 22–29)
CREAT SERPL-MCNC: 0.7 MG/DL (ref 0.7–1.2)
EOSINOPHIL # BLD: 0.2 K/UL (ref 0.05–0.5)
EOSINOPHILS RELATIVE PERCENT: 4 % (ref 0–6)
ERYTHROCYTE [DISTWIDTH] IN BLOOD BY AUTOMATED COUNT: 14 % (ref 11.5–15)
FAT QUALITATIVE SPLIT STOOL: NORMAL
FECAL NEUTRAL FAT: NORMAL
GFR, ESTIMATED: >90 ML/MIN/1.73M2
GLUCOSE SERPL-MCNC: 106 MG/DL (ref 74–99)
HCT VFR BLD AUTO: 42.2 % (ref 37–54)
HGB BLD-MCNC: 13.5 G/DL (ref 12.5–16.5)
LYMPHOCYTES NFR BLD: 2.19 K/UL (ref 1.5–4)
LYMPHOCYTES RELATIVE PERCENT: 43 % (ref 20–42)
MCH RBC QN AUTO: 31.8 PG (ref 26–35)
MCHC RBC AUTO-ENTMCNC: 32 G/DL (ref 32–34.5)
MCV RBC AUTO: 99.5 FL (ref 80–99.9)
MONOCYTES NFR BLD: 0.31 K/UL (ref 0.1–0.95)
MONOCYTES NFR BLD: 6 % (ref 2–12)
NEUTROPHILS NFR BLD: 43 % (ref 43–80)
NEUTS SEG NFR BLD: 2.19 K/UL (ref 1.8–7.3)
PLATELET # BLD AUTO: 255 K/UL (ref 130–450)
PMV BLD AUTO: 9.8 FL (ref 7–12)
POTASSIUM SERPL-SCNC: 3.8 MMOL/L (ref 3.5–5)
RBC # BLD AUTO: 4.24 M/UL (ref 3.8–5.8)
RBC # BLD: ABNORMAL 10*6/UL
SODIUM SERPL-SCNC: 138 MMOL/L (ref 132–146)
WBC OTHER # BLD: 5.1 K/UL (ref 4.5–11.5)

## 2024-10-31 PROCEDURE — 6370000000 HC RX 637 (ALT 250 FOR IP): Performed by: NURSE PRACTITIONER

## 2024-10-31 PROCEDURE — 80048 BASIC METABOLIC PNL TOTAL CA: CPT

## 2024-10-31 PROCEDURE — 85025 COMPLETE CBC W/AUTO DIFF WBC: CPT

## 2024-10-31 PROCEDURE — 36415 COLL VENOUS BLD VENIPUNCTURE: CPT

## 2024-10-31 PROCEDURE — 2580000003 HC RX 258: Performed by: NURSE PRACTITIONER

## 2024-10-31 PROCEDURE — 6360000002 HC RX W HCPCS: Performed by: NURSE PRACTITIONER

## 2024-10-31 RX ORDER — ENOXAPARIN SODIUM 100 MG/ML
40 INJECTION SUBCUTANEOUS DAILY
Status: DISCONTINUED | OUTPATIENT
Start: 2024-10-31 | End: 2024-10-31 | Stop reason: HOSPADM

## 2024-10-31 RX ORDER — CEFDINIR 300 MG/1
300 CAPSULE ORAL EVERY 12 HOURS SCHEDULED
Qty: 20 CAPSULE | Refills: 0 | Status: SHIPPED | OUTPATIENT
Start: 2024-10-31 | End: 2024-11-10

## 2024-10-31 RX ORDER — METRONIDAZOLE 500 MG/1
500 TABLET ORAL EVERY 8 HOURS SCHEDULED
Qty: 30 TABLET | Refills: 0 | Status: SHIPPED | OUTPATIENT
Start: 2024-10-31 | End: 2024-11-10

## 2024-10-31 RX ORDER — 0.9 % SODIUM CHLORIDE 0.9 %
250 INTRAVENOUS SOLUTION INTRAVENOUS ONCE
Status: COMPLETED | OUTPATIENT
Start: 2024-10-31 | End: 2024-10-31

## 2024-10-31 RX ADMIN — LEVOTHYROXINE SODIUM 100 MCG: 100 TABLET ORAL at 05:55

## 2024-10-31 RX ADMIN — MEGESTROL ACETATE 20 MG: 20 TABLET ORAL at 08:28

## 2024-10-31 RX ADMIN — ENOXAPARIN SODIUM 40 MG: 100 INJECTION SUBCUTANEOUS at 08:30

## 2024-10-31 RX ADMIN — CEFDINIR 300 MG: 300 CAPSULE ORAL at 08:28

## 2024-10-31 RX ADMIN — METRONIDAZOLE 500 MG: 500 TABLET ORAL at 05:55

## 2024-10-31 RX ADMIN — SODIUM CHLORIDE 250 ML: 9 INJECTION, SOLUTION INTRAVENOUS at 02:30

## 2024-10-31 RX ADMIN — Medication 1 CAPSULE: at 08:28

## 2024-10-31 RX ADMIN — SODIUM BICARBONATE 650 MG: 650 TABLET ORAL at 08:28

## 2024-10-31 RX ADMIN — PANTOPRAZOLE SODIUM 40 MG: 40 TABLET, DELAYED RELEASE ORAL at 08:28

## 2024-10-31 RX ADMIN — SODIUM CHLORIDE, PRESERVATIVE FREE 10 ML: 5 INJECTION INTRAVENOUS at 08:33

## 2024-10-31 RX ADMIN — CETIRIZINE HYDROCHLORIDE 5 MG: 10 TABLET, FILM COATED ORAL at 08:28

## 2024-10-31 RX ADMIN — PRIMIDONE 125 MG: 250 TABLET ORAL at 08:28

## 2024-10-31 ASSESSMENT — PAIN SCALES - WONG BAKER
WONGBAKER_NUMERICALRESPONSE: NO HURT

## 2024-10-31 NOTE — PROGRESS NOTES
[facility]                                Progress note    Subjective:    The patient is awake and alert.    Much more alert and oriented  No acute complaints today  Objective:    BP 98/60   Pulse 63   Temp 98.1 °F (36.7 °C) (Axillary)   Resp 16   Ht 1.524 m (5')   Wt 51.2 kg (112 lb 14.4 oz)   SpO2 97%   BMI 22.05 kg/m²     In: 954.3 [I.V.:697.4]  Out: 1400   In: 954.3   Out: 1400 [Urine:1400]    General appearance: NAD, conversant, frail   HEENT: AT/NC, MMM  Neck: FROM, supple  Lungs: Clear to auscultation  CV: RRR, no MRGs  Vasc: Radial pulses 2+  Abdomen: Soft, non-tender; no masses or HSM  Extremities: No peripheral edema or digital cyanosis  Skin: no rash, lesions or ulcers  Psych: Alert and oriented to person, place and time, intermit confusion  Neuro: Alert and interactive     Recent Labs     10/29/24  0347 10/30/24  0430   WBC 6.3 4.7   HGB 13.5 12.1*   HCT 39.8 36.1*    288       Recent Labs     10/29/24  0347 10/30/24  0137    137   K 3.4* 3.5   * 112*   CO2 18* 17*   BUN 16 8   CREATININE 0.7 0.6*   CALCIUM 8.2* 8.3*       Assessment:    Principal Problem:    Dehydration  Active Problems:    Moderate protein-calorie malnutrition (HCC)  Resolved Problems:    * No resolved hospital problems. *      Plan:    Patient is a 74-year-old male admitted to [facility] for  Dehydration due to diarrhea secondary to enterocolitis  -Monitor labs  -WBC 12.4 > 7.6  -Continue IVF NS at 75  -P.o. Omnicef and Flagyl if able to tolerate otherwise transition to IV  -Clear liquid diet, advance as tolerated  -Stool studies to rule out C. Difficile pending still  -Antiemetics as needed  If diarrhea has slowed down by tomorrow, he may be discharged back to facility     Hypothyroidism  -Continue home levothyroxine    10/29/24  -K}+ 3.4, replace with as needed protocol  -Persistent abdominal pain and diarrhea with eating  -Consult GI for CT findings of biliary duct dilation and unable

## 2024-10-31 NOTE — PROGRESS NOTES
DVT Prophylaxis Adjustment Policy (DVT Prophylaxis)     This patient is on DVT Prophylaxis medication that requires a dose adjustment      Date Body Weight IBW  Adjusted BW SCr  CrCl Dialysis status   10/31/2024 51.2 kg (112 lb 14.4 oz) Ideal body weight: 50 kg (110 lb 3.7 oz)  Adjusted ideal body weight: 50.5 kg (111 lb 4.8 oz) Serum creatinine: 0.6 mg/dL (L) 10/30/24 0137  Estimated creatinine clearance: 79 mL/min (A) N/a       Pharmacy has dose-adjusted the DVT Prophylaxis regimen to match   the recommendations from the following table        Ordered Medication:Lovenox 30mg daily    Order Changed/converted to: Lovenox 40mg daily      These changes were made per protocol according to the [facility] Pharmacist   Review for Appropriate Use and Automatic Dose Adjustments of   Subcutaneous Anticoagulants Policy     *Please note this dose may need readjusted if patient's condition changes.    Please contact pharmacy with any questions regarding these changes.    adair dietz RPH  10/31/2024  6:33 AM

## 2024-10-31 NOTE — PROGRESS NOTES
[facility] to [facility] [place]; set up for 1200.

## 2024-10-31 NOTE — CARE COORDINATION
CASE MANAGEMENT..... Stable for dc today back to [facility]. Nursing arranged for [facility] to transport patient at noon. Facility notified. N 17 in epic. Ambulance forms in chart.

## 2024-10-31 NOTE — PROGRESS NOTES
PROGRESS NOTE        Patient Presents with/Seen in Consultation For      Reason for Consult: biliary duct dilation with persistenet abdominal pain and diarrhea with eating   CHIEF COMPLAINT:  abdominal pain and diarrhea   Subjective:     Patient seen laying in bed in no apparent distress.  Denies abdominal pain.  Reports his right leg hurts.  No injury noted.  No nausea.  Per nursing had 1 loose stool yesterday no melena or hematochezia reported.  Ate okay yesterday.    Review of Systems  Aside from what was mentioned in the PMH and HPI, essentially unremarkable, all others negative.    Objective:     BP 98/60   Pulse 63   Temp 98.1 °F (36.7 °C) (Axillary)   Resp 16   Ht 1.524 m (5')   Wt 51.2 kg (112 lb 14.4 oz)   SpO2 97%   BMI 22.05 kg/m²     General appearance: alert, awake, laying in bed, and cooperative  Eyes: conjunctiva pale, sclera anicteric. PERRL.  Lungs: clear to auscultation bilaterally  Heart: regular rate and rhythm, no murmur, 2+ pulses; no edema  Abdomen: soft, non-tender; bowel sounds normal; no masses,  no organomegaly  Extremities: extremities 1+ edema, contractures  Pulses: 2+ and symmetric  Skin: Skin color, texture, turgor normal.   Neurologic: Alert to self    enoxaparin (LOVENOX) injection 40 mg, Daily  white petrolatum ointment, BID PRN  cefdinir (OMNICEF) capsule 300 mg, 2 times per day  metroNIDAZOLE (FLAGYL) tablet 500 mg, 3 times per day  [Held by provider] psyllium husk-aspartame (METAMUCIL FIBER) packet 1 packet, Daily  ferrous sulfate (IRON 325) tablet 325 mg, Once per day on Monday Wednesday Friday  lactobacillus (CULTURELLE) capsule 1 capsule, Daily  levothyroxine (SYNTHROID) tablet 100 mcg, Daily  cetirizine (ZYRTEC) tablet 5 mg, Daily  megestrol (MEGACE) tablet 20 mg, TID  melatonin tablet 3 mg, QHS  mirtazapine (REMERON) tablet 15 mg, Nightly  pantoprazole (PROTONIX) tablet 40 mg, Daily  primidone (MYSOLINE) tablet 125 mg, BID  rosuvastatin (CRESTOR) tablet 10 mg,

## 2024-11-02 NOTE — PROGRESS NOTES
Physician Progress Note      PATIENT:               ROYCE RUTH  CSN #:                  [id number]  :                       1950  ADMIT DATE:       10/27/2024 8:52 PM  DISCH DATE:        10/31/2024 12:40 PM  RESPONDING  PROVIDER #:        Maribel Werner MD          QUERY TEXT:    Patient admitted with dehydration and enterocolitis. Noted to have   documentation of moderate malnutrition in 10/29 dietician note. If possible,   please document in progress notes and discharge summary if you are evaluating   and /or treating any of the following:    The medical record reflects the following:  Risk Factors: diarrhea, cognitive impairment  Clinical Indicators: 10/29 dietician \"Moderate malnutrition, In context of   chronic illness related to cognitive or neurological impairment as evidenced   by Criteria as identified in malnutrition assessment.\"  Treatment: ONS    ASPEN Criteria:    https://aspenjournals.onlinelibrary.marte.com/doi/full/10.1177/518316375893296  5  Options provided:  -- Protein calorie malnutrition moderate  -- Other - I will add my own diagnosis  -- Disagree - Not applicable / Not valid  -- Disagree - Clinically unable to determine / Unknown  -- Refer to Clinical Documentation Reviewer    PROVIDER RESPONSE TEXT:    This patient has moderate protein calorie malnutrition.    Query created by: Rhona Rivers on 10/31/2024 10:39 AM      Electronically signed by:  Maribel Werner MD 2024 6:09 AM

## 2024-11-26 PROBLEM — E86.0 DEHYDRATION: Status: RESOLVED | Noted: 2024-10-27 | Resolved: 2024-11-26

## 2025-02-11 ENCOUNTER — PROCEDURE VISIT (OUTPATIENT)
Dept: AUDIOLOGY | Age: 75
End: 2025-02-11
Payer: COMMERCIAL

## 2025-02-11 ENCOUNTER — OFFICE VISIT (OUTPATIENT)
Dept: ENT CLINIC | Age: 75
End: 2025-02-11
Payer: COMMERCIAL

## 2025-02-11 VITALS
WEIGHT: 100 LBS | BODY MASS INDEX: 19.63 KG/M2 | TEMPERATURE: 97.5 F | HEIGHT: 60 IN | SYSTOLIC BLOOD PRESSURE: 122 MMHG | OXYGEN SATURATION: 96 % | DIASTOLIC BLOOD PRESSURE: 82 MMHG | HEART RATE: 84 BPM

## 2025-02-11 DIAGNOSIS — J34.89 NASAL DRYNESS: ICD-10-CM

## 2025-02-11 DIAGNOSIS — M26.622 TENDERNESS OF LEFT TEMPOROMANDIBULAR JOINT: ICD-10-CM

## 2025-02-11 DIAGNOSIS — H69.93 DYSFUNCTION OF BOTH EUSTACHIAN TUBES: ICD-10-CM

## 2025-02-11 DIAGNOSIS — H92.03 OTALGIA OF BOTH EARS: Primary | ICD-10-CM

## 2025-02-11 DIAGNOSIS — H61.21 IMPACTED CERUMEN OF RIGHT EAR: Primary | ICD-10-CM

## 2025-02-11 PROCEDURE — 1159F MED LIST DOCD IN RCRD: CPT | Performed by: NURSE PRACTITIONER

## 2025-02-11 PROCEDURE — 1160F RVW MEDS BY RX/DR IN RCRD: CPT | Performed by: NURSE PRACTITIONER

## 2025-02-11 PROCEDURE — 1123F ACP DISCUSS/DSCN MKR DOCD: CPT | Performed by: NURSE PRACTITIONER

## 2025-02-11 PROCEDURE — G8420 CALC BMI NORM PARAMETERS: HCPCS | Performed by: NURSE PRACTITIONER

## 2025-02-11 PROCEDURE — 1036F TOBACCO NON-USER: CPT | Performed by: NURSE PRACTITIONER

## 2025-02-11 PROCEDURE — 69210 REMOVE IMPACTED EAR WAX UNI: CPT | Performed by: NURSE PRACTITIONER

## 2025-02-11 PROCEDURE — 92567 TYMPANOMETRY: CPT | Performed by: AUDIOLOGIST

## 2025-02-11 PROCEDURE — 3017F COLORECTAL CA SCREEN DOC REV: CPT | Performed by: NURSE PRACTITIONER

## 2025-02-11 PROCEDURE — 99213 OFFICE O/P EST LOW 20 MIN: CPT | Performed by: NURSE PRACTITIONER

## 2025-02-11 PROCEDURE — G8427 DOCREV CUR MEDS BY ELIG CLIN: HCPCS | Performed by: NURSE PRACTITIONER

## 2025-02-11 RX ORDER — ECHINACEA PURPUREA EXTRACT 125 MG
2 TABLET ORAL 4 TIMES DAILY
Qty: 3 EACH | Refills: 3 | Status: SHIPPED | OUTPATIENT
Start: 2025-02-11

## 2025-02-11 RX ORDER — IBUPROFEN 400 MG/1
400 TABLET, FILM COATED ORAL 2 TIMES DAILY PRN
Qty: 60 TABLET | Refills: 1 | Status: SHIPPED | OUTPATIENT
Start: 2025-02-11

## 2025-02-11 ASSESSMENT — ENCOUNTER SYMPTOMS
RHINORRHEA: 0
STRIDOR: 0
SHORTNESS OF BREATH: 0
EYES NEGATIVE: 1
SINUS PAIN: 0
SINUS PRESSURE: 0
RESPIRATORY NEGATIVE: 1

## 2025-02-11 NOTE — PROGRESS NOTES
DEPARTMENT OF OTOLARYNGOLOGY  TANJA EspañaO., Ms. Ed.  TANJA AdhikariO.  Jean Pierre Li, MSN, FNP-C        Patient Name:  Rikki García  :  1950     CHIEF C/O:    Chief Complaint   Patient presents with    Follow-up     F/u, pain in right ear, patient saw Dr. Willett and was diagnosed with shingles in R ear, did not make it to f/u due to being in hospital, caregiver states that he is still having pain in R ear        HISTORY OBTAINED FROM:  patient, family member - niece    HISTORY OF PRESENT ILLNESS:       Rikki is a 74 y.o. year old male, here today for follow up of:       Ear pain secondary to shingles, cerumen impactions.  Patient was last seen approximate 4 months ago by Dr. Willett and diagnosed with shingles of the right ear.  Since that time has had continued intermittent ear pain of both the right and left ears.  He does have a history of cerumen impactions.  They deny any history of recurrent ear infections or previous ear surgeries.  Denies any noticeable changes to his hearing.  Denies any noticeable teeth clenching or grinding and patient is edentulous.  They deny congestion, rhinorrhea, postnasal drainage, sinus pain or pressure.  Denies any drainage from either ear.           Past Medical History:   Diagnosis Date    A-fib (HCC)     Cognitive impairment     Cystoma     Heart failure (HCC)     Hyperlipidemia     Hypothyroid      Past Surgical History:   Procedure Laterality Date    CHOLECYSTECTOMY      UPPER GASTROINTESTINAL ENDOSCOPY N/A 2024    ESOPHAGOGASTRODUODENOSCOPY performed by Bry Johnson MD at [facility] ENDOSCOPY       Current Outpatient Medications:     ibuprofen (ADVIL;MOTRIN) 400 MG tablet, Take 1 tablet by mouth 2 times daily as needed for Pain, Disp: 60 tablet, Rfl: 1    sodium chloride (ALTAMIST SPRAY) 0.65 % nasal spray, 2 sprays by Nasal route 4 times daily, Disp: 3 each, Rfl: 3    guaiFENesin-codeine (TUSSI-ORGANIDIN NR) 100-10 MG/5ML syrup, Take

## 2025-02-11 NOTE — PROGRESS NOTES
This patient was referred for tympanometric testing by RADHA Pagan due to ear pain, bilateral today. The patient has a history of shingles in the right ear.  He denied any hearing loss.    Tympanometry revealed shallow tympanograms, bilaterally.    The results were reviewed with the patient and ordering provider.     Recommendations for follow up will be made pending ordering provider consult.    Electronically signed by Marcelo Desai on 2/11/2025 at 8:56 AM

## 2025-03-21 NOTE — PROGRESS NOTES
Occupational Therapy  Date:10/28/2024  Patient Name: Rikki García  Room: [facility]A     Occupational Therapy (OT) order received, patient's medical record reviewed, and OT evaluation attempted this date; patient declined to participate in OOB activities, despite provision of encouragement. OT evaluation to be re-attempted at later time/date, as able/appropriate.    Kathy Mena, OTR/L  License Number: [id number]         21-Mar-2025

## (undated) DEVICE — DEFENDO AIR WATER SUCTION AND BIOPSY VALVE KIT FOR  OLYMPUS: Brand: DEFENDO AIR/WATER/SUCTION AND BIOPSY VALVE

## (undated) DEVICE — BITEBLOCK 54FR W/ DENT RIM BLOX

## (undated) DEVICE — GAUZE,SPONGE,4"X4",8PLY,STRL,LF,10/TRAY: Brand: MEDLINE

## (undated) DEVICE — SYRINGE MED 50ML LUERSLIP TIP